# Patient Record
Sex: MALE | Race: WHITE | NOT HISPANIC OR LATINO | Employment: OTHER | URBAN - NONMETROPOLITAN AREA
[De-identification: names, ages, dates, MRNs, and addresses within clinical notes are randomized per-mention and may not be internally consistent; named-entity substitution may affect disease eponyms.]

---

## 2017-01-23 ENCOUNTER — OFFICE VISIT (OUTPATIENT)
Dept: CARDIOLOGY | Facility: PHYSICIAN GROUP | Age: 82
End: 2017-01-23
Payer: MEDICARE

## 2017-01-23 VITALS
DIASTOLIC BLOOD PRESSURE: 70 MMHG | HEART RATE: 90 BPM | HEIGHT: 68 IN | BODY MASS INDEX: 28.19 KG/M2 | SYSTOLIC BLOOD PRESSURE: 114 MMHG | WEIGHT: 186 LBS

## 2017-01-23 DIAGNOSIS — I25.5 ISCHEMIC CARDIOMYOPATHY: ICD-10-CM

## 2017-01-23 DIAGNOSIS — I25.2 H/O NON-ST ELEVATION MYOCARDIAL INFARCTION (NSTEMI): ICD-10-CM

## 2017-01-23 DIAGNOSIS — I10 ESSENTIAL HYPERTENSION, BENIGN: ICD-10-CM

## 2017-01-23 DIAGNOSIS — I25.10 CORONARY ARTERY DISEASE INVOLVING NATIVE CORONARY ARTERY OF NATIVE HEART WITHOUT ANGINA PECTORIS: ICD-10-CM

## 2017-01-23 DIAGNOSIS — I51.3 APICAL MURAL THROMBUS: ICD-10-CM

## 2017-01-23 PROCEDURE — 99213 OFFICE O/P EST LOW 20 MIN: CPT | Performed by: INTERNAL MEDICINE

## 2017-01-23 ASSESSMENT — ENCOUNTER SYMPTOMS
BRUISES/BLEEDS EASILY: 0
FALLS: 0
COUGH: 0
SEIZURES: 0
ORTHOPNEA: 0
PND: 0
MYALGIAS: 0

## 2017-01-23 NOTE — PROGRESS NOTES
Subjective:   Juliocesar Salas is a 82 y.o. male who presents today for followup of coronary disease.  He has been clinically stable from a cardiac perspective but his echo was delayed until next week. We're going to get it as soon as possible. He has not had syncope nor near syncope nor chest pain. He has not had dyspnea. His principal symptom disease of fatigue. The other issue he has has occasional positional dizziness particularly turns his head to the left and his wife notices that at times he doesn't seem to be attending to his surroundings although he has had no loss of consciousness and he is unaware of this.    Past Medical History   Diagnosis Date   • Hypertension    • Arthritis    • Diabetes 1988     oral agents   • Asthma    • Coronary artery disease involving native coronary artery without angina pectoris 11/25/2014     Severe stenosis of LAD and ramus intermedius with moderate proximal RCA and CFX disease and severe distal disease, PCI not successful and CABG declined by Dr. Mustafa and family.   • Non-STEMI (non-ST elevated myocardial infarction) (CMS-HCC)    • Apical mural thrombus following MI (CMS-HCC)    • Rectus sheath hematoma 12/3/2014     Past Surgical History   Procedure Laterality Date   • Knee arthroplasty total  2002     bilateral   • Hip arthroplasty total  2007     right   • Colonoscopy  2/2012   • Inguinal hernia repair  1986     right x2   • Hip arthroplasty total  3/5/2012     Performed by OSMANY [facility] at SURGERY [facility]   • Cataract extraction with iol       Family History   Problem Relation Age of Onset   • Diabetes     • Heart Disease     • Hypertension     • Heart Attack Father 65     MI     History   Smoking status   • Former Smoker -- 1 years   • Types: Cigars   • Start date: 01/01/1993   • Quit date: 01/01/1994   Smokeless tobacco   • Former User   • Types: Chew   • Quit date: 01/01/1994     Allergies   Allergen Reactions   • Ambien [Zolpidem]      delerium   • Nkda [No  "Known Drug Allergy]      Outpatient Encounter Prescriptions as of 1/23/2017   Medication Sig Dispense Refill   • zolpidem (AMBIEN) 10 MG Tab Take 10 mg by mouth at bedtime as needed for Sleep.     • carvedilol (COREG) 3.125 MG Tab Take 1 Tab by mouth 2 times a day, with meals. 180 Tab 3   • montelukast (SINGULAIR) 10 MG Tab Take 10 mg by mouth every evening.     • tamsulosin (FLOMAX) 0.4 MG capsule Take 0.4 mg by mouth ONE-HALF HOUR AFTER BREAKFAST.     • furosemide (LASIX) 40 MG Tab Take 40 mg by mouth. 1 TAB IN AM; 1/2 TAB IN AFTERNOON     • lisinopril (PRINIVIL) 5 MG Tab Take 5 mg by mouth every day.     • aspirin EC (ECOTRIN) 81 MG Tablet Delayed Response Take 81 mg by mouth every day.     • atorvastatin (LIPITOR) 20 MG Tab Take 1 Tab by mouth every day. 30 Tab 11   • nitroglycerin (NITROSTAT) 0.4 MG SL Tab Place 1 Tab under tongue as needed. PLACE 1 TABLET UNDER TONGUE AS NEEDED FOR CHEST PAIN 25 Tab 5   • budesonide-formoterol (SYMBICORT) 160-4.5 MCG/ACT AERO Inhale 2 Puffs by mouth 2 Times a Day.     • Saw Palmetto, Serenoa repens, (SAW PALMETTO PO) Take  by mouth.     • Multiple Vitamin (MULTI-VITAMIN DAILY PO) Take  by mouth.     • vitamin D (CHOLECALCIFEROL) 1000 UNIT TABS Take 2,000 Units by mouth every day.     • potassium citrate SR (UROCIT-K SR) 10 MEQ (1080 MG) TBCR Take 10 mEq by mouth 2 Times a Day.     • pioglitazone (ACTOS) 45 MG TABS Take 45 mg by mouth every day.       No facility-administered encounter medications on file as of 1/23/2017.     Review of Systems   HENT: Negative for nosebleeds.    Respiratory: Negative for cough.    Cardiovascular: Negative for orthopnea and PND.   Musculoskeletal: Negative for myalgias and falls.   Neurological: Negative for seizures.   Endo/Heme/Allergies: Does not bruise/bleed easily.        Objective:   /70 mmHg  Pulse 90  Ht 1.727 m (5' 8\")  Wt 84.369 kg (186 lb)  BMI 28.29 kg/m2    Physical Exam   Constitutional: He is oriented to person, place, " and time. He appears well-developed and well-nourished.   Eyes: Conjunctivae are normal. No scleral icterus.   Neck: No JVD present.   Cardiovascular: Normal rate, regular rhythm and intact distal pulses.  Exam reveals no gallop.    No murmur heard.  Pulmonary/Chest: Effort normal and breath sounds normal.   Musculoskeletal: Normal range of motion. He exhibits no edema.   Neurological: He is alert and oriented to person, place, and time.   Skin: Skin is warm and dry.   Psychiatric: He has a normal mood and affect. Thought content normal.       Assessment:     1. Coronary artery disease involving native coronary artery of native heart without angina pectoris     2. Ischemic cardiomyopathy     3. H/O non-ST elevation myocardial infarction (NSTEMI)     4. Essential hypertension, benign     5. Apical mural thrombus (CMS-HCC)     The above assessed cardiovascular problems are clinically stable but we do need the echo.  Also the positional vertigo or dizziness and periods of apparent absence attacks will be discussed with Dr. Malik for consideration for neurological assessment.    Medical Decision Making:  Today's Assessment / Status / Plan:     Continue the current cardiovascular regimen.  Continue primary follow up with  Dr. Malik.   Cardiology follow up in  2 months after the echo and sooner if needed for any change.   Lab were reviewed and he has seen Dr. Goode.  Use of the emergency medical system reviewed.

## 2017-01-23 NOTE — Clinical Note
[facility] [address]  Phone: [phone]  Fax: [phone]              Juliocesar Salas  10/25/1934    Encounter Date: 1/23/2017    Osmany Hernandez M.D.          PROGRESS NOTE:  Subjective:   Juliocesar Salas is a 82 y.o. male who presents today for followup of coronary disease.  He has been clinically stable from a cardiac perspective but his echo was delayed until next week. We're going to get it as soon as possible. He has not had syncope nor near syncope nor chest pain. He has not had dyspnea. His principal symptom disease of fatigue. The other issue he has has occasional positional dizziness particularly turns his head to the left and his wife notices that at times he doesn't seem to be attending to his surroundings although he has had no loss of consciousness and he is unaware of this.    Past Medical History   Diagnosis Date   • Hypertension    • Arthritis    • Diabetes 1988     oral agents   • Asthma    • Coronary artery disease involving native coronary artery without angina pectoris 11/25/2014     Severe stenosis of LAD and ramus intermedius with moderate proximal RCA and CFX disease and severe distal disease, PCI not successful and CABG declined by Dr. Mustafa and family.   • Non-STEMI (non-ST elevated myocardial infarction) (CMS-HCC)    • Apical mural thrombus following MI (CMS-HCC)    • Rectus sheath hematoma 12/3/2014     Past Surgical History   Procedure Laterality Date   • Knee arthroplasty total  2002     bilateral   • Hip arthroplasty total  2007     right   • Colonoscopy  2/2012   • Inguinal hernia repair  1986     right x2   • Hip arthroplasty total  3/5/2012     Performed by OSMANY ROR at SURGERY [facility] ORS   • Cataract extraction with iol       Family History   Problem Relation Age of Onset   • Diabetes     • Heart Disease     • Hypertension     • Heart Attack Father 65     MI     History   Smoking  status   • Former Smoker -- 1 years   • Types: Cigars   • Start date: 01/01/1993   • Quit date: 01/01/1994   Smokeless tobacco   • Former User   • Types: Chew   • Quit date: 01/01/1994     Allergies   Allergen Reactions   • Ambien [Zolpidem]      delerium   • Nkda [No Known Drug Allergy]      Outpatient Encounter Prescriptions as of 1/23/2017   Medication Sig Dispense Refill   • zolpidem (AMBIEN) 10 MG Tab Take 10 mg by mouth at bedtime as needed for Sleep.     • carvedilol (COREG) 3.125 MG Tab Take 1 Tab by mouth 2 times a day, with meals. 180 Tab 3   • montelukast (SINGULAIR) 10 MG Tab Take 10 mg by mouth every evening.     • tamsulosin (FLOMAX) 0.4 MG capsule Take 0.4 mg by mouth ONE-HALF HOUR AFTER BREAKFAST.     • furosemide (LASIX) 40 MG Tab Take 40 mg by mouth. 1 TAB IN AM; 1/2 TAB IN AFTERNOON     • lisinopril (PRINIVIL) 5 MG Tab Take 5 mg by mouth every day.     • aspirin EC (ECOTRIN) 81 MG Tablet Delayed Response Take 81 mg by mouth every day.     • atorvastatin (LIPITOR) 20 MG Tab Take 1 Tab by mouth every day. 30 Tab 11   • nitroglycerin (NITROSTAT) 0.4 MG SL Tab Place 1 Tab under tongue as needed. PLACE 1 TABLET UNDER TONGUE AS NEEDED FOR CHEST PAIN 25 Tab 5   • budesonide-formoterol (SYMBICORT) 160-4.5 MCG/ACT AERO Inhale 2 Puffs by mouth 2 Times a Day.     • Saw Palmetto, Serenoa repens, (SAW PALMETTO PO) Take  by mouth.     • Multiple Vitamin (MULTI-VITAMIN DAILY PO) Take  by mouth.     • vitamin D (CHOLECALCIFEROL) 1000 UNIT TABS Take 2,000 Units by mouth every day.     • potassium citrate SR (UROCIT-K SR) 10 MEQ (1080 MG) TBCR Take 10 mEq by mouth 2 Times a Day.     • pioglitazone (ACTOS) 45 MG TABS Take 45 mg by mouth every day.       No facility-administered encounter medications on file as of 1/23/2017.     Review of Systems   HENT: Negative for nosebleeds.    Respiratory: Negative for cough.    Cardiovascular: Negative for orthopnea and PND.   Musculoskeletal: Negative for myalgias and falls.    "  Neurological: Negative for seizures.   Endo/Heme/Allergies: Does not bruise/bleed easily.        Objective:   /70 mmHg  Pulse 90  Ht 1.727 m (5' 8\")  Wt 84.369 kg (186 lb)  BMI 28.29 kg/m2    Physical Exam   Constitutional: He is oriented to person, place, and time. He appears well-developed and well-nourished.   Eyes: Conjunctivae are normal. No scleral icterus.   Neck: No JVD present.   Cardiovascular: Normal rate, regular rhythm and intact distal pulses.  Exam reveals no gallop.    No murmur heard.  Pulmonary/Chest: Effort normal and breath sounds normal.   Musculoskeletal: Normal range of motion. He exhibits no edema.   Neurological: He is alert and oriented to person, place, and time.   Skin: Skin is warm and dry.   Psychiatric: He has a normal mood and affect. Thought content normal.       Assessment:     1. Coronary artery disease involving native coronary artery of native heart without angina pectoris     2. Ischemic cardiomyopathy     3. H/O non-ST elevation myocardial infarction (NSTEMI)     4. Essential hypertension, benign     5. Apical mural thrombus (CMS-HCC)     The above assessed cardiovascular problems are clinically stable but we do need the echo.  Also the positional vertigo or dizziness and periods of apparent absence attacks will be discussed with Dr. Malik for consideration for neurological assessment.    Medical Decision Making:  Today's Assessment / Status / Plan:     Continue the current cardiovascular regimen.  Continue primary follow up with  Dr. Malik.   Cardiology follow up in  2 months after the echo and sooner if needed for any change.   Lab were reviewed and he has seen Dr. Goode.  Use of the emergency medical system reviewed.         No Recipients                "

## 2017-02-10 DIAGNOSIS — I25.5 ISCHEMIC CARDIOMYOPATHY: ICD-10-CM

## 2017-02-10 DIAGNOSIS — I51.3 APICAL MURAL THROMBUS: ICD-10-CM

## 2017-02-16 ENCOUNTER — OFFICE VISIT (OUTPATIENT)
Dept: CARDIOLOGY | Facility: PHYSICIAN GROUP | Age: 82
End: 2017-02-16
Payer: MEDICARE

## 2017-02-16 VITALS
HEIGHT: 67 IN | DIASTOLIC BLOOD PRESSURE: 70 MMHG | OXYGEN SATURATION: 89 % | SYSTOLIC BLOOD PRESSURE: 120 MMHG | BODY MASS INDEX: 30.92 KG/M2 | WEIGHT: 197 LBS | HEART RATE: 87 BPM

## 2017-02-16 DIAGNOSIS — I50.42 CHRONIC COMBINED SYSTOLIC AND DIASTOLIC CHF, NYHA CLASS 4 (HCC): ICD-10-CM

## 2017-02-16 DIAGNOSIS — N18.30 CKD (CHRONIC KIDNEY DISEASE) STAGE 3, GFR 30-59 ML/MIN (HCC): ICD-10-CM

## 2017-02-16 DIAGNOSIS — I51.3 APICAL MURAL THROMBUS: ICD-10-CM

## 2017-02-16 DIAGNOSIS — I25.5 ISCHEMIC CARDIOMYOPATHY: ICD-10-CM

## 2017-02-16 DIAGNOSIS — I45.2 RBBB (RIGHT BUNDLE BRANCH BLOCK WITH LEFT POSTERIOR FASCICULAR BLOCK): ICD-10-CM

## 2017-02-16 DIAGNOSIS — I25.10 CORONARY ARTERY DISEASE INVOLVING NATIVE CORONARY ARTERY OF NATIVE HEART WITHOUT ANGINA PECTORIS: ICD-10-CM

## 2017-02-16 PROCEDURE — 1101F PT FALLS ASSESS-DOCD LE1/YR: CPT | Mod: 8P | Performed by: INTERNAL MEDICINE

## 2017-02-16 PROCEDURE — G8419 CALC BMI OUT NRM PARAM NOF/U: HCPCS | Performed by: INTERNAL MEDICINE

## 2017-02-16 PROCEDURE — 99213 OFFICE O/P EST LOW 20 MIN: CPT | Performed by: INTERNAL MEDICINE

## 2017-02-16 PROCEDURE — 4040F PNEUMOC VAC/ADMIN/RCVD: CPT | Performed by: INTERNAL MEDICINE

## 2017-02-16 PROCEDURE — 1036F TOBACCO NON-USER: CPT | Performed by: INTERNAL MEDICINE

## 2017-02-16 PROCEDURE — G8598 ASA/ANTIPLAT THER USED: HCPCS | Performed by: INTERNAL MEDICINE

## 2017-02-16 PROCEDURE — G8432 DEP SCR NOT DOC, RNG: HCPCS | Performed by: INTERNAL MEDICINE

## 2017-02-16 PROCEDURE — G8484 FLU IMMUNIZE NO ADMIN: HCPCS | Performed by: INTERNAL MEDICINE

## 2017-02-16 RX ORDER — GLIPIZIDE 2.5 MG/1
5 TABLET, EXTENDED RELEASE ORAL DAILY
Status: ON HOLD | COMMUNITY
Start: 2017-02-15 | End: 2019-03-04

## 2017-02-16 RX ORDER — LISINOPRIL 2.5 MG/1
2.5 TABLET ORAL DAILY
Status: ON HOLD | COMMUNITY
Start: 2017-02-15 | End: 2019-03-04

## 2017-02-16 ASSESSMENT — ENCOUNTER SYMPTOMS
HEMOPTYSIS: 0
WHEEZING: 0
PND: 1
FALLS: 0
MYALGIAS: 0
ORTHOPNEA: 1
BRUISES/BLEEDS EASILY: 0
COUGH: 0
BLOOD IN STOOL: 0

## 2017-02-16 ASSESSMENT — LIFESTYLE VARIABLES: SUBSTANCE_ABUSE: 0

## 2017-02-16 NOTE — Clinical Note
[facility] [address]  Phone: [phone]  Fax: [phone]              Juliocesar Salas  10/25/1934    Encounter Date: 2/16/2017    Osmany Hernandez M.D.          PROGRESS NOTE:  Subjective:   Juliocesar Salas is a 82 y.o. male who presents today for follow-up of his coronary disease. He is still occasionally dyspneic at rest and has nocturnal dyspnea and orthopnea.  This may be starting to respond to the higher diuretic dose recommended by Dr. Dixon but renal function is limiting..  He is going to see Dr. Goode next week. He has not had angina.  See the enclosed note from Dr. Dixon.  Past Medical History   Diagnosis Date   • Hypertension    • Arthritis    • Diabetes 1988     oral agents   • Asthma    • Coronary artery disease involving native coronary artery without angina pectoris 11/25/2014     Severe stenosis of LAD and ramus intermedius with moderate proximal RCA and CFX disease and severe distal disease, PCI not successful and CABG declined by Dr. Mustafa and family.   • Non-STEMI (non-ST elevated myocardial infarction) (CMS-HCC)    • Apical mural thrombus following MI (CMS-HCC)    • Rectus sheath hematoma 12/3/2014     Past Surgical History   Procedure Laterality Date   • Knee arthroplasty total  2002     bilateral   • Hip arthroplasty total  2007     right   • Colonoscopy  2/2012   • Inguinal hernia repair  1986     right x2   • Hip arthroplasty total  3/5/2012     Performed by OSMANY ORR at SURGERY [facility] ORS   • Cataract extraction with iol       Family History   Problem Relation Age of Onset   • Diabetes     • Heart Disease     • Hypertension     • Heart Attack Father 65     MI     History   Smoking status   • Former Smoker -- 1 years   • Types: Cigars   • Start date: 01/01/1993   • Quit date: 01/01/1994   Smokeless tobacco   • Former User   • Types: Chew   • Quit date: 01/01/1994     Allergies   Allergen  Reactions   • Nkda [No Known Drug Allergy]      Outpatient Encounter Prescriptions as of 2/16/2017   Medication Sig Dispense Refill   • lisinopril (PRINIVIL) 2.5 MG Tab Take 2.5 mg by mouth every day.     • glipiZIDE SR (GLUCOTROL) 2.5 MG TABLET SR 24 HR Take 5 mg by mouth every day.     • zolpidem (AMBIEN) 10 MG Tab Take 10 mg by mouth at bedtime as needed for Sleep.     • tamsulosin (FLOMAX) 0.4 MG capsule Take 0.4 mg by mouth ONE-HALF HOUR AFTER BREAKFAST.     • furosemide (LASIX) 40 MG Tab Take 40 mg by mouth. 1 TAB IN AM; 1/2 TAB IN AFTERNOON     • aspirin EC (ECOTRIN) 81 MG Tablet Delayed Response Take 81 mg by mouth every day.     • budesonide-formoterol (SYMBICORT) 160-4.5 MCG/ACT AERO Inhale 2 Puffs by mouth 2 Times a Day.     • Multiple Vitamin (MULTI-VITAMIN DAILY PO) Take  by mouth.     • vitamin D (CHOLECALCIFEROL) 1000 UNIT TABS Take 2,000 Units by mouth every day.     • potassium citrate SR (UROCIT-K SR) 10 MEQ (1080 MG) TBCR Take 10 mEq by mouth 2 Times a Day.     • carvedilol (COREG) 3.125 MG Tab Take 1 Tab by mouth 2 times a day, with meals. 180 Tab 3   • montelukast (SINGULAIR) 10 MG Tab Take 10 mg by mouth every evening.     • lisinopril (PRINIVIL) 5 MG Tab Take 5 mg by mouth every day.     • atorvastatin (LIPITOR) 20 MG Tab Take 1 Tab by mouth every day. 30 Tab 11   • nitroglycerin (NITROSTAT) 0.4 MG SL Tab Place 1 Tab under tongue as needed. PLACE 1 TABLET UNDER TONGUE AS NEEDED FOR CHEST PAIN 25 Tab 5   • Saw Palmetto, Serenoa repens, (SAW PALMETTO PO) Take  by mouth.     • pioglitazone (ACTOS) 45 MG TABS Take 45 mg by mouth every day.       No facility-administered encounter medications on file as of 2/16/2017.     Review of Systems   HENT: Negative for nosebleeds.    Respiratory: Negative for cough, hemoptysis and wheezing.    Cardiovascular: Positive for orthopnea, leg swelling and PND.   Gastrointestinal: Negative for blood in stool and melena.   Genitourinary: Negative for hematuria.    "  Musculoskeletal: Negative for myalgias and falls.   Endo/Heme/Allergies: Does not bruise/bleed easily.   Psychiatric/Behavioral: Negative for substance abuse.        Objective:   /70 mmHg  Pulse 87  Ht 1.702 m (5' 7.01\")  Wt 89.359 kg (197 lb)  BMI 30.85 kg/m2  SpO2 89%    Physical Exam   Constitutional: He is oriented to person, place, and time. He appears well-developed and well-nourished.   Eyes: Conjunctivae are normal. No scleral icterus.   Neck: No JVD present.   Cardiovascular: Normal rate, regular rhythm and intact distal pulses.  Exam reveals no gallop.    No murmur heard.  Pulmonary/Chest: Effort normal and breath sounds normal.   Musculoskeletal: Normal range of motion. He exhibits no edema.   Neurological: He is alert and oriented to person, place, and time.   Skin: Skin is warm and dry.   Psychiatric: He has a normal mood and affect. Thought content normal.     Electrocardiogram shows a regular supraventricular rhythm with barely visible P waves but the echo does show clear TDI A waves and not low amplitude flutter, there is scarcely any inflow A wave compatible with grade 4 diastolic dysfunction. Ejection fraction is 30%. The apical thrombus was not visible.  Assessment:     1. Ischemic cardiomyopathy  [facility] EKG (Clinic Performed)   2. Apical mural thrombus (CMS-HCC)     3. Coronary artery disease involving native coronary artery of native heart without angina pectoris     4. CKD (chronic kidney disease) stage 3, GFR 30-59 ml/min     5. Chronic combined systolic and diastolic CHF, NYHA class 4 (CMS-HCC)       His congestive failure is becoming refractory although the apical thrombus could not be visualized on his most recent echo.  Renal function is limiting medical therapy. His coronary disease was not amenable to any mechanical solution. Postural lightheadedness is also limiting medical therapy although his blood pressure is adequate today.  Medical Decision Making:  Today's Assessment / " Status / Plan:     Continue to try the higher dose of furosemide at 40 mg twice daily with follow-up lab prior to his visit with Dr. Goode next week and continued primary follow-up with Dr. Dixon. I will refer him for a consultation with electrophysiology because under ordinary circumstances he would be a candidate for automatic implantable cardiac defibrillator but my question is whether biventricular pacing might ameliorate his symptoms although he does not have left bundle branch block.  He is well versed in the use of the emergency medical system. Follow-up with me after EP consultation.      No Recipients

## 2017-02-16 NOTE — PROGRESS NOTES
Subjective:   Juliocesar Salas is a 82 y.o. male who presents today for follow-up of his coronary disease. He is still occasionally dyspneic at rest and has nocturnal dyspnea and orthopnea.  This may be starting to respond to the higher diuretic dose recommended by Dr. Dixon but renal function is limiting..  He is going to see Dr. Goode next week. He has not had angina.  See the enclosed note from Dr. Dixon.  Past Medical History   Diagnosis Date   • Hypertension    • Arthritis    • Diabetes 1988     oral agents   • Asthma    • Coronary artery disease involving native coronary artery without angina pectoris 11/25/2014     Severe stenosis of LAD and ramus intermedius with moderate proximal RCA and CFX disease and severe distal disease, PCI not successful and CABG declined by Dr. Mustafa and family.   • Non-STEMI (non-ST elevated myocardial infarction) (CMS-HCC)    • Apical mural thrombus following MI (CMS-HCC)    • Rectus sheath hematoma 12/3/2014     Past Surgical History   Procedure Laterality Date   • Knee arthroplasty total  2002     bilateral   • Hip arthroplasty total  2007     right   • Colonoscopy  2/2012   • Inguinal hernia repair  1986     right x2   • Hip arthroplasty total  3/5/2012     Performed by OSMANY ORR at SURGERY [facility] ORS   • Cataract extraction with iol       Family History   Problem Relation Age of Onset   • Diabetes     • Heart Disease     • Hypertension     • Heart Attack Father 65     MI     History   Smoking status   • Former Smoker -- 1 years   • Types: Cigars   • Start date: 01/01/1993   • Quit date: 01/01/1994   Smokeless tobacco   • Former User   • Types: Chew   • Quit date: 01/01/1994     Allergies   Allergen Reactions   • Nkda [No Known Drug Allergy]      Outpatient Encounter Prescriptions as of 2/16/2017   Medication Sig Dispense Refill   • lisinopril (PRINIVIL) 2.5 MG Tab Take 2.5 mg by mouth every day.     • glipiZIDE SR (GLUCOTROL) 2.5 MG TABLET SR 24 HR Take 5 mg by mouth  "every day.     • zolpidem (AMBIEN) 10 MG Tab Take 10 mg by mouth at bedtime as needed for Sleep.     • tamsulosin (FLOMAX) 0.4 MG capsule Take 0.4 mg by mouth ONE-HALF HOUR AFTER BREAKFAST.     • furosemide (LASIX) 40 MG Tab Take 40 mg by mouth. 1 TAB IN AM; 1/2 TAB IN AFTERNOON     • aspirin EC (ECOTRIN) 81 MG Tablet Delayed Response Take 81 mg by mouth every day.     • budesonide-formoterol (SYMBICORT) 160-4.5 MCG/ACT AERO Inhale 2 Puffs by mouth 2 Times a Day.     • Multiple Vitamin (MULTI-VITAMIN DAILY PO) Take  by mouth.     • vitamin D (CHOLECALCIFEROL) 1000 UNIT TABS Take 2,000 Units by mouth every day.     • potassium citrate SR (UROCIT-K SR) 10 MEQ (1080 MG) TBCR Take 10 mEq by mouth 2 Times a Day.     • carvedilol (COREG) 3.125 MG Tab Take 1 Tab by mouth 2 times a day, with meals. 180 Tab 3   • montelukast (SINGULAIR) 10 MG Tab Take 10 mg by mouth every evening.     • lisinopril (PRINIVIL) 5 MG Tab Take 5 mg by mouth every day.     • atorvastatin (LIPITOR) 20 MG Tab Take 1 Tab by mouth every day. 30 Tab 11   • nitroglycerin (NITROSTAT) 0.4 MG SL Tab Place 1 Tab under tongue as needed. PLACE 1 TABLET UNDER TONGUE AS NEEDED FOR CHEST PAIN 25 Tab 5   • Saw Palmetto, Serenoa repens, (SAW PALMETTO PO) Take  by mouth.     • pioglitazone (ACTOS) 45 MG TABS Take 45 mg by mouth every day.       No facility-administered encounter medications on file as of 2/16/2017.     Review of Systems   HENT: Negative for nosebleeds.    Respiratory: Negative for cough, hemoptysis and wheezing.    Cardiovascular: Positive for orthopnea, leg swelling and PND.   Gastrointestinal: Negative for blood in stool and melena.   Genitourinary: Negative for hematuria.   Musculoskeletal: Negative for myalgias and falls.   Endo/Heme/Allergies: Does not bruise/bleed easily.   Psychiatric/Behavioral: Negative for substance abuse.        Objective:   /70 mmHg  Pulse 87  Ht 1.702 m (5' 7.01\")  Wt 89.359 kg (197 lb)  BMI 30.85 kg/m2  " SpO2 89%    Physical Exam   Constitutional: He is oriented to person, place, and time. He appears well-developed and well-nourished.   Eyes: Conjunctivae are normal. No scleral icterus.   Neck: No JVD present.   Cardiovascular: Normal rate, regular rhythm and intact distal pulses.  Exam reveals no gallop.    No murmur heard.  Pulmonary/Chest: Effort normal and breath sounds normal.   Musculoskeletal: Normal range of motion. He exhibits no edema.   Neurological: He is alert and oriented to person, place, and time.   Skin: Skin is warm and dry.   Psychiatric: He has a normal mood and affect. Thought content normal.     Electrocardiogram shows a regular supraventricular rhythm with barely visible P waves but the echo does show clear TDI A waves and not low amplitude flutter, there is scarcely any inflow A wave compatible with grade 4 diastolic dysfunction. Ejection fraction is 30%. The apical thrombus was not visible.  Assessment:     1. Ischemic cardiomyopathy  [facility] EKG (Clinic Performed)   2. Apical mural thrombus (CMS-[facility])     3. Coronary artery disease involving native coronary artery of native heart without angina pectoris     4. CKD (chronic kidney disease) stage 3, GFR 30-59 ml/min     5. Chronic combined systolic and diastolic CHF, NYHA class 4 (CMS-HCC)       His congestive failure is becoming refractory although the apical thrombus could not be visualized on his most recent echo.  Renal function is limiting medical therapy. His coronary disease was not amenable to any mechanical solution. Postural lightheadedness is also limiting medical therapy although his blood pressure is adequate today.  Medical Decision Making:  Today's Assessment / Status / Plan:     Continue to try the higher dose of furosemide at 40 mg twice daily with follow-up lab prior to his visit with Dr. Goode next week and continued primary follow-up with Dr. Dixon. I will refer him for a consultation with electrophysiology because under  ordinary circumstances he would be a candidate for automatic implantable cardiac defibrillator but my question is whether biventricular pacing might ameliorate his symptoms although he does not have left bundle branch block.  He is well versed in the use of the emergency medical system. Follow-up with me after EP consultation.

## 2017-02-28 ENCOUNTER — OFFICE VISIT (OUTPATIENT)
Dept: CARDIOLOGY | Facility: MEDICAL CENTER | Age: 82
End: 2017-02-28
Payer: MEDICARE

## 2017-02-28 ENCOUNTER — TELEPHONE (OUTPATIENT)
Dept: CARDIOLOGY | Facility: MEDICAL CENTER | Age: 82
End: 2017-02-28

## 2017-02-28 VITALS
OXYGEN SATURATION: 92 % | BODY MASS INDEX: 30.61 KG/M2 | HEIGHT: 67 IN | HEART RATE: 92 BPM | DIASTOLIC BLOOD PRESSURE: 76 MMHG | SYSTOLIC BLOOD PRESSURE: 110 MMHG | WEIGHT: 195 LBS

## 2017-02-28 DIAGNOSIS — I25.10 CORONARY ARTERY DISEASE INVOLVING NATIVE CORONARY ARTERY OF NATIVE HEART WITHOUT ANGINA PECTORIS: ICD-10-CM

## 2017-02-28 DIAGNOSIS — I25.5 CARDIOMYOPATHY, ISCHEMIC: Primary | ICD-10-CM

## 2017-02-28 DIAGNOSIS — I45.10 RIGHT BUNDLE BRANCH BLOCK (RBBB): ICD-10-CM

## 2017-02-28 DIAGNOSIS — I10 ESSENTIAL HYPERTENSION: ICD-10-CM

## 2017-02-28 PROCEDURE — G8432 DEP SCR NOT DOC, RNG: HCPCS | Performed by: INTERNAL MEDICINE

## 2017-02-28 PROCEDURE — 1101F PT FALLS ASSESS-DOCD LE1/YR: CPT | Mod: 8P | Performed by: INTERNAL MEDICINE

## 2017-02-28 PROCEDURE — 4040F PNEUMOC VAC/ADMIN/RCVD: CPT | Performed by: INTERNAL MEDICINE

## 2017-02-28 PROCEDURE — G8598 ASA/ANTIPLAT THER USED: HCPCS | Performed by: INTERNAL MEDICINE

## 2017-02-28 PROCEDURE — G8484 FLU IMMUNIZE NO ADMIN: HCPCS | Performed by: INTERNAL MEDICINE

## 2017-02-28 PROCEDURE — G8419 CALC BMI OUT NRM PARAM NOF/U: HCPCS | Performed by: INTERNAL MEDICINE

## 2017-02-28 PROCEDURE — 93000 ELECTROCARDIOGRAM COMPLETE: CPT | Performed by: INTERNAL MEDICINE

## 2017-02-28 PROCEDURE — 99205 OFFICE O/P NEW HI 60 MIN: CPT | Performed by: INTERNAL MEDICINE

## 2017-02-28 PROCEDURE — 1036F TOBACCO NON-USER: CPT | Performed by: INTERNAL MEDICINE

## 2017-02-28 NOTE — MR AVS SNAPSHOT
"        Juliocesar Salas   2017 1:20 PM   Office Visit   MRN: [id number]    Department:  [facility]   Dept Phone:  [phone]    Description:  Male : 10/25/1934   Provider:  Catina Jarrell M.D.           Reason for Visit     New Patient           Allergies as of 2017     Allergen Noted Reactions    Nkda [No Known Drug Allergy] 2012         You were diagnosed with     Cardiomyopathy, ischemic   [161632]  -  Primary     Essential hypertension   [0753146]       Coronary artery disease involving native coronary artery of native heart without angina pectoris   [8878790]       Right bundle branch block (RBBB)   [5555660]         Vital Signs     Blood Pressure Pulse Height Weight Body Mass Index Oxygen Saturation    110/76 mmHg 92 1.702 m (5' 7.01\") 88.451 kg (195 lb) 30.53 kg/m2 92%    Smoking Status                   Former Smoker           Basic Information     Date Of Birth Sex Race Ethnicity Preferred Language    10/25/1934 Male White Non- English      Your appointments     Mar 14, 2017 12:40 PM   FOLLOW UP with Daniel Hernandez M.D.   [facility] (--)    [address] [address]   [phone]            2017  1:40 PM   FOLLOW UP with Daniel Hernandez M.D.   [facility] (--)    [address] [address]   [phone]              Problem List              ICD-10-CM Priority Class Noted - Resolved    Diabetes type 2, controlled (CMS-HCC) E11.9   2014 - Present    COPD (chronic obstructive pulmonary disease) (CMS-[facility]) J44.9   2014 - Present    BPH (benign prostatic hyperplasia) N40.0   2014 - Present    Mixed hyperlipidemia E78.2   2014 - Present    Rectus sheath hematoma S30.1XXA   12/3/2014 - Present    Apical mural thrombus (CMS-HCC) I21.3   2014 - Present    RLS (restless legs syndrome) G25.81   12/3/2014 - Present    Ischemic " cardiomyopathy I25.5   2/20/2015 - Present    Coronary artery disease involving native coronary artery without angina pectoris I25.10   11/25/2014 - Present    H/O non-ST elevation myocardial infarction (NSTEMI) I25.2   11/25/2014 - Present    Essential hypertension, benign I10   6/8/2016 - Present    CKD (chronic kidney disease) stage 3, GFR 30-59 ml/min N18.3   6/8/2016 - Present    Asthma in adult without complication J45.909   6/9/2016 - Present    Chronic combined systolic and diastolic CHF, NYHA class 4 (CMS-[facility]) I50.42   2/16/2017 - Present      Health Maintenance        Date Due Completion Dates    DIABETES MONOFILAMENT / LE EXAM 1935 ---    RETINAL SCREENING 10/25/1952 ---    URINE ACR / MICROALBUMIN 10/25/1952 ---    IMM DTaP/Tdap/Td Vaccine (1 - Tdap) 10/25/1953 ---    COLONOSCOPY 10/25/1984 ---    IMM ZOSTER VACCINE 10/25/1994 ---    IMM PNEUMOCOCCAL 65+ (ADULT) LOW/MEDIUM RISK SERIES (2 of 2 - PCV13) 1/1/2007 1/1/2006    A1C SCREENING 5/25/2015 11/25/2014, 8/4/2014    FASTING LIPID PROFILE 11/25/2015 11/25/2014, 8/4/2014    IMM INFLUENZA (1) 9/1/2016 9/15/2014, 5/4/2014    SERUM CREATININE 12/9/2017 12/9/2016, 12/10/2014, 12/9/2014, 12/8/2014, 12/7/2014, 12/6/2014, 12/5/2014, 12/5/2014, 12/4/2014, 12/3/2014, 12/2/2014, 11/29/2014, 11/28/2014, 11/27/2014, 11/25/2014, 11/25/2014, 5/3/2014, 3/7/2012, 3/2/2012            Results       Current Immunizations     Influenza TIV (IM) 9/15/2014, 5/4/2014  4:00 AM    Pneumococcal polysaccharide vaccine (PPSV-23) 1/1/2006      Below and/or attached are the medications your provider expects you to take. Review all of your home medications and newly ordered medications with your provider and/or pharmacist. Follow medication instructions as directed by your provider and/or pharmacist. Please keep your medication list with you and share with your provider. Update the information when medications are discontinued, doses are changed, or new medications (including  over-the-counter products) are added; and carry medication information at all times in the event of emergency situations     Allergies:  NKDA - (reactions not documented)               Medications  Valid as of: February 28, 2017 -  1:56 PM    Generic Name Brand Name Tablet Size Instructions for use    Aspirin (Tablet Delayed Response) ECOTRIN 81 MG Take 81 mg by mouth every day.        Atorvastatin Calcium (Tab) LIPITOR 20 MG Take 1 Tab by mouth every day.        Budesonide-Formoterol Fumarate (Aerosol) SYMBICORT 160-4.5 MCG/ACT Inhale 2 Puffs by mouth 2 Times a Day.        Carvedilol (Tab) COREG 3.125 MG Take 1 Tab by mouth 2 times a day, with meals.        Cholecalciferol (Tab) cholecalciferol 1000 UNIT Take 2,000 Units by mouth every day.        Furosemide (Tab) LASIX 40 MG Take 40 mg by mouth. 1 TAB IN AM; 1/2 TAB IN AFTERNOON        GlipiZIDE (TABLET SR 24 HR) GLUCOTROL 2.5 MG Take 5 mg by mouth every day.        Lisinopril (Tab) PRINIVIL 5 MG Take 5 mg by mouth every day.        Lisinopril (Tab) PRINIVIL 2.5 MG Take 2.5 mg by mouth every day.        Montelukast Sodium (Tab) SINGULAIR 10 MG Take 10 mg by mouth every evening.        Multiple Vitamin   Take  by mouth.        Nitroglycerin (SL Tab) NITROSTAT 0.4 MG Place 1 Tab under tongue as needed. PLACE 1 TABLET UNDER TONGUE AS NEEDED FOR CHEST PAIN        Pioglitazone HCl (Tab) ACTOS 45 MG Take 45 mg by mouth every day.        Potassium Citrate (Tab CR) UROCIT-K SR 10 MEQ (1080 MG) Take 10 mEq by mouth 2 Times a Day.        Saw Palmetto (Serenoa repens)   Take  by mouth.        Tamsulosin HCl (Cap) FLOMAX 0.4 MG Take 0.4 mg by mouth ONE-HALF HOUR AFTER BREAKFAST.        Zolpidem Tartrate (Tab) AMBIEN 10 MG Take 10 mg by mouth at bedtime as needed for Sleep.        .                 Medicines prescribed today were sent to:     [id number] - [place], [place] - [address]    [address] [place] [address]    Phone:  [phone] Fax: [phone]    Open 24 Hours?: No      Medication refill instructions:       If your prescription bottle indicates you have medication refills left, it is not necessary to call your provider’s office. Please contact your pharmacy and they will refill your medication.    If your prescription bottle indicates you do not have any refills left, you may request refills at any time through one of the following ways: The online [employer] system (except Urgent Care), by calling your provider’s office, or by asking your pharmacy to contact your provider’s office with a refill request. Medication refills are processed only during regular business hours and may not be available until the next business day. Your provider may request additional information or to have a follow-up visit with you prior to refilling your medication.   *Please Note: Medication refills are assigned a new Rx number when refilled electronically. Your pharmacy may indicate that no refills were authorized even though a new prescription for the same medication is available at the pharmacy. Please request the medicine by name with the pharmacy before contacting your provider for a refill.           [employer] Access Code: [email or web address]  Expires: 3/30/2017  1:56 PM    [employer]  A secure, online tool to manage your health information     [employer]’s [employer]® is a secure, online tool that connects you to your personalized health information from the privacy of your home -- day or night - making it very easy for you to manage your healthcare. Once the activation process is completed, you can even access your medical information using the [employer] yves, which is available for free in the Apple Yves store or Google Play store.     [employer] provides the following levels of access (as shown below):   My Chart Features   [facility] Primary Care Doctor [facility]  Specialists [facility]  Urgent  Care Non-[facility]  Primary Care  Doctor   Email your healthcare team  securely and privately 24/7 X X X    Manage appointments: schedule your next appointment; view details of past/upcoming appointments X      Request prescription refills. X      View recent personal medical records, including lab and immunizations X X X X   View health record, including health history, allergies, medications X X X X   Read reports about your outpatient visits, procedures, consult and ER notes X X X X   See your discharge summary, which is a recap of your hospital and/or ER visit that includes your diagnosis, lab results, and care plan. X X       How to register for Blend:  1. Go to  [email or web address].  2. Click on the Sign Up Now box, which takes you to the New Member Sign Up page. You will need to provide the following information:  a. Enter your Blend Access Code exactly as it appears at the top of this page. (You will not need to use this code after you’ve completed the sign-up process. If you do not sign up before the expiration date, you must request a new code.)   b. Enter your date of birth.   c. Enter your home email address.   d. Click Submit, and follow the next screen’s instructions.  3. Create a Blend ID. This will be your Blend login ID and cannot be changed, so think of one that is secure and easy to remember.  4. Create a Blend password. You can change your password at any time.  5. Enter your Password Reset Question and Answer. This can be used at a later time if you forget your password.   6. Enter your e-mail address. This allows you to receive e-mail notifications when new information is available in Blend.  7. Click Sign Up. You can now view your health information.    For assistance activating your Blend account, call [phone]

## 2017-02-28 NOTE — PROGRESS NOTES
Arrhythmia Clinic Note (New patient)     DOS: 2/28/2017    Referring physician: Daniel Hernandez    Chief complaint/Reason for consult: Consideration of BiV pacing    HPI:  Patient is an 81 yo WM with history of CAD and CHF 2/2 ICM with non-revascularizeable disease (failed PCI and not a CABG candidate) with moderately to severely depressed LV function and NYHA class IV symptoms at baseline. His LV function has continued to be depressed despite OMT. He complains of shortness of breath and significant orthopnea at rest. Denies palpitations/syncope. Recently he has developed a RBBB that is significantly wide on surface EKG (~150 ms) and he was referred for consideration of BiV pacing.    ROS (+ highlighted in red):  Constitutional: Fevers/chills/fatigue/weightloss  HEENT: Blurry vision/eye pain/sore throat/hearing loss  Respiratory: Shortness of breath/cough  Cardiovascular: Chest pain/palpitations/edema/orthopnea/syncope  GI: Nausea/vomitting/diarrhea  MSK: Arthralgias/myagias/muscle weakness  Skin: Rash/sores  Neurological: Numbness/tremors/vertigo  Endocrine: Excessive thirst/polyuria/cold intolerance/heat intolerance  Psych: Depression/anxiety    Past Medical History   Diagnosis Date   • Hypertension    • Arthritis    • Diabetes 1988     oral agents   • Asthma    • Coronary artery disease involving native coronary artery without angina pectoris 11/25/2014     Severe stenosis of LAD and ramus intermedius with moderate proximal RCA and CFX disease and severe distal disease, PCI not successful and CABG declined by Dr. Mustafa and family.   • Non-STEMI (non-ST elevated myocardial infarction) (CMS-HCC)    • Apical mural thrombus following MI (CMS-[facility])    • Rectus sheath hematoma 12/3/2014       Past Surgical History   Procedure Laterality Date   • Knee arthroplasty total  2002     bilateral   • Hip arthroplasty total  2007     right   • Colonoscopy  2/2012   • Inguinal hernia repair  1986     right x2   • Hip  arthroplasty total  3/5/2012     Performed by OSMANY ORR at SURGERY [facility] ORS   • Cataract extraction with iol         Social History     Social History   • Marital Status:      Spouse Name: N/A   • Number of Children: N/A   • Years of Education: N/A     Occupational History   • Not on file.     Social History Main Topics   • Smoking status: Former Smoker -- 1 years     Types: Cigars     Start date: 01/01/1993     Quit date: 01/01/1994   • Smokeless tobacco: Former User     Types: Chew     Quit date: 01/01/1994   • Alcohol Use: Yes      Comment: 2 drinks every 2 weeks   • Drug Use: No   • Sexual Activity: Not on file     Other Topics Concern   • Not on file     Social History Narrative       Family History   Problem Relation Age of Onset   • Diabetes     • Heart Disease     • Hypertension     • Heart Attack Father 65     MI       Allergies   Allergen Reactions   • Nkda [No Known Drug Allergy]        Current Outpatient Prescriptions   Medication Sig Dispense Refill   • glipiZIDE SR (GLUCOTROL) 2.5 MG TABLET SR 24 HR Take 5 mg by mouth every day.     • zolpidem (AMBIEN) 10 MG Tab Take 10 mg by mouth at bedtime as needed for Sleep.     • carvedilol (COREG) 3.125 MG Tab Take 1 Tab by mouth 2 times a day, with meals. 180 Tab 3   • tamsulosin (FLOMAX) 0.4 MG capsule Take 0.4 mg by mouth ONE-HALF HOUR AFTER BREAKFAST.     • furosemide (LASIX) 40 MG Tab Take 40 mg by mouth. 1 TAB IN AM; 1/2 TAB IN AFTERNOON     • aspirin EC (ECOTRIN) 81 MG Tablet Delayed Response Take 81 mg by mouth every day.     • atorvastatin (LIPITOR) 20 MG Tab Take 1 Tab by mouth every day. 30 Tab 11   • nitroglycerin (NITROSTAT) 0.4 MG SL Tab Place 1 Tab under tongue as needed. PLACE 1 TABLET UNDER TONGUE AS NEEDED FOR CHEST PAIN 25 Tab 5   • budesonide-formoterol (SYMBICORT) 160-4.5 MCG/ACT AERO Inhale 2 Puffs by mouth 2 Times a Day.     • Multiple Vitamin (MULTI-VITAMIN DAILY PO) Take  by mouth.     • vitamin D (CHOLECALCIFEROL)  "1000 UNIT TABS Take 2,000 Units by mouth every day.     • potassium citrate SR (UROCIT-K SR) 10 MEQ (1080 MG) TBCR Take 10 mEq by mouth 2 Times a Day.     • lisinopril (PRINIVIL) 2.5 MG Tab Take 2.5 mg by mouth every day.     • montelukast (SINGULAIR) 10 MG Tab Take 10 mg by mouth every evening.     • lisinopril (PRINIVIL) 5 MG Tab Take 5 mg by mouth every day.     • Saw Palmetto, Serenoa repens, (SAW PALMETTO PO) Take  by mouth.     • pioglitazone (ACTOS) 45 MG TABS Take 45 mg by mouth every day.       No current facility-administered medications for this visit.       Physical Exam:  Filed Vitals:    02/28/17 1309   BP: 110/76   Pulse: 92   Height: 1.702 m (5' 7.01\")   Weight: 88.451 kg (195 lb)   SpO2: 92%     General appearance: NAD, conversant   Eyes: anicteric sclerae, moist conjunctivae; no lid-lag; PERRLA  HENT: Atraumatic; oropharynx clear with moist mucous membranes and no mucosal ulcerations; normal hard and soft palate  Neck: Trachea midline; FROM, supple, no thyromegaly or lymphadenopathy  Lungs: CTA, with normal respiratory effort and no intercostal retractions  CV: RRR, no MRGs, no JVD   Abdomen: Soft, non-tender; no masses or HSM  Extremities: +Trace peripheral edema. No extremity lymphadenopathy  Skin: Normal temperature, turgor and texture; no rash, ulcers or subcutaneous nodules  Psych: Appropriate affect, alert and oriented to person, place and time    Data:  Labs reviewed:  Cr about 1.5    Prior echo/stress results reviewed:  LVEF 30%    EKG interpreted by me:  Poor baseline tracing, I believe he is in sinus, RBBB, QRS duration 150 ms    Impression/Plan:  1. Cardiomyopathy, ischemic  EKG   2. Essential hypertension  EKG   3. Coronary artery disease involving native coronary artery of native heart without angina pectoris     4. Right bundle branch block (RBBB)       -Even though he does not have a LBBB, he has a fairly wide right bundle at 150 ms and does meet class II indications for CRT  -He has " class IV HF symptoms and is not a candidate for ICD  -Risks and benefits including pneumothorax, bleeding/infection, perforation/tamponade/death, all discussed with him and all questions answered, he is willing to proceed  -Will schedule CRT-P (given his ICM will do with multipoint pacing capable device to give him better chance of CRT response)    Catina Jarrell MD

## 2017-03-02 ENCOUNTER — HOSPITAL ENCOUNTER (OUTPATIENT)
Facility: MEDICAL CENTER | Age: 82
End: 2017-03-03
Attending: INTERNAL MEDICINE | Admitting: INTERNAL MEDICINE
Payer: MEDICARE

## 2017-03-02 ENCOUNTER — APPOINTMENT (OUTPATIENT)
Dept: RADIOLOGY | Facility: MEDICAL CENTER | Age: 82
End: 2017-03-02
Attending: INTERNAL MEDICINE
Payer: MEDICARE

## 2017-03-02 PROBLEM — I45.10 RIGHT BUNDLE BRANCH BLOCK: Status: ACTIVE | Noted: 2017-03-02

## 2017-03-02 PROBLEM — I42.9 CARDIOMYOPATHY (HCC): Status: ACTIVE | Noted: 2017-03-02

## 2017-03-02 LAB
ALBUMIN SERPL BCP-MCNC: 3.6 G/DL (ref 3.2–4.9)
ALBUMIN/GLOB SERPL: 1.2 G/DL
ALP SERPL-CCNC: 68 U/L (ref 30–99)
ALT SERPL-CCNC: 21 U/L (ref 2–50)
ANION GAP SERPL CALC-SCNC: 8 MMOL/L (ref 0–11.9)
AST SERPL-CCNC: 23 U/L (ref 12–45)
BILIRUB SERPL-MCNC: 0.7 MG/DL (ref 0.1–1.5)
BUN SERPL-MCNC: 21 MG/DL (ref 8–22)
CALCIUM SERPL-MCNC: 9.5 MG/DL (ref 8.5–10.5)
CHLORIDE SERPL-SCNC: 103 MMOL/L (ref 96–112)
CO2 SERPL-SCNC: 30 MMOL/L (ref 20–33)
CREAT SERPL-MCNC: 1.52 MG/DL (ref 0.5–1.4)
EKG IMPRESSION: NORMAL
ERYTHROCYTE [DISTWIDTH] IN BLOOD BY AUTOMATED COUNT: 53.6 FL (ref 35.9–50)
GFR SERPL CREATININE-BSD FRML MDRD: 44 ML/MIN/1.73 M 2
GLOBULIN SER CALC-MCNC: 3 G/DL (ref 1.9–3.5)
GLUCOSE SERPL-MCNC: 133 MG/DL (ref 65–99)
HCT VFR BLD AUTO: 39.8 % (ref 42–52)
HGB BLD-MCNC: 12.6 G/DL (ref 14–18)
INR PPP: 1.08 (ref 0.87–1.13)
MCH RBC QN AUTO: 31.3 PG (ref 27–33)
MCHC RBC AUTO-ENTMCNC: 31.7 G/DL (ref 33.7–35.3)
MCV RBC AUTO: 98.8 FL (ref 81.4–97.8)
PLATELET # BLD AUTO: 192 K/UL (ref 164–446)
PMV BLD AUTO: 10.3 FL (ref 9–12.9)
POTASSIUM SERPL-SCNC: 4 MMOL/L (ref 3.6–5.5)
PROT SERPL-MCNC: 6.6 G/DL (ref 6–8.2)
PROTHROMBIN TIME: 14.3 SEC (ref 12–14.6)
RBC # BLD AUTO: 4.03 M/UL (ref 4.7–6.1)
SODIUM SERPL-SCNC: 141 MMOL/L (ref 135–145)
WBC # BLD AUTO: 6.2 K/UL (ref 4.8–10.8)

## 2017-03-02 PROCEDURE — 700101 HCHG RX REV CODE 250

## 2017-03-02 PROCEDURE — 305387 HCHG SUTURES

## 2017-03-02 PROCEDURE — 85610 PROTHROMBIN TIME: CPT

## 2017-03-02 PROCEDURE — 360979 HCHG DIAGNOSTIC CATH

## 2017-03-02 PROCEDURE — 80053 COMPREHEN METABOLIC PANEL: CPT

## 2017-03-02 PROCEDURE — 304952 HCHG R 2 PADS

## 2017-03-02 PROCEDURE — C1769 GUIDE WIRE: HCPCS

## 2017-03-02 PROCEDURE — 33208 INSRT HEART PM ATRIAL & VENT: CPT

## 2017-03-02 PROCEDURE — 99152 MOD SED SAME PHYS/QHP 5/>YRS: CPT

## 2017-03-02 PROCEDURE — 93010 ELECTROCARDIOGRAM REPORT: CPT | Performed by: INTERNAL MEDICINE

## 2017-03-02 PROCEDURE — C1887 CATHETER, GUIDING: HCPCS

## 2017-03-02 PROCEDURE — 93010 ELECTROCARDIOGRAM REPORT: CPT | Mod: 77 | Performed by: INTERNAL MEDICINE

## 2017-03-02 PROCEDURE — 99153 MOD SED SAME PHYS/QHP EA: CPT

## 2017-03-02 PROCEDURE — 85027 COMPLETE CBC AUTOMATED: CPT

## 2017-03-02 PROCEDURE — 304929 HCHG PTCA ACCESORY KIT

## 2017-03-02 PROCEDURE — G0378 HOSPITAL OBSERVATION PER HR: HCPCS

## 2017-03-02 PROCEDURE — C1894 INTRO/SHEATH, NON-LASER: HCPCS

## 2017-03-02 PROCEDURE — 700111 HCHG RX REV CODE 636 W/ 250 OVERRIDE (IP)

## 2017-03-02 PROCEDURE — C1785 PMKR, DUAL, RATE-RESP: HCPCS

## 2017-03-02 PROCEDURE — 93005 ELECTROCARDIOGRAM TRACING: CPT | Performed by: INTERNAL MEDICINE

## 2017-03-02 PROCEDURE — C2628 CATHETER, OCCLUSION: HCPCS

## 2017-03-02 PROCEDURE — A9270 NON-COVERED ITEM OR SERVICE: HCPCS | Performed by: INTERNAL MEDICINE

## 2017-03-02 PROCEDURE — 700102 HCHG RX REV CODE 250 W/ 637 OVERRIDE(OP): Performed by: INTERNAL MEDICINE

## 2017-03-02 PROCEDURE — 33225 L VENTRIC PACING LEAD ADD-ON: CPT | Mod: 74

## 2017-03-02 PROCEDURE — 36415 COLL VENOUS BLD VENIPUNCTURE: CPT

## 2017-03-02 PROCEDURE — C1892 INTRO/SHEATH,FIXED,PEEL-AWAY: HCPCS

## 2017-03-02 PROCEDURE — C1898 LEAD, PMKR, OTHER THAN TRANS: HCPCS

## 2017-03-02 PROCEDURE — 71010 DX-CHEST-PORTABLE (1 VIEW): CPT

## 2017-03-02 PROCEDURE — 304853 HCHG PPM TEST CABLE

## 2017-03-02 PROCEDURE — C1900 LEAD, CORONARY VENOUS: HCPCS

## 2017-03-02 RX ORDER — MIDAZOLAM HYDROCHLORIDE 1 MG/ML
INJECTION INTRAMUSCULAR; INTRAVENOUS
Status: COMPLETED
Start: 2017-03-02 | End: 2017-03-02

## 2017-03-02 RX ORDER — FUROSEMIDE 40 MG/1
40 TABLET ORAL
Status: DISCONTINUED | OUTPATIENT
Start: 2017-03-03 | End: 2017-03-03 | Stop reason: HOSPADM

## 2017-03-02 RX ORDER — PIOGLITAZONEHYDROCHLORIDE 45 MG/1
45 TABLET ORAL DAILY
Status: DISCONTINUED | OUTPATIENT
Start: 2017-03-03 | End: 2017-03-02

## 2017-03-02 RX ORDER — LISINOPRIL 5 MG/1
5 TABLET ORAL DAILY
Status: DISCONTINUED | OUTPATIENT
Start: 2017-03-03 | End: 2017-03-02

## 2017-03-02 RX ORDER — ATORVASTATIN CALCIUM 20 MG/1
20 TABLET, FILM COATED ORAL DAILY
Status: DISCONTINUED | OUTPATIENT
Start: 2017-03-03 | End: 2017-03-03 | Stop reason: HOSPADM

## 2017-03-02 RX ORDER — DIPHENHYDRAMINE HYDROCHLORIDE 50 MG/ML
INJECTION INTRAMUSCULAR; INTRAVENOUS
Status: COMPLETED
Start: 2017-03-02 | End: 2017-03-02

## 2017-03-02 RX ORDER — CEFAZOLIN SODIUM 1 G/3ML
INJECTION, POWDER, FOR SOLUTION INTRAMUSCULAR; INTRAVENOUS
Status: COMPLETED
Start: 2017-03-02 | End: 2017-03-02

## 2017-03-02 RX ORDER — CARVEDILOL 3.12 MG/1
3.12 TABLET ORAL 2 TIMES DAILY WITH MEALS
Status: DISCONTINUED | OUTPATIENT
Start: 2017-03-03 | End: 2017-03-03 | Stop reason: HOSPADM

## 2017-03-02 RX ORDER — MONTELUKAST SODIUM 10 MG/1
10 TABLET ORAL EVERY EVENING
Status: DISCONTINUED | OUTPATIENT
Start: 2017-03-02 | End: 2017-03-02

## 2017-03-02 RX ORDER — BUPIVACAINE HYDROCHLORIDE 2.5 MG/ML
INJECTION, SOLUTION EPIDURAL; INFILTRATION; INTRACAUDAL
Status: COMPLETED
Start: 2017-03-02 | End: 2017-03-02

## 2017-03-02 RX ORDER — NITROGLYCERIN 0.4 MG/1
0.4 TABLET SUBLINGUAL
Status: DISCONTINUED | OUTPATIENT
Start: 2017-03-02 | End: 2017-03-03 | Stop reason: HOSPADM

## 2017-03-02 RX ORDER — TAMSULOSIN HYDROCHLORIDE 0.4 MG/1
0.4 CAPSULE ORAL
Status: DISCONTINUED | OUTPATIENT
Start: 2017-03-03 | End: 2017-03-03 | Stop reason: HOSPADM

## 2017-03-02 RX ORDER — GLIPIZIDE 5 MG/1
5 TABLET, FILM COATED, EXTENDED RELEASE ORAL DAILY
Status: DISCONTINUED | OUTPATIENT
Start: 2017-03-03 | End: 2017-03-03 | Stop reason: HOSPADM

## 2017-03-02 RX ORDER — ZOLPIDEM TARTRATE 5 MG/1
10 TABLET ORAL NIGHTLY PRN
Status: DISCONTINUED | OUTPATIENT
Start: 2017-03-02 | End: 2017-03-03 | Stop reason: HOSPADM

## 2017-03-02 RX ORDER — LIDOCAINE HYDROCHLORIDE 20 MG/ML
INJECTION, SOLUTION INFILTRATION; PERINEURAL
Status: COMPLETED
Start: 2017-03-02 | End: 2017-03-02

## 2017-03-02 RX ADMIN — FENTANYL CITRATE 100 MCG: 50 INJECTION, SOLUTION INTRAMUSCULAR; INTRAVENOUS at 13:09

## 2017-03-02 RX ADMIN — CEFAZOLIN 1000 MG: 1 INJECTION, POWDER, FOR SOLUTION INTRAVENOUS at 12:25

## 2017-03-02 RX ADMIN — CEFAZOLIN 1000 MG: 1 INJECTION, POWDER, FOR SOLUTION INTRAVENOUS at 12:24

## 2017-03-02 RX ADMIN — DIPHENHYDRAMINE HYDROCHLORIDE 50 MG: 50 INJECTION INTRAMUSCULAR; INTRAVENOUS at 12:25

## 2017-03-02 RX ADMIN — MIDAZOLAM 2 MG: 1 INJECTION INTRAMUSCULAR; INTRAVENOUS at 12:29

## 2017-03-02 RX ADMIN — LIDOCAINE HYDROCHLORIDE: 20 INJECTION, SOLUTION INFILTRATION; PERINEURAL at 12:24

## 2017-03-02 RX ADMIN — BUPIVACAINE HYDROCHLORIDE: 2.5 INJECTION, SOLUTION EPIDURAL; INFILTRATION; INTRACAUDAL; PERINEURAL at 12:24

## 2017-03-02 RX ADMIN — ZOLPIDEM TARTRATE 10 MG: 5 TABLET, FILM COATED ORAL at 22:35

## 2017-03-02 RX ADMIN — MIDAZOLAM 2 MG: 1 INJECTION INTRAMUSCULAR; INTRAVENOUS at 14:31

## 2017-03-02 RX ADMIN — MIDAZOLAM 2 MG: 1 INJECTION INTRAMUSCULAR; INTRAVENOUS at 13:37

## 2017-03-02 RX ADMIN — FENTANYL CITRATE 100 MCG: 50 INJECTION, SOLUTION INTRAMUSCULAR; INTRAVENOUS at 13:37

## 2017-03-02 RX ADMIN — MIDAZOLAM 2 MG: 1 INJECTION INTRAMUSCULAR; INTRAVENOUS at 14:03

## 2017-03-02 RX ADMIN — FENTANYL CITRATE 100 MCG: 50 INJECTION, SOLUTION INTRAMUSCULAR; INTRAVENOUS at 12:40

## 2017-03-02 RX ADMIN — MIDAZOLAM 2 MG: 1 INJECTION INTRAMUSCULAR; INTRAVENOUS at 12:51

## 2017-03-02 ASSESSMENT — LIFESTYLE VARIABLES
ALCOHOL_USE: YES
TOTAL SCORE: 0
EVER_SMOKED: NEVER
HAVE YOU EVER FELT YOU SHOULD CUT DOWN ON YOUR DRINKING: NO
TOTAL SCORE: 0
EVER HAD A DRINK FIRST THING IN THE MORNING TO STEADY YOUR NERVES TO GET RID OF A HANGOVER: NO
EVER FELT BAD OR GUILTY ABOUT YOUR DRINKING: NO
ON A TYPICAL DAY WHEN YOU DRINK ALCOHOL HOW MANY DRINKS DO YOU HAVE: 1
HOW MANY TIMES IN THE PAST YEAR HAVE YOU HAD 5 OR MORE DRINKS IN A DAY: 0
TOTAL SCORE: 0
AVERAGE NUMBER OF DAYS PER WEEK YOU HAVE A DRINK CONTAINING ALCOHOL: 0
CONSUMPTION TOTAL: NEGATIVE
HAVE PEOPLE ANNOYED YOU BY CRITICIZING YOUR DRINKING: NO

## 2017-03-02 ASSESSMENT — PAIN SCALES - GENERAL
PAINLEVEL_OUTOF10: 0
PAINLEVEL_OUTOF10: 0
PAINLEVEL_OUTOF10: 1
PAINLEVEL_OUTOF10: 0

## 2017-03-02 NOTE — IP AVS SNAPSHOT
Jimubox Access Code: [email or web address]  Expires: 3/30/2017  1:56 PM    Jimubox  A secure, online tool to manage your health information     [employer]’s Jimubox® is a secure, online tool that connects you to your personalized health information from the privacy of your home -- day or night - making it very easy for you to manage your healthcare. Once the activation process is completed, you can even access your medical information using the Jim[employer]ox yves, which is available for free in the Apple Yves store or Google Play store.     Jimubox provides the following levels of access (as shown below):   My Chart Features   [facility] Primary Care Doctor [facility]  Specialists [facility]  Urgent  Care Non-[facility]  Primary Care  Doctor   Email your healthcare team securely and privately 24/7 X X X X   Manage appointments: schedule your next appointment; view details of past/upcoming appointments X      Request prescription refills. X      View recent personal medical records, including lab and immunizations X X X X   View health record, including health history, allergies, medications X X X X   Read reports about your outpatient visits, procedures, consult and ER notes X X X X   See your discharge summary, which is a recap of your hospital and/or ER visit that includes your diagnosis, lab results, and care plan. X X       How to register for Jimubox:  1. Go to  [email or web address].  2. Click on the Sign Up Now box, which takes you to the New Member Sign Up page. You will need to provide the following information:  a. Enter your Jimubox Access Code exactly as it appears at the top of this page. (You will not need to use this code after you’ve completed the sign-up process. If you do not sign up before the expiration date, you must request a new code.)   b. Enter your date of birth.   c. Enter your home email address.   d. Click Submit, and follow the next screen’s instructions.  3. Create a Jimubox ID. This will be your Jimubox  login ID and cannot be changed, so think of one that is secure and easy to remember.  4. Create a ShotClip password. You can change your password at any time.  5. Enter your Password Reset Question and Answer. This can be used at a later time if you forget your password.   6. Enter your e-mail address. This allows you to receive e-mail notifications when new information is available in ShotClip.  7. Click Sign Up. You can now view your health information.    For assistance activating your ShotClip account, call [phone]

## 2017-03-02 NOTE — OR NURSING
1456 Received to PPU #3, sleepy and then awakens but when awake- confuse and physically agitated and restless. Breathing noted to  be moderately labored with some abdominal retractions- O2 on at 6l/ oxymask - Skin color -pink and good O2 saturation. Left chest with dressing CDI, area soft. Left arm on a sling. Wife here and stayed with patient.  1530 Patient more alert and behavior is improved, less restless and less agitated. Respiration has improved also- less effort - O2 changed to nasal cannula and given patient juice to drink- which patient tolerated and enjoyed. No complaints. Also noted chest x ray result- no pneumothorax.  1600 Dozing in bed, no signs of discomfort. Awaiting for bed.  1645 Denied pain but remarked that he is starting to feel slight tenderness on the incision, applied ice.  1730 Sleeping. No signs of discomfort.  1800 Sleeping but aroused easily. Repositioned in bed and assisted with some snacks- tolerated.  1820 Assisted to urinate in urinal- voided with out complaint of difficulties.  1845 Lab here to draw blood for CMP.Still no bed assignment.   1855 Transferred to [facility] PACU in stable condition with O2 , monitor and all belongings. Family with patient. Bedside report given to Severo WORTHY.

## 2017-03-02 NOTE — OP REPORT
PROCEDURE PERFORMED: Permanent Pacemaker Implantation (attempted biventricular pacemaker)    DATE OF SERVICE: 3/2/2017    : Catina Jarrell MD    ASSISTANT: None    ANESTHESIA: Moderate sedation    EBL: 30 cc    SPECIMENS: None    Indication: Ambulatory class IV heart failure refractory to medical therapy, severely depressed LV function, wide QRS >=150 msec    DESCRIPTION OF PROCEDURE:  After informed written consent, the patient was brought to the electrophysiology lab in the fasting, unsedated state. The patient was prepped and draped in the usual sterile fashion. The procedure was performed under moderate sedation with local anesthetic. A left upper extremity venogram was performed, demonstrating a patent subclavian vein. A left infraclavicular incision was made with a scalpel and the pectoral device pocket was created using a combination of blunt dissection and electrocautery. The modified Seldinger technique was used to gain access to the left axillary vein. A peel-away hemostasis sheath was placed in the vein. Under fluoroscopic guidance, the pacemaker leads were introduced into the heart. The ventricular lead was advanced to the RVOT and then lowered into position at the RV septum.  A multipurpose 10 Fr SJM sheath along with a ALEXI III coronary catheter was used to access the coronary sinus. A balloon tipped catheter was advanced to the mid-CS and a CS venogram was performed demonstrating a lateral branch with a very tortuous proximal portion along with several other very small caliber branches and a tortuous MCV. A subacute sub-selector was used to intubate the lateral branch and a coronary wire (Whisper XS) was advanced down the branch but given the tortuosity, the lead was not able to be advanced down the branch. We tried using a stiffer coronary wire, including an Iron Man, but was also unsuccessful. The other two posterolateral branches were too small. We tried the MCV as well, but this was very  posterior and the proximal portion of this was too tortuous for the lead to get through despite being able to get a coronary wire down. The atrial lead was positioned on R atrial appendage. The atrial and ventricular leads were tested and had satisfactory sensing and pacing parameters. High output ventricular pacing did not produce extracardiac stimulation. The leads were sutured to the underlying pectoral muscle with interrupted silk over a silastic suture sleeve. The device pocket was irrigated with antibiotic solution, inspected, and no bleeding was seen. The leads were connected to the pacemaker pulse generator and the device was inserted into the pocket. The CS lead port was plugged. The wound was closed with three layers of absorbable sutures. Following recovery from sedation, the patient was transferred to a monitored bed in good condition.     IMPLANTED DEVICE INFORMATION:  Pulse generator is a SJM model JW3196  Serial # [id number]    LEAD INFORMATION:  1)Right atrial lead is a SJM model #2088TC/46 , serial #[id number], P wave 0.7 millivolts, threshold 1 Volts at 0.5 milliseconds, pacing impedance 460 Ohms.    2)Right ventricular lead is a SJM model #2088TC/52 , serial #[id number], R wave 8.2 millivolts, threshold 0.5 Volts at 0.5 milliseconds, pacing impedance 590 Ohms.    DEVICE PROGRAMMING:  DDD 60 -120 ppm    FLUOROSCOPY TIME: 36.2 minutes    IMPRESSIONS:  1. Successful dual chamber pacemaker implantation  2. Unsuccessful CS lead placement due to unfavorable anatomy    RECOMMENDATIONS:  1. Transfer to monitored bed  2. Chest x-ray  3. Device interrogation prior to hospital discharge  4. Followup in device clinic  5. Referral to cardiothoracic surgery for epicardial lead placement

## 2017-03-02 NOTE — IP AVS SNAPSHOT
" <p align=\"LEFT\"><IMG SRC=\"//[email or web address]$/[email or web address]\" alt=\"Image\" WIDTH=\"50%\" HEIGHT=\"200\" BORDER=\"\"></p>                   Name:Juliocesar Salas  Medical Record Number:[id number]  CSN: [id number]    YOB: 1934   Age: 82 y.o.  Sex: male  HT:1.727 m (5' 7.99\") WT: 88.2 kg (194 lb 7.1 oz)          Admit Date: 3/2/2017     Discharge Date:   Today's Date: 3/3/2017  Attending Doctor:  Catina Jarrell M.D.                  Allergies:  Nkda          Your appointments     Mar 14, 2017 12:40 PM   FOLLOW UP with Daniel Hernandez M.D.   [facility] (--)    [address] [address]   [phone]            Apr 03, 2017  1:40 PM   FOLLOW UP with Daniel Hernandez M.D.   [facility] (--)    [address] [address]   [phone]                 Medication List      Take these Medications        Instructions    ACTOS 45 MG Tabs   Generic drug:  pioglitazone    Take 45 mg by mouth every day.   Dose:  45 mg       aspirin EC 81 MG Tbec   Commonly known as:  ECOTRIN    Take 81 mg by mouth every day.   Dose:  81 mg       atorvastatin 20 MG Tabs   Commonly known as:  LIPITOR    Take 1 Tab by mouth every day.   Dose:  20 mg       budesonide-formoterol 160-4.5 MCG/ACT Aero   Commonly known as:  SYMBICORT    Inhale 2 Puffs by mouth 2 Times a Day.   Dose:  2 Puff       carvedilol 3.125 MG Tabs   Commonly known as:  COREG    Take 1 Tab by mouth 2 times a day, with meals.   Dose:  3.125 mg       furosemide 40 MG Tabs   Commonly known as:  LASIX    Take 40 mg by mouth. 1 TAB IN AM; 1/2 TAB IN AFTERNOON   Dose:  40 mg       glipiZIDE SR 2.5 MG Tb24   Commonly known as:  GLUCOTROL    Take 5 mg by mouth every day.   Dose:  5 mg       lisinopril 2.5 MG Tabs   What changed:  Another medication with the same name was removed. Continue taking this medication, and follow the directions you see here.   Commonly " known as:  PRINIVIL    Take 2.5 mg by mouth every day.   Dose:  2.5 mg       montelukast 10 MG Tabs   Commonly known as:  SINGULAIR    Take 10 mg by mouth every evening.   Dose:  10 mg       MULTI-VITAMIN DAILY PO    Take  by mouth.       nitroglycerin 0.4 MG Subl   Commonly known as:  NITROSTAT    Place 1 Tab under tongue as needed. PLACE 1 TABLET UNDER TONGUE AS NEEDED FOR CHEST PAIN   Dose:  0.4 mg       potassium citrate SR 10 MEQ (1080 MG) Tbcr   Commonly known as:  UROCIT-K SR    Take 10 mEq by mouth 2 Times a Day.   Dose:  10 mEq       SAW PALMETTO PO    Take  by mouth.       tamsulosin 0.4 MG capsule   Commonly known as:  FLOMAX    Take 0.4 mg by mouth ONE-HALF HOUR AFTER BREAKFAST.   Dose:  0.4 mg       vitamin D 1000 UNIT Tabs   Commonly known as:  cholecalciferol    Take 2,000 Units by mouth every day.   Dose:  2000 Units       zolpidem 10 MG Tabs   Commonly known as:  AMBIEN    Take 10 mg by mouth at bedtime as needed for Sleep.   Dose:  10 mg

## 2017-03-02 NOTE — IP AVS SNAPSHOT
" Home Care Instructions                                                                                                                  Name:Juliocesar Salas  Medical Record Number:[id number]  CSN: [id number]    YOB: 1934   Age: 82 y.o.  Sex: male  HT:1.727 m (5' 7.99\") WT: 88.2 kg (194 lb 7.1 oz)          Admit Date: 3/2/2017     Discharge Date:   Today's Date: 3/3/2017  Attending Doctor:  Catina Jarrell M.D.                  Allergies:  Nkda            Discharge Instructions       Discharge Instructions    Discharged to home by car with relative. Discharged via wheelchair, hospital escort: Yes.  Special equipment needed: Not Applicable    Be sure to schedule a follow-up appointment with your primary care doctor or any specialists as instructed.     Discharge Plan:   Diet Plan: Discussed  Activity Level: Discussed  Confirmed Follow up Appointment: Patient to Call and Schedule Appointment  Confirmed Symptoms Management: Discussed  Medication Reconciliation Updated: Yes  Influenza Vaccine Indication: Patient Refuses    I understand that a diet low in cholesterol, fat, and sodium is recommended for good health. Unless I have been given specific instructions below for another diet, I accept this instruction as my diet prescription.   Other diet: cardiac, low sodium    Special Instructions: None    · Is patient discharged on Warfarin / Coumadin?   No     · Is patient Post Blood Transfusion?  No    Depression / Suicide Risk    As you are discharged from this [facility] facility, it is important to learn how to keep safe from harming yourself.    Recognize the warning signs:  · Abrupt changes in personality, positive or negative- including increase in energy   · Giving away possessions  · Change in eating patterns- significant weight changes-  positive or negative  · Change in sleeping patterns- unable to sleep or sleeping all the time   · Unwillingness or inability to communicate  · Depression  · Unusual sadness, " discouragement and loneliness  · Talk of wanting to die  · Neglect of personal appearance   · Rebelliousness- reckless behavior  · Withdrawal from people/activities they love  · Confusion- inability to concentrate     If you or a loved one observes any of these behaviors or has concerns about self-harm, here's what you can do:  · Talk about it- your feelings and reasons for harming yourself  · Remove any means that you might use to hurt yourself (examples: pills, rope, extension cords, firearm)  · Get professional help from the community (Mental Health, Substance Abuse, psychological counseling)  · Do not be alone:Call your Safe Contact- someone whom you trust who will be there for you.  · Call your local CRISIS HOTLINE 059-9431 or 786-667-5017  · Call your local Children's Mobile Crisis Response Team [place] (822) 733-3778 or www.[employer]  · Call the toll free National Suicide Prevention Hotlines   · National Suicide Prevention Lifeline 123-102-DIQU (2073)  · [place] Hope Line Network 800-SUICIDE (102-9608)  Pacemaker Implantation  The heart has its own electrical system, or natural pacemaker, to regulate the heartbeat. Sometimes, the natural pacemaker system of the heart fails and causes the heart to beat too slowly. If this happens, a pacemaker can be surgically placed to help the heart beat at a normal or programmed rate. A pacemaker is a small, battery-powered device that is placed under the skin and is programmed to sense your heartbeats. If your heart rate is lower than the programmed rate, the pacemaker will pace your heart. Parts of a pacemaker include:  · Wires or leads. The leads are placed in the heart and transmit electricity to the heart. The leads are connected to the pulse generator.  · Pulse generator. The pulse generator contains a computer and a memory system. The pulse generator also produces the electrical signal that triggers the heart to beat.  A pacemaker may be placed  if:  · You have a slow heartbeat (bradycardia).  · You have fainting (syncope).  · Shortness of breath (dyspnea) due to heart problems.  LET YOUR HEALTH CARE PROVIDER KNOW ABOUT:  · Any allergies you may have.  · All medicines you are taking, including vitamins, herbs, eye drops, creams, and over-the-counter medicines.  · Previous problems you or members of your family have had with the use of anesthetics.  · Any blood disorders you have.  · Previous surgeries you have had.  · Medical conditions you have.  · Possibility of pregnancy, if this applies.  RISKS AND COMPLICATIONS  Generally, pacemaker implantation is a safe procedure. However, problems can occur and include:  · Bleeding.  · Unable to place the pacemaker under local sedation.  · Infection.  BEFORE THE PROCEDURE  · You will have blood work drawn before the procedure.  · Do not use any tobacco products including cigarettes, chewing tobacco, or electronic cigarettes. If you need help quitting, ask your health care provider.  · Do not eat or drink anything after midnight on the night before the procedure or as directed by your health care provider.  · Ask your health care provider about:  ¨ Changing or stopping your regular medicines. This is especially important if you are taking diabetes medicines or blood thinners.  ¨ Taking medicines such as aspirin and ibuprofen. These medicines can thin your blood. Do not take these medicines before your procedure if your health care provider asks you not to.  · Ask your health care provider if you can take a sip of water with any approved medicines the morning of the procedure.  PROCEDURE   The surgery to place a pacemaker is considered a minimally invasive surgical procedure. It is done under a local anesthetic, which is an injection at the incision site that makes the skin numb. You are also given sedation and pain medicine that makes you drowsy during the procedure.   · An intravenous line (IV) will be started in  your hand or arm so sedation and pain medicine can be given during the pacemaker procedure.  · A numbing medicine will be injected into the skin where the pacemaker is to be placed. A small incision will then be made into the skin. The pacemaker is usually placed under the skin near the collarbone.  · After the incision has been made, the leads will be inserted into a large vein and guided into the heart using X-ray.  · Using the same incision that was used to place the leads, a small pocket will be created under the skin to hold the pulse generator. The leads will then be connected to the pulse generator.  · The incision site will then be closed. A bandage (dressing) is placed over the pacemaker site. The dressing is removed 24-48 hours afterward.  AFTER THE PROCEDURE  · You will be taken to a recovery area after the pacemaker implant. Your vital signs such as blood pressure, heart rate, breathing, and oxygen levels will be monitored.  · A chest X-ray will be done after the pacemaker has been implanted. This is to make sure the pacemaker and leads are in the correct place.     This information is not intended to replace advice given to you by your health care provider. Make sure you discuss any questions you have with your health care provider.     Document Released: 12/08/2003 Document Revised: 01/08/2016 Document Reviewed: 04/23/2013  [employer] Patient Education ©2016 [employer]    No lifting over 5 # with the left arm. No above the head activities with the left arm. Immobilizer at night. Follow-up in [facility] in 1 week.   Report any swelling or redness at the site.    Your appointments     Mar 14, 2017 12:40 PM   FOLLOW UP with Daniel Hernandez M.D.   [facility] (--)    [address] [place] [address]   [phone]            Apr 03, 2017  1:40 PM   FOLLOW UP with Daniel Hernandez M.D.   [facility] (--)    [address] [address]   [phone]                 Discharge Medication Instructions:    Below are the medications your physician expects you to take upon discharge:    Review all your home medications and newly ordered medications with your doctor and/or pharmacist. Follow medication instructions as directed by your doctor and/or pharmacist.    Please keep your medication list with you and share with your physician.               Medication List      CHANGE how you take these medications        Instructions    lisinopril 2.5 MG Tabs   What changed:  Another medication with the same name was removed. Continue taking this medication, and follow the directions you see here.   Commonly known as:  PRINIVIL   Next Dose Due:  tomorrow    Take 2.5 mg by mouth every day.   Dose:  2.5 mg         CONTINUE taking these medications        Instructions    ACTOS 45 MG Tabs   Generic drug:  pioglitazone   Next Dose Due:  today    Take 45 mg by mouth every day.   Dose:  45 mg       aspirin EC 81 MG Tbec   Commonly known as:  ECOTRIN   Next Dose Due:  Today      Take 81 mg by mouth every day.   Dose:  81 mg       atorvastatin 20 MG Tabs   Last time this was given:  20 mg on 3/3/2017  8:47 AM   Commonly known as:  LIPITOR   Next Dose Due:  today    Take 1 Tab by mouth every day.   Dose:  20 mg       budesonide-formoterol 160-4.5 MCG/ACT Aero   Commonly known as:  SYMBICORT   Next Dose Due:  tonight    Inhale 2 Puffs by mouth 2 Times a Day.   Dose:  2 Puff       carvedilol 3.125 MG Tabs   Last time this was given:  3.125 mg on 3/3/2017  8:47 AM   Commonly known as:  COREG   Next Dose Due:  Tonight with dinner    Take 1 Tab by mouth 2 times a day, with meals.   Dose:  3.125 mg       furosemide 40 MG Tabs   Last time this was given:  40 mg on 3/3/2017  8:47 AM   Commonly known as:  LASIX   Next Dose Due:  This afternoon    Take 40 mg by mouth. 1 TAB IN AM; 1/2 TAB IN AFTERNOON   Dose:  40  mg       glipiZIDE SR 2.5 MG Tb24   Commonly known as:  GLUCOTROL   Next Dose Due:  today    Take 5 mg by mouth every day.   Dose:  5 mg       montelukast 10 MG Tabs   Commonly known as:  SINGULAIR   Next Dose Due:  tonight    Take 10 mg by mouth every evening.   Dose:  10 mg       MULTI-VITAMIN DAILY PO   Next Dose Due:  tomorrow    Take  by mouth.       nitroglycerin 0.4 MG Subl   Commonly known as:  NITROSTAT   Next Dose Due:  Anytime for chest pain    Place 1 Tab under tongue as needed. PLACE 1 TABLET UNDER TONGUE AS NEEDED FOR CHEST PAIN   Dose:  0.4 mg       potassium citrate SR 10 MEQ (1080 MG) Tbcr   Commonly known as:  UROCIT-K SR   Next Dose Due:  tonight    Take 10 mEq by mouth 2 Times a Day.   Dose:  10 mEq       SAW PALMETTO PO    Take  by mouth.       tamsulosin 0.4 MG capsule   Last time this was given:  0.4 mg on 3/3/2017  8:47 AM   Commonly known as:  FLOMAX   Next Dose Due:  Tomorrow     Take 0.4 mg by mouth ONE-HALF HOUR AFTER BREAKFAST.   Dose:  0.4 mg       vitamin D 1000 UNIT Tabs   Commonly known as:  cholecalciferol   Next Dose Due:  tomorrow    Take 2,000 Units by mouth every day.   Dose:  2000 Units       zolpidem 10 MG Tabs   Last time this was given:  10 mg on 3/2/2017 10:35 PM   Commonly known as:  AMBIEN   Next Dose Due:  tonight    Take 10 mg by mouth at bedtime as needed for Sleep.   Dose:  10 mg               Instructions           Diet / Nutrition:    Follow any diet instructions given to you by your doctor or the dietician, including how much salt (sodium) you are allowed each day.    If you are overweight, talk to your doctor about a weight reduction plan.    Activity:    Remain physically active following your doctor's instructions about exercise and activity.    Rest often.     Any time you become even a little tired or short of breath, SIT DOWN and rest.    Worsening Symptoms:    Report any of the following signs and symptoms to the doctor's office immediately:    *Pain of jaw,  arm, or neck  *Chest pain not relieved by medication                               *Dizziness or loss of consciousness  *Difficulty breathing even when at rest   *More tired than usual                                       *Bleeding drainage or swelling of surgical site  *Swelling of feet, ankles, legs or stomach                 *Fever (>100ºF)  *Pink or blood tinged sputum  *Weight gain (3lbs/day or 5lbs /week)           *Shock from internal defibrillator (if applicable)  *Palpitations or irregular heartbeats                *Cool and/or numb extremities    Stroke Awareness    Common Risk Factors for Stroke include:    Age  Atrial Fibrillation  Carotid Artery Stenosis  Diabetes Mellitus  Excessive alcohol consumption  High blood pressure  Overweight   Physical inactivity  Smoking    Warning signs and symptoms of a stroke include:    *Sudden numbness or weakness of the face, arm or leg (especially on one side of the body).  *Sudden confusion, trouble speaking or understanding.  *Sudden trouble seeing in one or both eyes.  *Sudden trouble walking, dizziness, loss of balance or coordination.Sudden severe headache with no known cause.    It is very important to get treatment quickly when a stroke occurs. If you experience any of the above warning signs, call 911 immediately.                   Disclaimer         Quit Smoking / Tobacco Use:    I understand the use of any tobacco products increases my chance of suffering from future heart disease or stroke and could cause other illnesses which may shorten my life. Quitting the use of tobacco products is the single most important thing I can do to improve my health. For further information on smoking / tobacco cessation call a Toll Free Quit Line at 1-904.708.4739 (*National Cancer [place]) or 1-965.661.7467 (American Lung Association) or you can access the web based program at www.lungusa.org.    [place] Tobacco Users Help Line:  (947) 813-6051       Toll Free:  1-517-042-3316  Quit Tobacco Program [facility] (531)212-3596    Crisis Hotline:    National Crisis Hotline:  6-940-OELMKHM or 1-750.520.5241    [place] Crisis Hotline:    1-610.289.8099 or 610-338-9016    Discharge Survey:   Thank you for choosing [facility]. We hope we did everything we could to make your hospital stay a pleasant one. You may be receiving a phone survey and we would appreciate your time and participation in answering the questions. Your input is very valuable to us in our efforts to improve our service to our patients and their families.        My signature on this form indicates that:    1. I have reviewed and understand the above information.  2. My questions regarding this information have been answered to my satisfaction.  3. I have formulated a plan with my discharge nurse to obtain my prescribed medications for home.                  Disclaimer         __________________________________                     __________       ________                       Patient Signature                                                 Date                    Time

## 2017-03-02 NOTE — IP AVS SNAPSHOT
3/3/2017          Juliocesar Salas  [id number] [address]    Dear Juliocesar:    [facility] wants to ensure your discharge home is safe and you or your loved ones have had all your questions answered regarding your care after you leave the hospital.    You may receive a telephone call within two days of your discharge.  This call is to make certain you understand your discharge instructions as well as ensure we provided you with the best care possible during your stay with us.     The call will only last approximately 3-5 minutes and will be done by a nurse.    Once again, we want to ensure your discharge home is safe and that you have a clear understanding of any next steps in your care.  If you have any questions or concerns, please do not hesitate to contact us, we are here for you.  Thank you for choosing [facility] for your healthcare needs.    Sincerely,    Cooper Diaz    [facility]

## 2017-03-03 ENCOUNTER — APPOINTMENT (OUTPATIENT)
Dept: RADIOLOGY | Facility: MEDICAL CENTER | Age: 82
End: 2017-03-03
Attending: INTERNAL MEDICINE
Payer: MEDICARE

## 2017-03-03 VITALS
TEMPERATURE: 98 F | OXYGEN SATURATION: 95 % | HEART RATE: 85 BPM | WEIGHT: 194.45 LBS | SYSTOLIC BLOOD PRESSURE: 106 MMHG | BODY MASS INDEX: 29.47 KG/M2 | HEIGHT: 68 IN | DIASTOLIC BLOOD PRESSURE: 69 MMHG | RESPIRATION RATE: 20 BRPM

## 2017-03-03 LAB — EKG IMPRESSION: NORMAL

## 2017-03-03 PROCEDURE — 700102 HCHG RX REV CODE 250 W/ 637 OVERRIDE(OP): Performed by: INTERNAL MEDICINE

## 2017-03-03 PROCEDURE — 71010 DX-CHEST-PORTABLE (1 VIEW): CPT

## 2017-03-03 PROCEDURE — G0378 HOSPITAL OBSERVATION PER HR: HCPCS

## 2017-03-03 PROCEDURE — A9270 NON-COVERED ITEM OR SERVICE: HCPCS | Performed by: INTERNAL MEDICINE

## 2017-03-03 PROCEDURE — 93005 ELECTROCARDIOGRAM TRACING: CPT | Performed by: INTERNAL MEDICINE

## 2017-03-03 PROCEDURE — 93010 ELECTROCARDIOGRAM REPORT: CPT | Performed by: INTERNAL MEDICINE

## 2017-03-03 RX ADMIN — CARVEDILOL 3.12 MG: 3.12 TABLET, FILM COATED ORAL at 08:47

## 2017-03-03 RX ADMIN — ATORVASTATIN CALCIUM 20 MG: 20 TABLET, FILM COATED ORAL at 08:47

## 2017-03-03 RX ADMIN — TAMSULOSIN HYDROCHLORIDE 0.4 MG: 0.4 CAPSULE ORAL at 08:47

## 2017-03-03 RX ADMIN — FUROSEMIDE 40 MG: 40 TABLET ORAL at 08:47

## 2017-03-03 ASSESSMENT — COPD QUESTIONNAIRES
DO YOU EVER COUGH UP ANY MUCUS OR PHLEGM?: NO/ONLY WITH OCCASIONAL COLDS OR INFECTIONS
DURING THE PAST 4 WEEKS HOW MUCH DID YOU FEEL SHORT OF BREATH: NONE/LITTLE OF THE TIME
COPD SCREENING SCORE: 2
HAVE YOU SMOKED AT LEAST 100 CIGARETTES IN YOUR ENTIRE LIFE: NO/DON'T KNOW

## 2017-03-03 ASSESSMENT — PAIN SCALES - GENERAL: PAINLEVEL_OUTOF10: 0

## 2017-03-03 NOTE — CARE PLAN
Problem: Communication  Goal: The ability to communicate needs accurately and effectively will improve  Intervention: Use communication aids and/or /Language Line as appropriate  White board updated with POC and care team information upon pt arrival to unit      Problem: Respiratory:  Goal: Respiratory status will improve  Pt weaned from 10L to RA since arrival from PACU

## 2017-03-03 NOTE — RESPIRATORY CARE
COPD EDUCATION by COPD CLINICAL EDUCATOR  3/3/2017 at 6:14 AM by Lynette Rodrigues     Patient reviewed by COPD education team. Patient does not qualify for COPD program.

## 2017-03-03 NOTE — PROGRESS NOTES
"2 RN skin assessment completed with ZAYNAB Hernandez. PT noted to have BLE edema; a red but blanching sacrum; and generalized bruising/ \"cat scratches\". Pt has red marks on his anterior and posterior trunk from pacer pads.   "

## 2017-03-03 NOTE — PROGRESS NOTES
Pt d/c home. Went over d/c instructions, medications and follow up appointments with pt and family. Pt gathered all belongings.

## 2017-03-03 NOTE — DISCHARGE INSTRUCTIONS
Discharge Instructions    Discharged to home by car with relative. Discharged via wheelchair, hospital escort: Yes.  Special equipment needed: Not Applicable    Be sure to schedule a follow-up appointment with your primary care doctor or any specialists as instructed.     Discharge Plan:   Diet Plan: Discussed  Activity Level: Discussed  Confirmed Follow up Appointment: Patient to Call and Schedule Appointment  Confirmed Symptoms Management: Discussed  Medication Reconciliation Updated: Yes  Influenza Vaccine Indication: Patient Refuses    I understand that a diet low in cholesterol, fat, and sodium is recommended for good health. Unless I have been given specific instructions below for another diet, I accept this instruction as my diet prescription.   Other diet: cardiac, low sodium    Special Instructions: None    · Is patient discharged on Warfarin / Coumadin?   No     · Is patient Post Blood Transfusion?  No    Depression / Suicide Risk    As you are discharged from this [facility] facility, it is important to learn how to keep safe from harming yourself.    Recognize the warning signs:  · Abrupt changes in personality, positive or negative- including increase in energy   · Giving away possessions  · Change in eating patterns- significant weight changes-  positive or negative  · Change in sleeping patterns- unable to sleep or sleeping all the time   · Unwillingness or inability to communicate  · Depression  · Unusual sadness, discouragement and loneliness  · Talk of wanting to die  · Neglect of personal appearance   · Rebelliousness- reckless behavior  · Withdrawal from people/activities they love  · Confusion- inability to concentrate     If you or a loved one observes any of these behaviors or has concerns about self-harm, here's what you can do:  · Talk about it- your feelings and reasons for harming yourself  · Remove any means that you might use to hurt yourself (examples: pills, rope, extension cords,  firearm)  · Get professional help from the community (Mental Health, Substance Abuse, psychological counseling)  · Do not be alone:Call your Safe Contact- someone whom you trust who will be there for you.  · Call your local CRISIS HOTLINE 622-3277 or 118-897-4494  · Call your local Children's Mobile Crisis Response Team [place] (039) 973-6223 or www.[employer]  · Call the toll free National Suicide Prevention Hotlines   · National Suicide Prevention Lifeline 656-743-APOO (5052)  · [employer] Line Network 800-SUICIDE (271-7557)  Pacemaker Implantation  The heart has its own electrical system, or natural pacemaker, to regulate the heartbeat. Sometimes, the natural pacemaker system of the heart fails and causes the heart to beat too slowly. If this happens, a pacemaker can be surgically placed to help the heart beat at a normal or programmed rate. A pacemaker is a small, battery-powered device that is placed under the skin and is programmed to sense your heartbeats. If your heart rate is lower than the programmed rate, the pacemaker will pace your heart. Parts of a pacemaker include:  · Wires or leads. The leads are placed in the heart and transmit electricity to the heart. The leads are connected to the pulse generator.  · Pulse generator. The pulse generator contains a computer and a memory system. The pulse generator also produces the electrical signal that triggers the heart to beat.  A pacemaker may be placed if:  · You have a slow heartbeat (bradycardia).  · You have fainting (syncope).  · Shortness of breath (dyspnea) due to heart problems.  LET YOUR HEALTH CARE PROVIDER KNOW ABOUT:  · Any allergies you may have.  · All medicines you are taking, including vitamins, herbs, eye drops, creams, and over-the-counter medicines.  · Previous problems you or members of your family have had with the use of anesthetics.  · Any blood disorders you have.  · Previous surgeries you have had.  · Medical  conditions you have.  · Possibility of pregnancy, if this applies.  RISKS AND COMPLICATIONS  Generally, pacemaker implantation is a safe procedure. However, problems can occur and include:  · Bleeding.  · Unable to place the pacemaker under local sedation.  · Infection.  BEFORE THE PROCEDURE  · You will have blood work drawn before the procedure.  · Do not use any tobacco products including cigarettes, chewing tobacco, or electronic cigarettes. If you need help quitting, ask your health care provider.  · Do not eat or drink anything after midnight on the night before the procedure or as directed by your health care provider.  · Ask your health care provider about:  ¨ Changing or stopping your regular medicines. This is especially important if you are taking diabetes medicines or blood thinners.  ¨ Taking medicines such as aspirin and ibuprofen. These medicines can thin your blood. Do not take these medicines before your procedure if your health care provider asks you not to.  · Ask your health care provider if you can take a sip of water with any approved medicines the morning of the procedure.  PROCEDURE   The surgery to place a pacemaker is considered a minimally invasive surgical procedure. It is done under a local anesthetic, which is an injection at the incision site that makes the skin numb. You are also given sedation and pain medicine that makes you drowsy during the procedure.   · An intravenous line (IV) will be started in your hand or arm so sedation and pain medicine can be given during the pacemaker procedure.  · A numbing medicine will be injected into the skin where the pacemaker is to be placed. A small incision will then be made into the skin. The pacemaker is usually placed under the skin near the collarbone.  · After the incision has been made, the leads will be inserted into a large vein and guided into the heart using X-ray.  · Using the same incision that was used to place the leads, a small  pocket will be created under the skin to hold the pulse generator. The leads will then be connected to the pulse generator.  · The incision site will then be closed. A bandage (dressing) is placed over the pacemaker site. The dressing is removed 24-48 hours afterward.  AFTER THE PROCEDURE  · You will be taken to a recovery area after the pacemaker implant. Your vital signs such as blood pressure, heart rate, breathing, and oxygen levels will be monitored.  · A chest X-ray will be done after the pacemaker has been implanted. This is to make sure the pacemaker and leads are in the correct place.     This information is not intended to replace advice given to you by your health care provider. Make sure you discuss any questions you have with your health care provider.     Document Released: 12/08/2003 Document Revised: 01/08/2016 Document Reviewed: 04/23/2013  [employer] Interactive Patient Education ©2016 [employer]    No lifting over 5 # with the left arm. No above the head activities with the left arm. Immobilizer at night. Follow-up in [facility] in 1 week.   Report any swelling or redness at the site.

## 2017-03-03 NOTE — PROGRESS NOTES
Pt arrived on unit via gurney. Transferred to bed by himself. Appropriate functioning of tele monitor confirmed with monitor room, rate and rhythm verified. White board updated. Pt updated on POC. VSS. All needs met at this time.

## 2017-03-03 NOTE — PROGRESS NOTES
Report received from ZAYNAB Marx. Introductions made. Plan of care reviewed with patient. Call light within reach. Patient resting in bed, no complaints of pain. Bed alarm on. Will continue to monitor.  Fawad Crowley R.N.

## 2017-03-03 NOTE — CARE PLAN
Problem: Safety  Goal: Will remain free from injury  Outcome: PROGRESSING AS EXPECTED  Patient calls appropriately for staff assistance and remains free of injury    Problem: Infection  Goal: Will remain free from infection  Outcome: PROGRESSING AS EXPECTED  Patient demonstrates proper hand hygiene and remains free of infection

## 2017-03-06 ENCOUNTER — TELEPHONE (OUTPATIENT)
Dept: CARDIOLOGY | Facility: MEDICAL CENTER | Age: 82
End: 2017-03-06

## 2017-03-06 NOTE — TELEPHONE ENCOUNTER
----- Message from Lynette Rios sent at 3/6/2017  2:51 PM PST -----  Regarding: pt fell twice, pain in p/m area  Contact: [phone]  AZALIA/lana Oro RN calling from [facility] to report the pt fell  on 3/3 going into his home and fell again last nite, out of bed.    Pt's heart rate today is 122, has pain in pacemaker area and his feet are very red & swollen, pt thinks it's gout. Please call Preethi to discuss [phone].

## 2017-03-06 NOTE — TELEPHONE ENCOUNTER
S/w both pt. & RN on speaker phone. Has numerous issues  1) fell x 2 since PM on 3/2. Pt. thinks they were both related to stumbling - not to lightheadedness, near syncope or HR irregularity, but no way to really be sure. Did land near PM site on one fall.  2) HR currently fast  irregular per R.N. At one point was 122  3) severe pain in toe - red and inflamed - possible gout attack  I advised they go to [facility] due to multiple issues  To Dr. Hernandez

## 2017-04-03 ENCOUNTER — OFFICE VISIT (OUTPATIENT)
Dept: CARDIOLOGY | Facility: PHYSICIAN GROUP | Age: 82
End: 2017-04-03
Payer: MEDICARE

## 2017-04-03 VITALS
WEIGHT: 188 LBS | DIASTOLIC BLOOD PRESSURE: 50 MMHG | BODY MASS INDEX: 28.49 KG/M2 | SYSTOLIC BLOOD PRESSURE: 80 MMHG | OXYGEN SATURATION: 88 % | HEIGHT: 68 IN | HEART RATE: 74 BPM

## 2017-04-03 DIAGNOSIS — I50.42 CHRONIC COMBINED SYSTOLIC AND DIASTOLIC CHF, NYHA CLASS 4 (HCC): ICD-10-CM

## 2017-04-03 DIAGNOSIS — Z95.0 CARDIAC PACEMAKER IN SITU: ICD-10-CM

## 2017-04-03 DIAGNOSIS — I25.10 CORONARY ARTERY DISEASE INVOLVING NATIVE CORONARY ARTERY OF NATIVE HEART WITHOUT ANGINA PECTORIS: ICD-10-CM

## 2017-04-03 DIAGNOSIS — I25.5 ISCHEMIC CARDIOMYOPATHY: ICD-10-CM

## 2017-04-03 DIAGNOSIS — E78.2 MIXED HYPERLIPIDEMIA: ICD-10-CM

## 2017-04-03 PROCEDURE — G8419 CALC BMI OUT NRM PARAM NOF/U: HCPCS | Performed by: INTERNAL MEDICINE

## 2017-04-03 PROCEDURE — 1036F TOBACCO NON-USER: CPT | Performed by: INTERNAL MEDICINE

## 2017-04-03 PROCEDURE — 4040F PNEUMOC VAC/ADMIN/RCVD: CPT | Performed by: INTERNAL MEDICINE

## 2017-04-03 PROCEDURE — G8432 DEP SCR NOT DOC, RNG: HCPCS | Performed by: INTERNAL MEDICINE

## 2017-04-03 PROCEDURE — 99213 OFFICE O/P EST LOW 20 MIN: CPT | Performed by: INTERNAL MEDICINE

## 2017-04-03 PROCEDURE — 1101F PT FALLS ASSESS-DOCD LE1/YR: CPT | Mod: 8P | Performed by: INTERNAL MEDICINE

## 2017-04-03 PROCEDURE — G8598 ASA/ANTIPLAT THER USED: HCPCS | Performed by: INTERNAL MEDICINE

## 2017-04-03 RX ORDER — ATORVASTATIN CALCIUM 20 MG/1
20 TABLET, FILM COATED ORAL DAILY
Qty: 30 TAB | Refills: 11 | Status: ON HOLD | OUTPATIENT
Start: 2017-04-03 | End: 2019-03-04

## 2017-04-03 ASSESSMENT — ENCOUNTER SYMPTOMS
WHEEZING: 0
MYALGIAS: 0
FALLS: 0
PND: 0
COUGH: 0
ORTHOPNEA: 0
BRUISES/BLEEDS EASILY: 0

## 2017-04-03 ASSESSMENT — LIFESTYLE VARIABLES: SUBSTANCE_ABUSE: 0

## 2017-04-03 NOTE — Clinical Note
[facility] [address]  Phone: [phone]  Fax: [phone]              Juliocesar Salas  10/25/1934    Encounter Date: 4/3/2017    Osmany Hernandez M.D.          PROGRESS NOTE:  Subjective:   Juliocesar Salas is a 82 y.o. male who presents today for follow-up of his chronic congestive heart failure. Although a left ventricular lead could not be placed, he seems significantly better since his pacemaker. Dyspnea is less and for the most part his congestive symptoms are functional class 2-3 although his low output symptoms would be class IV. He is doing well with no angina.    Past Medical History   Diagnosis Date   • Hypertension    • Arthritis    • Diabetes 1988     oral agents   • Asthma    • Coronary artery disease involving native coronary artery without angina pectoris 11/25/2014     Severe stenosis of LAD and ramus intermedius with moderate proximal RCA and CFX disease and severe distal disease, PCI not successful and CABG declined by Dr. Mustafa and family.   • Non-STEMI (non-ST elevated myocardial infarction) (CMS-HCC)    • Apical mural thrombus following MI (CMS-HCC)    • Rectus sheath hematoma 12/3/2014   • Cardiac pacemaker in situ 3/2/2017     Pulse generator is a SJM model LS2756, serial # [id number], RA lead: SJM model #2088TC/46 , serial #[id number], RV lead: SJM model #2088TC/52 , serial #[id number], Dr. Jarrell      Past Surgical History   Procedure Laterality Date   • Knee arthroplasty total  2002     bilateral   • Hip arthroplasty total  2007     right   • Colonoscopy  2/2012   • Inguinal hernia repair  1986     right x2   • Hip arthroplasty total  3/5/2012     Performed by OSMANY ORR at [facility]   • Cataract extraction with iol       Family History   Problem Relation Age of Onset   • Diabetes     • Heart Disease     • Hypertension     • Heart Attack Father 65     MI     History   Smoking status   •  Former Smoker -- 1 years   • Types: Cigars   • Start date: 01/01/1993   • Quit date: 01/01/1994   Smokeless tobacco   • Former User   • Types: Chew   • Quit date: 01/01/1994     Allergies   Allergen Reactions   • Nkda [No Known Drug Allergy]      Outpatient Encounter Prescriptions as of 4/3/2017   Medication Sig Dispense Refill   • atorvastatin (LIPITOR) 20 MG Tab Take 1 Tab by mouth every day. 30 Tab 11   • lisinopril (PRINIVIL) 2.5 MG Tab Take 2.5 mg by mouth every day.     • glipiZIDE SR (GLUCOTROL) 2.5 MG TABLET SR 24 HR Take 5 mg by mouth every day.     • zolpidem (AMBIEN) 10 MG Tab Take 10 mg by mouth at bedtime as needed for Sleep.     • carvedilol (COREG) 3.125 MG Tab Take 1 Tab by mouth 2 times a day, with meals. 180 Tab 3   • tamsulosin (FLOMAX) 0.4 MG capsule Take 0.4 mg by mouth ONE-HALF HOUR AFTER BREAKFAST.     • furosemide (LASIX) 40 MG Tab Take 40 mg by mouth. 1 TAB IN AM; 1/2 TAB IN AFTERNOON     • aspirin EC (ECOTRIN) 81 MG Tablet Delayed Response Take 81 mg by mouth every day.     • nitroglycerin (NITROSTAT) 0.4 MG SL Tab Place 1 Tab under tongue as needed. PLACE 1 TABLET UNDER TONGUE AS NEEDED FOR CHEST PAIN 25 Tab 5   • budesonide-formoterol (SYMBICORT) 160-4.5 MCG/ACT AERO Inhale 2 Puffs by mouth 2 Times a Day.     • Multiple Vitamin (MULTI-VITAMIN DAILY PO) Take  by mouth.     • vitamin D (CHOLECALCIFEROL) 1000 UNIT TABS Take 2,000 Units by mouth every day.     • potassium citrate SR (UROCIT-K SR) 10 MEQ (1080 MG) TBCR Take 10 mEq by mouth 2 Times a Day.     • montelukast (SINGULAIR) 10 MG Tab Take 10 mg by mouth every evening.     • [DISCONTINUED] atorvastatin (LIPITOR) 20 MG Tab Take 1 Tab by mouth every day. 30 Tab 11   • Saw Palmetto, Serenoa repens, (SAW PALMETTO PO) Take  by mouth.     • [DISCONTINUED] pioglitazone (ACTOS) 45 MG TABS Take 45 mg by mouth every day.       No facility-administered encounter medications on file as of 4/3/2017.     Review of Systems   HENT: Negative for  "nosebleeds.    Respiratory: Negative for cough and wheezing.    Cardiovascular: Negative for orthopnea and PND.   Musculoskeletal: Negative for myalgias and falls.   Endo/Heme/Allergies: Does not bruise/bleed easily.   Psychiatric/Behavioral: Negative for substance abuse.        Objective:   BP 80/50 mmHg  Pulse 74  Ht 1.727 m (5' 7.99\")  Wt 85.276 kg (188 lb)  BMI 28.59 kg/m2  SpO2 88%    Physical Exam   Constitutional: He is oriented to person, place, and time. He appears well-developed and well-nourished.   Eyes: Conjunctivae are normal. No scleral icterus.   Neck: No JVD present.   Cardiovascular: Normal rate, regular rhythm and intact distal pulses.  Exam reveals no gallop.    No murmur heard.  Pulmonary/Chest: Effort normal and breath sounds normal.   Musculoskeletal: Normal range of motion. He exhibits no edema.   Neurological: He is alert and oriented to person, place, and time.   Skin: Skin is warm and dry.   Psychiatric: He has a normal mood and affect. Thought content normal.       Assessment:     1. Coronary artery disease involving native coronary artery of native heart without angina pectoris     2. Ischemic cardiomyopathy     3. Chronic combined systolic and diastolic CHF, NYHA class 4 (CMS-[facility])     4. Cardiac pacemaker in situ     5. Mixed hyperlipidemia  atorvastatin (LIPITOR) 20 MG Tab     The above assessed cardiovascular problems are clinically stable.  Medical Decision Making:  Today's Assessment / Status / Plan:     I made no change in his regimen today and he seems to tolerate this relatively low blood pressure well with actually improved stamina. He will be due for a pacemaker check in 3 weeks and immediate reevaluation if symptoms start to deteriorate.      No Recipients                "

## 2017-04-03 NOTE — PROGRESS NOTES
Subjective:   Juliocesar Salas is a 82 y.o. male who presents today for follow-up of his chronic congestive heart failure. Although a left ventricular lead could not be placed, he seems significantly better since his pacemaker. Dyspnea is less and for the most part his congestive symptoms are functional class 2-3 although his low output symptoms would be class IV. He is doing well with no angina.    Past Medical History   Diagnosis Date   • Hypertension    • Arthritis    • Diabetes 1988     oral agents   • Asthma    • Coronary artery disease involving native coronary artery without angina pectoris 11/25/2014     Severe stenosis of LAD and ramus intermedius with moderate proximal RCA and CFX disease and severe distal disease, PCI not successful and CABG declined by Dr. Mustafa and family.   • Non-STEMI (non-ST elevated myocardial infarction) (CMS-HCC)    • Apical mural thrombus following MI (CMS-HCC)    • Rectus sheath hematoma 12/3/2014   • Cardiac pacemaker in situ 3/2/2017     Pulse generator is a SJM model EB2501, serial # [id number], RA lead: SJM model #2088TC/46 , serial #[id number], RV lead: SJM model #2088TC/52 , serial #[id number], Dr. Jarrell      Past Surgical History   Procedure Laterality Date   • Knee arthroplasty total  2002     bilateral   • Hip arthroplasty total  2007     right   • Colonoscopy  2/2012   • Inguinal hernia repair  1986     right x2   • Hip arthroplasty total  3/5/2012     Performed by OSMANY ORR at [facility] ORS   • Cataract extraction with iol       Family History   Problem Relation Age of Onset   • Diabetes     • Heart Disease     • Hypertension     • Heart Attack Father 65     MI     History   Smoking status   • Former Smoker -- 1 years   • Types: Cigars   • Start date: 01/01/1993   • Quit date: 01/01/1994   Smokeless tobacco   • Former User   • Types: Chew   • Quit date: 01/01/1994     Allergies   Allergen Reactions   • Nkda [No Known Drug Allergy]      Outpatient Encounter  Prescriptions as of 4/3/2017   Medication Sig Dispense Refill   • atorvastatin (LIPITOR) 20 MG Tab Take 1 Tab by mouth every day. 30 Tab 11   • lisinopril (PRINIVIL) 2.5 MG Tab Take 2.5 mg by mouth every day.     • glipiZIDE SR (GLUCOTROL) 2.5 MG TABLET SR 24 HR Take 5 mg by mouth every day.     • zolpidem (AMBIEN) 10 MG Tab Take 10 mg by mouth at bedtime as needed for Sleep.     • carvedilol (COREG) 3.125 MG Tab Take 1 Tab by mouth 2 times a day, with meals. 180 Tab 3   • tamsulosin (FLOMAX) 0.4 MG capsule Take 0.4 mg by mouth ONE-HALF HOUR AFTER BREAKFAST.     • furosemide (LASIX) 40 MG Tab Take 40 mg by mouth. 1 TAB IN AM; 1/2 TAB IN AFTERNOON     • aspirin EC (ECOTRIN) 81 MG Tablet Delayed Response Take 81 mg by mouth every day.     • nitroglycerin (NITROSTAT) 0.4 MG SL Tab Place 1 Tab under tongue as needed. PLACE 1 TABLET UNDER TONGUE AS NEEDED FOR CHEST PAIN 25 Tab 5   • budesonide-formoterol (SYMBICORT) 160-4.5 MCG/ACT AERO Inhale 2 Puffs by mouth 2 Times a Day.     • Multiple Vitamin (MULTI-VITAMIN DAILY PO) Take  by mouth.     • vitamin D (CHOLECALCIFEROL) 1000 UNIT TABS Take 2,000 Units by mouth every day.     • potassium citrate SR (UROCIT-K SR) 10 MEQ (1080 MG) TBCR Take 10 mEq by mouth 2 Times a Day.     • montelukast (SINGULAIR) 10 MG Tab Take 10 mg by mouth every evening.     • [DISCONTINUED] atorvastatin (LIPITOR) 20 MG Tab Take 1 Tab by mouth every day. 30 Tab 11   • Saw Palmetto, Serenoa repens, (SAW PALMETTO PO) Take  by mouth.     • [DISCONTINUED] pioglitazone (ACTOS) 45 MG TABS Take 45 mg by mouth every day.       No facility-administered encounter medications on file as of 4/3/2017.     Review of Systems   HENT: Negative for nosebleeds.    Respiratory: Negative for cough and wheezing.    Cardiovascular: Negative for orthopnea and PND.   Musculoskeletal: Negative for myalgias and falls.   Endo/Heme/Allergies: Does not bruise/bleed easily.   Psychiatric/Behavioral: Negative for substance abuse.  "       Objective:   BP 80/50 mmHg  Pulse 74  Ht 1.727 m (5' 7.99\")  Wt 85.276 kg (188 lb)  BMI 28.59 kg/m2  SpO2 88%    Physical Exam   Constitutional: He is oriented to person, place, and time. He appears well-developed and well-nourished.   Eyes: Conjunctivae are normal. No scleral icterus.   Neck: No JVD present.   Cardiovascular: Normal rate, regular rhythm and intact distal pulses.  Exam reveals no gallop.    No murmur heard.  Pulmonary/Chest: Effort normal and breath sounds normal.   Musculoskeletal: Normal range of motion. He exhibits no edema.   Neurological: He is alert and oriented to person, place, and time.   Skin: Skin is warm and dry.   Psychiatric: He has a normal mood and affect. Thought content normal.       Assessment:     1. Coronary artery disease involving native coronary artery of native heart without angina pectoris     2. Ischemic cardiomyopathy     3. Chronic combined systolic and diastolic CHF, NYHA class 4 (CMS-[facility])     4. Cardiac pacemaker in situ     5. Mixed hyperlipidemia  atorvastatin (LIPITOR) 20 MG Tab     The above assessed cardiovascular problems are clinically stable.  Medical Decision Making:  Today's Assessment / Status / Plan:     I made no change in his regimen today and he seems to tolerate this relatively low blood pressure well with actually improved stamina. He will be due for a pacemaker check in 3 weeks and immediate reevaluation if symptoms start to deteriorate.  "

## 2017-04-05 DIAGNOSIS — R07.89 OTHER CHEST PAIN: ICD-10-CM

## 2017-04-05 RX ORDER — NITROGLYCERIN 0.4 MG/1
0.4 TABLET SUBLINGUAL PRN
Qty: 25 TAB | Refills: 5 | OUTPATIENT
Start: 2017-04-05 | End: 2018-07-13 | Stop reason: SDUPTHER

## 2017-04-20 ENCOUNTER — NON-PROVIDER VISIT (OUTPATIENT)
Dept: CARDIOLOGY | Facility: PHYSICIAN GROUP | Age: 82
End: 2017-04-20
Payer: MEDICARE

## 2017-04-20 VITALS
HEIGHT: 67 IN | DIASTOLIC BLOOD PRESSURE: 76 MMHG | HEART RATE: 95 BPM | WEIGHT: 188 LBS | BODY MASS INDEX: 29.51 KG/M2 | SYSTOLIC BLOOD PRESSURE: 114 MMHG | OXYGEN SATURATION: 95 %

## 2017-04-20 DIAGNOSIS — I42.9 CARDIOMYOPATHY (HCC): ICD-10-CM

## 2017-04-20 DIAGNOSIS — I50.42 CHRONIC COMBINED SYSTOLIC AND DIASTOLIC CHF, NYHA CLASS 4 (HCC): ICD-10-CM

## 2017-04-20 DIAGNOSIS — Z95.0 CARDIAC PACEMAKER IN SITU: ICD-10-CM

## 2017-04-20 PROCEDURE — 93288 INTERROG EVL PM/LDLS PM IP: CPT | Performed by: NURSE PRACTITIONER

## 2017-04-20 NOTE — PROGRESS NOTES
Patient was referred to EP for evaluation for CRT-P due to decreased LV function. CRT-P was implanted on 3/2/2017, but LV lead was unable to be passed, due to tortuosity.    Device is working normally. Mode switching episodes 23% of the time since 3/2/2017, last episode was 3/28/2017 for 3 days, but none since.  Normal sensing and capture of RA and RV leads; stable impedances. Battery longevity is 8.2-9.9 years.      Changes today include decreasing RA and RV lead outputs to 2.5 volts.    FU in 3 months for next PM check with me, and FU with Dr. Hernandez.    Collaborating MD: Consuelo

## 2017-04-20 NOTE — Clinical Note
[facility] [address]  Phone: [phone]  Fax: [phone]              Juliocesar Salas  10/25/1934    Encounter Date: 4/20/2017    ERNESTINA Harmon.P.N.          PROGRESS NOTE:  No notes on file      No Recipients

## 2017-07-27 ENCOUNTER — NON-PROVIDER VISIT (OUTPATIENT)
Dept: CARDIOLOGY | Facility: PHYSICIAN GROUP | Age: 82
End: 2017-07-27
Payer: MEDICARE

## 2017-07-27 ENCOUNTER — OFFICE VISIT (OUTPATIENT)
Dept: CARDIOLOGY | Facility: PHYSICIAN GROUP | Age: 82
End: 2017-07-27
Payer: MEDICARE

## 2017-07-27 VITALS
DIASTOLIC BLOOD PRESSURE: 64 MMHG | SYSTOLIC BLOOD PRESSURE: 108 MMHG | BODY MASS INDEX: 27.43 KG/M2 | OXYGEN SATURATION: 96 % | HEIGHT: 68 IN | WEIGHT: 181 LBS | HEART RATE: 82 BPM

## 2017-07-27 DIAGNOSIS — I42.9 CARDIOMYOPATHY, UNSPECIFIED TYPE (HCC): ICD-10-CM

## 2017-07-27 DIAGNOSIS — I25.10 CORONARY ARTERY DISEASE INVOLVING NATIVE CORONARY ARTERY OF NATIVE HEART WITHOUT ANGINA PECTORIS: ICD-10-CM

## 2017-07-27 DIAGNOSIS — I50.42 CHRONIC COMBINED SYSTOLIC AND DIASTOLIC CHF, NYHA CLASS 4 (HCC): ICD-10-CM

## 2017-07-27 DIAGNOSIS — I25.5 ISCHEMIC CARDIOMYOPATHY: ICD-10-CM

## 2017-07-27 DIAGNOSIS — E78.2 MIXED HYPERLIPIDEMIA: ICD-10-CM

## 2017-07-27 DIAGNOSIS — I50.42 CHRONIC COMBINED SYSTOLIC AND DIASTOLIC CHF (CONGESTIVE HEART FAILURE) (HCC): ICD-10-CM

## 2017-07-27 DIAGNOSIS — Z95.0 CARDIAC PACEMAKER IN SITU: ICD-10-CM

## 2017-07-27 DIAGNOSIS — I10 ESSENTIAL HYPERTENSION, BENIGN: ICD-10-CM

## 2017-07-27 PROCEDURE — 93288 INTERROG EVL PM/LDLS PM IP: CPT | Performed by: NURSE PRACTITIONER

## 2017-07-27 PROCEDURE — 99213 OFFICE O/P EST LOW 20 MIN: CPT | Performed by: INTERNAL MEDICINE

## 2017-07-27 ASSESSMENT — ENCOUNTER SYMPTOMS
ORTHOPNEA: 0
COUGH: 0
PND: 0
WHEEZING: 0
FALLS: 0
BRUISES/BLEEDS EASILY: 0
MYALGIAS: 0

## 2017-07-27 NOTE — PROGRESS NOTES
Subjective:   Juliocesar Salas is a 82 y.o. male who presents today for follow-up of congestive failure.  He has had no interval problems and is felt pretty well except for his arthritis. He has not had anything to suggest angina and no syncope nor near syncope nor congestive symptoms. Device function has been appropriate and was tested again today with minimal mode switching.  Past Medical History   Diagnosis Date   • Hypertension    • Arthritis    • Diabetes 1988     oral agents   • Asthma    • Coronary artery disease involving native coronary artery without angina pectoris 11/25/2014     Severe stenosis of LAD and ramus intermedius with moderate proximal RCA and CFX disease and severe distal disease, PCI not successful and CABG declined by Dr. Mustafa and family.   • Non-STEMI (non-ST elevated myocardial infarction) (CMS-HCC)    • Apical mural thrombus following MI    • Rectus sheath hematoma 12/3/2014   • Cardiac pacemaker in situ 3/2/2017     Pulse generator is a SJM model [id number], serial # [id number], RA lead: SJM model #2088TC/46 , serial #[id number], RV lead: SJM model #2088TC/52 , serial #[id number], Dr. Jarrell      Past Surgical History   Procedure Laterality Date   • Knee arthroplasty total  2002     bilateral   • Hip arthroplasty total  2007     right   • Colonoscopy  2/2012   • Inguinal hernia repair  1986     right x2   • Hip arthroplasty total  3/5/2012     Performed by O[facility] ORR at [facility] ORS   • Cataract extraction with iol     • Pacemaker insertion  March 2017     [employer] RF [id number] implanted by Dr. Jarrell. LV lead unable to be placed.     Family History   Problem Relation Age of Onset   • Diabetes     • Heart Disease     • Hypertension     • Heart Attack Father 65     MI     History   Smoking status   • Former Smoker -- 1 years   • Types: Cigars   • Start date: 01/01/1993   • Quit date: 01/01/1994   Smokeless tobacco   • Former User   • Types: Chew   • Quit date: 01/01/1994  "    Allergies   Allergen Reactions   • Nkda [No Known Drug Allergy]      Outpatient Encounter Prescriptions as of 7/27/2017   Medication Sig Dispense Refill   • nitroglycerin (NITROSTAT) 0.4 MG SL Tab Place 1 Tab under tongue as needed. 25 Tab 5   • atorvastatin (LIPITOR) 20 MG Tab Take 1 Tab by mouth every day. 30 Tab 11   • lisinopril (PRINIVIL) 2.5 MG Tab Take 2.5 mg by mouth every day.     • glipiZIDE SR (GLUCOTROL) 2.5 MG TABLET SR 24 HR Take 5 mg by mouth every day.     • zolpidem (AMBIEN) 10 MG Tab Take 10 mg by mouth at bedtime as needed for Sleep.     • carvedilol (COREG) 3.125 MG Tab Take 1 Tab by mouth 2 times a day, with meals. 180 Tab 3   • tamsulosin (FLOMAX) 0.4 MG capsule Take 0.4 mg by mouth ONE-HALF HOUR AFTER BREAKFAST.     • furosemide (LASIX) 40 MG Tab Take 40 mg by mouth. 1 TAB IN AM; 1/2 TAB IN AFTERNOON     • aspirin EC (ECOTRIN) 81 MG Tablet Delayed Response Take 81 mg by mouth every day.     • budesonide-formoterol (SYMBICORT) 160-4.5 MCG/ACT AERO Inhale 2 Puffs by mouth 2 Times a Day.     • Multiple Vitamin (MULTI-VITAMIN DAILY PO) Take  by mouth.     • vitamin D (CHOLECALCIFEROL) 1000 UNIT TABS Take 2,000 Units by mouth every day.     • potassium citrate SR (UROCIT-K SR) 10 MEQ (1080 MG) TBCR Take 10 mEq by mouth 2 Times a Day.     • [DISCONTINUED] montelukast (SINGULAIR) 10 MG Tab Take 10 mg by mouth every evening.     • [DISCONTINUED] Saw Palmetto, Serenoa repens, (SAW PALMETTO PO) Take  by mouth.       No facility-administered encounter medications on file as of 7/27/2017.     Review of Systems   HENT: Negative for nosebleeds.    Respiratory: Negative for cough and wheezing.    Cardiovascular: Negative for orthopnea and PND.   Musculoskeletal: Negative for myalgias and falls.   Endo/Heme/Allergies: Does not bruise/bleed easily.        Objective:   /64 mmHg  Pulse 82  Ht 1.727 m (5' 8\")  Wt 82.101 kg (181 lb)  BMI 27.53 kg/m2  SpO2 96%    Physical Exam   Constitutional: He is " oriented to person, place, and time. He appears well-developed and well-nourished.   Eyes: Conjunctivae are normal. No scleral icterus.   Neck: No JVD present.   Cardiovascular: Normal rate, regular rhythm and intact distal pulses.  Exam reveals no gallop.    No murmur heard.  Pulmonary/Chest: Effort normal and breath sounds normal.   Musculoskeletal: Normal range of motion. He exhibits no edema.   Neurological: He is alert and oriented to person, place, and time.   Skin: Skin is warm and dry.   Psychiatric: He has a normal mood and affect. Thought content normal.       Assessment:     1. Coronary artery disease involving native coronary artery of native heart without angina pectoris     2. Ischemic cardiomyopathy     3. Chronic combined systolic and diastolic CHF (congestive heart failure) (CMS-HCC)     4. Essential hypertension, benign     5. Mixed hyperlipidemia       The above assessed cardiovascular problems are clinically stable.  Medical Decision Making:  Today's Assessment / Status / Plan:     Continue the current cardiovascular regimen.  Continue primary follow up with  Dr. Dixon.   Cardiology follow up in 4 months and  sooner if needed for any change.   CMP, TSH, LP, CBC before next visit.  Use of the emergency medical system reviewed.

## 2017-07-27 NOTE — Clinical Note
[facility] [address]  Phone: [phone]  Fax: [phone]              Juliocesar Salas  10/25/1934    Encounter Date: 7/27/2017    Osmany Hernandez M.D.          PROGRESS NOTE:  Subjective:   Juliocesar Salas is a 82 y.o. male who presents today for follow-up of congestive failure.  He has had no interval problems and is felt pretty well except for his arthritis. He has not had anything to suggest angina and no syncope nor near syncope nor congestive symptoms. Device function has been appropriate and was tested again today with minimal mode switching.  Past Medical History   Diagnosis Date   • Hypertension    • Arthritis    • Diabetes 1988     oral agents   • Asthma    • Coronary artery disease involving native coronary artery without angina pectoris 11/25/2014     Severe stenosis of LAD and ramus intermedius with moderate proximal RCA and CFX disease and severe distal disease, PCI not successful and CABG declined by Dr. Mustafa and family.   • Non-STEMI (non-ST elevated myocardial infarction) (CMS-HCC)    • Apical mural thrombus following MI    • Rectus sheath hematoma 12/3/2014   • Cardiac pacemaker in situ 3/2/2017     Pulse generator is a SJM model [id number], serial # [id number], RA lead: SJM model #2088TC/46 , serial #[id number], RV lead: SJM model #2088TC/52 , serial #[id number], Dr. Jarrell      Past Surgical History   Procedure Laterality Date   • Knee arthroplasty total  2002     bilateral   • Hip arthroplasty total  2007     right   • Colonoscopy  2/2012   • Inguinal hernia repair  1986     right x2   • Hip arthroplasty total  3/5/2012     Performed by OSMANY ORR at SURGERY [facility] ORS   • Cataract extraction with iol     • Pacemaker insertion  March 2017     [employer] RF [id number] implanted by Dr. Jarrell. LV lead unable to be placed.     Family History   Problem Relation Age of Onset   • Diabetes     • Heart Disease      • Hypertension     • Heart Attack Father 65     MI     History   Smoking status   • Former Smoker -- 1 years   • Types: Cigars   • Start date: 01/01/1993   • Quit date: 01/01/1994   Smokeless tobacco   • Former User   • Types: Chew   • Quit date: 01/01/1994     Allergies   Allergen Reactions   • Nkda [No Known Drug Allergy]      Outpatient Encounter Prescriptions as of 7/27/2017   Medication Sig Dispense Refill   • nitroglycerin (NITROSTAT) 0.4 MG SL Tab Place 1 Tab under tongue as needed. 25 Tab 5   • atorvastatin (LIPITOR) 20 MG Tab Take 1 Tab by mouth every day. 30 Tab 11   • lisinopril (PRINIVIL) 2.5 MG Tab Take 2.5 mg by mouth every day.     • glipiZIDE SR (GLUCOTROL) 2.5 MG TABLET SR 24 HR Take 5 mg by mouth every day.     • zolpidem (AMBIEN) 10 MG Tab Take 10 mg by mouth at bedtime as needed for Sleep.     • carvedilol (COREG) 3.125 MG Tab Take 1 Tab by mouth 2 times a day, with meals. 180 Tab 3   • tamsulosin (FLOMAX) 0.4 MG capsule Take 0.4 mg by mouth ONE-HALF HOUR AFTER BREAKFAST.     • furosemide (LASIX) 40 MG Tab Take 40 mg by mouth. 1 TAB IN AM; 1/2 TAB IN AFTERNOON     • aspirin EC (ECOTRIN) 81 MG Tablet Delayed Response Take 81 mg by mouth every day.     • budesonide-formoterol (SYMBICORT) 160-4.5 MCG/ACT AERO Inhale 2 Puffs by mouth 2 Times a Day.     • Multiple Vitamin (MULTI-VITAMIN DAILY PO) Take  by mouth.     • vitamin D (CHOLECALCIFEROL) 1000 UNIT TABS Take 2,000 Units by mouth every day.     • potassium citrate SR (UROCIT-K SR) 10 MEQ (1080 MG) TBCR Take 10 mEq by mouth 2 Times a Day.     • [DISCONTINUED] montelukast (SINGULAIR) 10 MG Tab Take 10 mg by mouth every evening.     • [DISCONTINUED] Saw Palmetto, Serenoa repens, (SAW PALMETTO PO) Take  by mouth.       No facility-administered encounter medications on file as of 7/27/2017.     Review of Systems   HENT: Negative for nosebleeds.    Respiratory: Negative for cough and wheezing.    Cardiovascular: Negative for orthopnea and PND.    "  Musculoskeletal: Negative for myalgias and falls.   Endo/Heme/Allergies: Does not bruise/bleed easily.        Objective:   /64 mmHg  Pulse 82  Ht 1.727 m (5' 8\")  Wt 82.101 kg (181 lb)  BMI 27.53 kg/m2  SpO2 96%    Physical Exam   Constitutional: He is oriented to person, place, and time. He appears well-developed and well-nourished.   Eyes: Conjunctivae are normal. No scleral icterus.   Neck: No JVD present.   Cardiovascular: Normal rate, regular rhythm and intact distal pulses.  Exam reveals no gallop.    No murmur heard.  Pulmonary/Chest: Effort normal and breath sounds normal.   Musculoskeletal: Normal range of motion. He exhibits no edema.   Neurological: He is alert and oriented to person, place, and time.   Skin: Skin is warm and dry.   Psychiatric: He has a normal mood and affect. Thought content normal.       Assessment:     1. Coronary artery disease involving native coronary artery of native heart without angina pectoris     2. Ischemic cardiomyopathy     3. Chronic combined systolic and diastolic CHF (congestive heart failure) (CMS-HCC)     4. Essential hypertension, benign     5. Mixed hyperlipidemia       The above assessed cardiovascular problems are clinically stable.  Medical Decision Making:  Today's Assessment / Status / Plan:     Continue the current cardiovascular regimen.  Continue primary follow up with  Dr. Dixon.   Cardiology follow up in 4 months and  sooner if needed for any change.   CMP, TSH, LP, CBC before next visit.  Use of the emergency medical system reviewed.       No Recipients                "

## 2017-07-27 NOTE — PROGRESS NOTES
Device was implanted on 3/2/2017, and LV lead was able to be implanted due to tortuosity.    Device is working normally. 9 mode switching episodes, all <10 seconds (<1% of total time).  Normal sensing and capture of RA and RV leads; stable impedances. Battery longevity is 8.2-10.2 years.  No changes are made today.    FU in 4 months for next PM check with me, as well as FU with Dr. Hernandez.    Collaborating MD: David

## 2017-08-07 DIAGNOSIS — E78.2 MIXED HYPERLIPIDEMIA: ICD-10-CM

## 2017-10-12 ENCOUNTER — APPOINTMENT (RX ONLY)
Dept: URBAN - METROPOLITAN AREA CLINIC 4 | Facility: CLINIC | Age: 82
Setting detail: DERMATOLOGY
End: 2017-10-12

## 2017-10-12 DIAGNOSIS — D22 MELANOCYTIC NEVI: ICD-10-CM

## 2017-10-12 DIAGNOSIS — D18.0 HEMANGIOMA: ICD-10-CM

## 2017-10-12 DIAGNOSIS — L82.1 OTHER SEBORRHEIC KERATOSIS: ICD-10-CM

## 2017-10-12 DIAGNOSIS — L81.4 OTHER MELANIN HYPERPIGMENTATION: ICD-10-CM

## 2017-10-12 DIAGNOSIS — L57.0 ACTINIC KERATOSIS: ICD-10-CM

## 2017-10-12 DIAGNOSIS — L90.5 SCAR CONDITIONS AND FIBROSIS OF SKIN: ICD-10-CM

## 2017-10-12 PROBLEM — Z85.828 PERSONAL HISTORY OF OTHER MALIGNANT NEOPLASM OF SKIN: Status: ACTIVE | Noted: 2017-10-12

## 2017-10-12 PROBLEM — D22.9 MELANOCYTIC NEVI, UNSPECIFIED: Status: ACTIVE | Noted: 2017-10-12

## 2017-10-12 PROBLEM — D48.5 NEOPLASM OF UNCERTAIN BEHAVIOR OF SKIN: Status: ACTIVE | Noted: 2017-10-12

## 2017-10-12 PROBLEM — D18.01 HEMANGIOMA OF SKIN AND SUBCUTANEOUS TISSUE: Status: ACTIVE | Noted: 2017-10-12

## 2017-10-12 PROCEDURE — ? COUNSELING

## 2017-10-12 PROCEDURE — ? LIQUID NITROGEN

## 2017-10-12 PROCEDURE — 99213 OFFICE O/P EST LOW 20 MIN: CPT | Mod: 25

## 2017-10-12 PROCEDURE — ? DIAGNOSIS COMMENT

## 2017-10-12 PROCEDURE — 17000 DESTRUCT PREMALG LESION: CPT

## 2017-10-12 PROCEDURE — 17003 DESTRUCT PREMALG LES 2-14: CPT

## 2017-10-12 ASSESSMENT — LOCATION DETAILED DESCRIPTION DERM
LOCATION DETAILED: RIGHT SUPERIOR LATERAL MALAR CHEEK
LOCATION DETAILED: RIGHT INFERIOR VERMILION LIP
LOCATION DETAILED: RIGHT SUPERIOR CENTRAL BUCCAL CHEEK
LOCATION DETAILED: LEFT INFERIOR FOREHEAD
LOCATION DETAILED: RIGHT INFERIOR LATERAL NECK
LOCATION DETAILED: LEFT CENTRAL TEMPLE
LOCATION DETAILED: LEFT INFERIOR LATERAL MALAR CHEEK
LOCATION DETAILED: POSTERIOR MID-PARIETAL SCALP
LOCATION DETAILED: RIGHT CLAVICULAR NECK
LOCATION DETAILED: LEFT INFERIOR CENTRAL MALAR CHEEK
LOCATION DETAILED: RIGHT INFERIOR LATERAL MALAR CHEEK
LOCATION DETAILED: LEFT CENTRAL MALAR CHEEK
LOCATION DETAILED: LEFT SUPERIOR HELIX
LOCATION DETAILED: LEFT SUPERIOR LATERAL NECK
LOCATION DETAILED: RIGHT LATERAL MALAR CHEEK
LOCATION DETAILED: RIGHT INFERIOR CENTRAL MALAR CHEEK
LOCATION DETAILED: RIGHT CENTRAL PARIETAL SCALP

## 2017-10-12 ASSESSMENT — LOCATION SIMPLE DESCRIPTION DERM
LOCATION SIMPLE: RIGHT ANTERIOR NECK
LOCATION SIMPLE: POSTERIOR SCALP
LOCATION SIMPLE: RIGHT CHEEK
LOCATION SIMPLE: NECK
LOCATION SIMPLE: LEFT CHEEK
LOCATION SIMPLE: LEFT TEMPLE
LOCATION SIMPLE: LEFT FOREHEAD
LOCATION SIMPLE: LEFT EAR
LOCATION SIMPLE: RIGHT LIP
LOCATION SIMPLE: SCALP

## 2017-10-12 ASSESSMENT — LOCATION ZONE DERM
LOCATION ZONE: NECK
LOCATION ZONE: FACE
LOCATION ZONE: EAR
LOCATION ZONE: LIP
LOCATION ZONE: SCALP

## 2017-11-30 ENCOUNTER — NON-PROVIDER VISIT (OUTPATIENT)
Dept: CARDIOLOGY | Facility: PHYSICIAN GROUP | Age: 82
End: 2017-11-30
Payer: MEDICARE

## 2017-11-30 ENCOUNTER — OFFICE VISIT (OUTPATIENT)
Dept: CARDIOLOGY | Facility: PHYSICIAN GROUP | Age: 82
End: 2017-11-30
Payer: MEDICARE

## 2017-11-30 VITALS
DIASTOLIC BLOOD PRESSURE: 68 MMHG | BODY MASS INDEX: 26.98 KG/M2 | WEIGHT: 178 LBS | OXYGEN SATURATION: 94 % | HEART RATE: 48 BPM | HEIGHT: 68 IN | SYSTOLIC BLOOD PRESSURE: 110 MMHG

## 2017-11-30 DIAGNOSIS — I42.9 CARDIOMYOPATHY, UNSPECIFIED TYPE (HCC): ICD-10-CM

## 2017-11-30 DIAGNOSIS — I10 ESSENTIAL HYPERTENSION, BENIGN: ICD-10-CM

## 2017-11-30 DIAGNOSIS — Z95.0 CARDIAC PACEMAKER IN SITU: ICD-10-CM

## 2017-11-30 DIAGNOSIS — I25.10 CORONARY ARTERY DISEASE INVOLVING NATIVE CORONARY ARTERY OF NATIVE HEART WITHOUT ANGINA PECTORIS: ICD-10-CM

## 2017-11-30 DIAGNOSIS — N18.30 CKD (CHRONIC KIDNEY DISEASE) STAGE 3, GFR 30-59 ML/MIN (HCC): ICD-10-CM

## 2017-11-30 DIAGNOSIS — I50.42 CHRONIC COMBINED SYSTOLIC AND DIASTOLIC CHF (CONGESTIVE HEART FAILURE) (HCC): ICD-10-CM

## 2017-11-30 DIAGNOSIS — I25.5 ISCHEMIC CARDIOMYOPATHY: ICD-10-CM

## 2017-11-30 PROCEDURE — 93288 INTERROG EVL PM/LDLS PM IP: CPT | Performed by: NURSE PRACTITIONER

## 2017-11-30 PROCEDURE — 99213 OFFICE O/P EST LOW 20 MIN: CPT | Performed by: INTERNAL MEDICINE

## 2017-11-30 ASSESSMENT — ENCOUNTER SYMPTOMS
ORTHOPNEA: 0
COUGH: 0
FALLS: 0
PND: 0
WHEEZING: 0
MYALGIAS: 0

## 2017-11-30 NOTE — LETTER
[facility] [address]  Phone: [phone]  Fax: [phone]              Juliocesar Salas  10/25/1934    Encounter Date: 11/30/2017    Osmany Hernandez M.D.          PROGRESS NOTE:  Subjective:   Juliocesar Salas is a 83 y.o. male who presents today For follow-up of his ischemic cardiomyopathy. He has been remarkably free of congestive symptoms and has had no angina nor symptomatic arrhythmia. His pacemaker checks show brief mode switching episodes    Past Medical History:   Diagnosis Date   • Apical mural thrombus following MI (CMS-HCC)    • Arthritis    • Asthma    • Cardiac pacemaker in situ 3/2/2017    Pulse generator is a SJM model [id number], serial # [id number], RA lead: SJM model #2088TC/46 , serial #[id number], RV lead: SJM model #2088TC/52 , serial #[id number], Dr. Jarrell    • Coronary artery disease involving native coronary artery without angina pectoris 11/25/2014    Severe stenosis of LAD and ramus intermedius with moderate proximal RCA and CFX disease and severe distal disease, PCI not successful and CABG declined by Dr. Mustafa and family.   • Diabetes 1988    oral agents   • Hypertension    • Non-STEMI (non-ST elevated myocardial infarction) (CMS-HCC)    • Rectus sheath hematoma 12/3/2014     Past Surgical History:   Procedure Laterality Date   • PACEMAKER INSERTION  March 2017    [employer] RF [id number] implanted by Dr. Jarrell. LV lead unable to be placed.   • HIP ARTHROPLASTY TOTAL  3/5/2012    Performed by OSMANY ORR at SURGERY [facility] ORS   • COLONOSCOPY  2/2012   • HIP ARTHROPLASTY TOTAL  2007    right   • KNEE ARTHROPLASTY TOTAL  2002    bilateral   • INGUINAL HERNIA REPAIR  1986    right x2   • CATARACT EXTRACTION WITH IOL       Family History   Problem Relation Age of Onset   • Heart Attack Father 65     MI   • Diabetes     • Heart Disease     • Hypertension       History   Smoking Status   • Former Smoker   "  • Years: 1.00   • Types: Cigars   • Start date: 1/1/1993   • Quit date: 1/1/1994   Smokeless Tobacco   • Former User   • Types: Chew   • Quit date: 1/1/1994     Allergies   Allergen Reactions   • Nkda [No Known Drug Allergy]      Outpatient Encounter Prescriptions as of 11/30/2017   Medication Sig Dispense Refill   • atorvastatin (LIPITOR) 20 MG Tab Take 1 Tab by mouth every day. 30 Tab 11   • lisinopril (PRINIVIL) 2.5 MG Tab Take 2.5 mg by mouth every day.     • glipiZIDE SR (GLUCOTROL) 2.5 MG TABLET SR 24 HR Take 5 mg by mouth every day.     • zolpidem (AMBIEN) 10 MG Tab Take 10 mg by mouth at bedtime as needed for Sleep.     • carvedilol (COREG) 3.125 MG Tab Take 1 Tab by mouth 2 times a day, with meals. 180 Tab 3   • tamsulosin (FLOMAX) 0.4 MG capsule Take 0.4 mg by mouth ONE-HALF HOUR AFTER BREAKFAST.     • furosemide (LASIX) 40 MG Tab Take 40 mg by mouth. 1 TAB IN AM; 1/2 TAB IN AFTERNOON     • aspirin EC (ECOTRIN) 81 MG Tablet Delayed Response Take 81 mg by mouth every day.     • budesonide-formoterol (SYMBICORT) 160-4.5 MCG/ACT AERO Inhale 2 Puffs by mouth 2 Times a Day.     • Multiple Vitamin (MULTI-VITAMIN DAILY PO) Take  by mouth.     • vitamin D (CHOLECALCIFEROL) 1000 UNIT TABS Take 2,000 Units by mouth every day.     • potassium citrate SR (UROCIT-K SR) 10 MEQ (1080 MG) TBCR Take 10 mEq by mouth 2 Times a Day.     • nitroglycerin (NITROSTAT) 0.4 MG SL Tab Place 1 Tab under tongue as needed. 25 Tab 5     No facility-administered encounter medications on file as of 11/30/2017.      Review of Systems   Respiratory: Negative for cough and wheezing.    Cardiovascular: Negative for orthopnea and PND.   Musculoskeletal: Negative for falls and myalgias.        Objective:   /68   Pulse (!) 48   Ht 1.727 m (5' 8\")   Wt 80.7 kg (178 lb)   SpO2 94%   BMI 27.06 kg/m²      Physical Exam   Constitutional: He is oriented to person, place, and time. He appears well-developed and well-nourished.   Eyes: " Conjunctivae are normal. No scleral icterus.   Neck: No JVD present.   Cardiovascular: Normal rate, regular rhythm and intact distal pulses.  Exam reveals no gallop.    No murmur heard.  Pulmonary/Chest: Effort normal and breath sounds normal.   Musculoskeletal: Normal range of motion. He exhibits no edema.   Neurological: He is alert and oriented to person, place, and time.   Skin: Skin is warm and dry.   Psychiatric: He has a normal mood and affect. Thought content normal.       Assessment:     1. Ischemic cardiomyopathy     2. Coronary artery disease involving native coronary artery of native heart without angina pectoris     3. Essential hypertension, benign     4. CKD (chronic kidney disease) stage 3, GFR 30-59 ml/min       Clinically he is doing very well with the 2 loose ends are whether or not he having emergence of atrial fibrillation based on the mode switching episodes, although they remain very brief, and the status of his left ventricular dysfunction. Recall that a left ventricular lead could not be placed because of his venous configuration and that he does not have a defibrillator lead after electrophysiology consultation earlier this year.  Medical Decision Making:  Today's Assessment / Status / Plan:     Continue the current cardiovascular regimen.  Continue primary follow up with  Dr. Dixon and nephrology f/u with Dr. Goode.   Cardiology follow up in 3 months with pacer check for rhythm assessment and  sooner if needed for any change.   Lab before next visit per Dr. Goode.  Use of the emergency medical system reviewed.       Rebecca Dixon M.D.  [address] [facility] [address]  VIA Facsimile: [phone]     Tabitha Goode M.D.  [address]  [facility] [address]  VIA Facsimile: [phone]

## 2017-11-30 NOTE — PROGRESS NOTES
Patient had CRT-P implanted in March 2017, but LV lead was unable to be implanted due to tortuosity.    Device is working normally. Mode switching episodes <1% of total time: longest episode 5+ minutes in September 2017. All other episode <10 seconds.  Normal sensing and capture of RA and RV leads; stable impedances. Battery longevity is 8.1-9.2 years.  No changes are made today.    FU in 6 months for next PM check with me.     upgrade is done tdoay too.    Collaborating MD: David

## 2017-11-30 NOTE — PROGRESS NOTES
Subjective:   Juliocesar Salas is a 83 y.o. male who presents today For follow-up of his ischemic cardiomyopathy. He has been remarkably free of congestive symptoms and has had no angina nor symptomatic arrhythmia. His pacemaker checks show brief mode switching episodes    Past Medical History:   Diagnosis Date   • Apical mural thrombus following MI (CMS-[facility])    • Arthritis    • Asthma    • Cardiac pacemaker in situ 3/2/2017    Pulse generator is a SJM model [id number], serial # [id number], RA lead: SJM model #2088TC/46 , serial #[id number], RV lead: SJM model #2088TC/52 , serial #[id number], Dr. Jarrell    • Coronary artery disease involving native coronary artery without angina pectoris 11/25/2014    Severe stenosis of LAD and ramus intermedius with moderate proximal RCA and CFX disease and severe distal disease, PCI not successful and CABG declined by Dr. Mustafa and family.   • Diabetes 1988    oral agents   • Hypertension    • Non-STEMI (non-ST elevated myocardial infarction) (CMS-HCC)    • Rectus sheath hematoma 12/3/2014     Past Surgical History:   Procedure Laterality Date   • PACEMAKER INSERTION  March 2017    [employer] RF [id number] implanted by Dr. Jarrell. LV lead unable to be placed.   • HIP ARTHROPLASTY TOTAL  3/5/2012    Performed by OSMANY ORR at SURGERY [facility]   • COLONOSCOPY  2/2012   • HIP ARTHROPLASTY TOTAL  2007    right   • KNEE ARTHROPLASTY TOTAL  2002    bilateral   • INGUINAL HERNIA REPAIR  1986    right x2   • CATARACT EXTRACTION WITH IOL       Family History   Problem Relation Age of Onset   • Heart Attack Father 65     MI   • Diabetes     • Heart Disease     • Hypertension       History   Smoking Status   • Former Smoker   • Years: 1.00   • Types: Cigars   • Start date: 1/1/1993   • Quit date: 1/1/1994   Smokeless Tobacco   • Former User   • Types: Chew   • Quit date: 1/1/1994     Allergies   Allergen Reactions   • Nkda [No Known Drug Allergy]      Outpatient Encounter Prescriptions as  "of 11/30/2017   Medication Sig Dispense Refill   • atorvastatin (LIPITOR) 20 MG Tab Take 1 Tab by mouth every day. 30 Tab 11   • lisinopril (PRINIVIL) 2.5 MG Tab Take 2.5 mg by mouth every day.     • glipiZIDE SR (GLUCOTROL) 2.5 MG TABLET SR 24 HR Take 5 mg by mouth every day.     • zolpidem (AMBIEN) 10 MG Tab Take 10 mg by mouth at bedtime as needed for Sleep.     • carvedilol (COREG) 3.125 MG Tab Take 1 Tab by mouth 2 times a day, with meals. 180 Tab 3   • tamsulosin (FLOMAX) 0.4 MG capsule Take 0.4 mg by mouth ONE-HALF HOUR AFTER BREAKFAST.     • furosemide (LASIX) 40 MG Tab Take 40 mg by mouth. 1 TAB IN AM; 1/2 TAB IN AFTERNOON     • aspirin EC (ECOTRIN) 81 MG Tablet Delayed Response Take 81 mg by mouth every day.     • budesonide-formoterol (SYMBICORT) 160-4.5 MCG/ACT AERO Inhale 2 Puffs by mouth 2 Times a Day.     • Multiple Vitamin (MULTI-VITAMIN DAILY PO) Take  by mouth.     • vitamin D (CHOLECALCIFEROL) 1000 UNIT TABS Take 2,000 Units by mouth every day.     • potassium citrate SR (UROCIT-K SR) 10 MEQ (1080 MG) TBCR Take 10 mEq by mouth 2 Times a Day.     • nitroglycerin (NITROSTAT) 0.4 MG SL Tab Place 1 Tab under tongue as needed. 25 Tab 5     No facility-administered encounter medications on file as of 11/30/2017.      Review of Systems   Respiratory: Negative for cough and wheezing.    Cardiovascular: Negative for orthopnea and PND.   Musculoskeletal: Negative for falls and myalgias.        Objective:   /68   Pulse (!) 48   Ht 1.727 m (5' 8\")   Wt 80.7 kg (178 lb)   SpO2 94%   BMI 27.06 kg/m²     Physical Exam   Constitutional: He is oriented to person, place, and time. He appears well-developed and well-nourished.   Eyes: Conjunctivae are normal. No scleral icterus.   Neck: No JVD present.   Cardiovascular: Normal rate, regular rhythm and intact distal pulses.  Exam reveals no gallop.    No murmur heard.  Pulmonary/Chest: Effort normal and breath sounds normal.   Musculoskeletal: Normal range " of motion. He exhibits no edema.   Neurological: He is alert and oriented to person, place, and time.   Skin: Skin is warm and dry.   Psychiatric: He has a normal mood and affect. Thought content normal.       Assessment:     1. Ischemic cardiomyopathy     2. Coronary artery disease involving native coronary artery of native heart without angina pectoris     3. Essential hypertension, benign     4. CKD (chronic kidney disease) stage 3, GFR 30-59 ml/min       Clinically he is doing very well with the 2 loose ends are whether or not he having emergence of atrial fibrillation based on the mode switching episodes, although they remain very brief, and the status of his left ventricular dysfunction. Recall that a left ventricular lead could not be placed because of his venous configuration and that he does not have a defibrillator lead after electrophysiology consultation earlier this year.  Medical Decision Making:  Today's Assessment / Status / Plan:     Continue the current cardiovascular regimen.  Continue primary follow up with  Dr. Dixon and nephrology f/u with Dr. Goode.   Cardiology follow up in 3 months with pacer check for rhythm assessment and  sooner if needed for any change.   Lab before next visit per Dr. Goode.  Use of the emergency medical system reviewed.

## 2018-02-01 ENCOUNTER — APPOINTMENT (RX ONLY)
Dept: URBAN - METROPOLITAN AREA CLINIC 4 | Facility: CLINIC | Age: 83
Setting detail: DERMATOLOGY
End: 2018-02-01

## 2018-02-01 DIAGNOSIS — L81.4 OTHER MELANIN HYPERPIGMENTATION: ICD-10-CM

## 2018-02-01 DIAGNOSIS — L57.0 ACTINIC KERATOSIS: ICD-10-CM

## 2018-02-01 DIAGNOSIS — L82.1 OTHER SEBORRHEIC KERATOSIS: ICD-10-CM

## 2018-02-01 DIAGNOSIS — D22 MELANOCYTIC NEVI: ICD-10-CM

## 2018-02-01 DIAGNOSIS — L90.5 SCAR CONDITIONS AND FIBROSIS OF SKIN: ICD-10-CM

## 2018-02-01 PROBLEM — D22.5 MELANOCYTIC NEVI OF TRUNK: Status: ACTIVE | Noted: 2018-02-01

## 2018-02-01 PROCEDURE — 17003 DESTRUCT PREMALG LES 2-14: CPT

## 2018-02-01 PROCEDURE — ? LIQUID NITROGEN

## 2018-02-01 PROCEDURE — 17000 DESTRUCT PREMALG LESION: CPT

## 2018-02-01 PROCEDURE — 99212 OFFICE O/P EST SF 10 MIN: CPT | Mod: 25

## 2018-02-01 PROCEDURE — ? DIAGNOSIS COMMENT

## 2018-02-01 PROCEDURE — ? COUNSELING

## 2018-02-01 ASSESSMENT — LOCATION SIMPLE DESCRIPTION DERM
LOCATION SIMPLE: ABDOMEN
LOCATION SIMPLE: RIGHT FOREARM
LOCATION SIMPLE: SCALP
LOCATION SIMPLE: LEFT LIP
LOCATION SIMPLE: RIGHT OCCIPITAL SCALP
LOCATION SIMPLE: LEFT HAND
LOCATION SIMPLE: RIGHT UPPER BACK
LOCATION SIMPLE: RIGHT CHEEK

## 2018-02-01 ASSESSMENT — LOCATION DETAILED DESCRIPTION DERM
LOCATION DETAILED: LEFT ULNAR DORSAL HAND
LOCATION DETAILED: RIGHT LATERAL BUCCAL CHEEK
LOCATION DETAILED: EPIGASTRIC SKIN
LOCATION DETAILED: RIGHT DISTAL DORSAL FOREARM
LOCATION DETAILED: LEFT INFERIOR VERMILION LIP
LOCATION DETAILED: RIGHT MEDIAL UPPER BACK
LOCATION DETAILED: RIGHT SUPERIOR OCCIPITAL SCALP
LOCATION DETAILED: LEFT CENTRAL PARIETAL SCALP

## 2018-02-01 ASSESSMENT — LOCATION ZONE DERM
LOCATION ZONE: HAND
LOCATION ZONE: FACE
LOCATION ZONE: LIP
LOCATION ZONE: TRUNK
LOCATION ZONE: ARM
LOCATION ZONE: SCALP

## 2018-03-08 ENCOUNTER — NON-PROVIDER VISIT (OUTPATIENT)
Dept: CARDIOLOGY | Facility: PHYSICIAN GROUP | Age: 83
End: 2018-03-08
Payer: MEDICARE

## 2018-03-08 ENCOUNTER — TELEPHONE (OUTPATIENT)
Dept: CARDIOLOGY | Facility: MEDICAL CENTER | Age: 83
End: 2018-03-08

## 2018-03-08 ENCOUNTER — OFFICE VISIT (OUTPATIENT)
Dept: CARDIOLOGY | Facility: PHYSICIAN GROUP | Age: 83
End: 2018-03-08
Payer: MEDICARE

## 2018-03-08 VITALS
BODY MASS INDEX: 29.1 KG/M2 | SYSTOLIC BLOOD PRESSURE: 126 MMHG | HEART RATE: 84 BPM | OXYGEN SATURATION: 96 % | DIASTOLIC BLOOD PRESSURE: 84 MMHG | WEIGHT: 192 LBS | HEIGHT: 68 IN

## 2018-03-08 DIAGNOSIS — I42.9 CARDIOMYOPATHY, UNSPECIFIED TYPE (HCC): ICD-10-CM

## 2018-03-08 DIAGNOSIS — I25.10 CORONARY ARTERY DISEASE INVOLVING NATIVE CORONARY ARTERY OF NATIVE HEART WITHOUT ANGINA PECTORIS: ICD-10-CM

## 2018-03-08 DIAGNOSIS — I25.5 ISCHEMIC CARDIOMYOPATHY: ICD-10-CM

## 2018-03-08 DIAGNOSIS — I50.42 CHRONIC COMBINED SYSTOLIC AND DIASTOLIC CHF (CONGESTIVE HEART FAILURE) (HCC): ICD-10-CM

## 2018-03-08 DIAGNOSIS — I10 ESSENTIAL HYPERTENSION, BENIGN: ICD-10-CM

## 2018-03-08 DIAGNOSIS — R60.0 LOCALIZED EDEMA: ICD-10-CM

## 2018-03-08 DIAGNOSIS — Z95.0 CARDIAC PACEMAKER IN SITU: ICD-10-CM

## 2018-03-08 DIAGNOSIS — E78.2 MIXED HYPERLIPIDEMIA: ICD-10-CM

## 2018-03-08 DIAGNOSIS — R06.02 SHORTNESS OF BREATH: ICD-10-CM

## 2018-03-08 PROBLEM — R60.9 EDEMA: Status: ACTIVE | Noted: 2018-03-08

## 2018-03-08 PROCEDURE — 99214 OFFICE O/P EST MOD 30 MIN: CPT | Performed by: NURSE PRACTITIONER

## 2018-03-08 PROCEDURE — 93288 INTERROG EVL PM/LDLS PM IP: CPT | Performed by: NURSE PRACTITIONER

## 2018-03-08 ASSESSMENT — ENCOUNTER SYMPTOMS
PND: 0
LOSS OF CONSCIOUSNESS: 0
FEVER: 0
HEADACHES: 0
PALPITATIONS: 0
CHILLS: 0
BRUISES/BLEEDS EASILY: 0
MYALGIAS: 0
ORTHOPNEA: 1
ABDOMINAL PAIN: 0
SHORTNESS OF BREATH: 1
DIZZINESS: 0

## 2018-03-08 NOTE — TELEPHONE ENCOUNTER
3/8/18 I phoned Elif in [place] to obtain labs for Dr. Lincoln for 3/9/18 visit.  She stated patient was just at office and was on way to get labs done.  Elif found recent January 2018 labs and will print those out for Dr. Lincoln, as well as, obtain today's labs in a.m.  Tami

## 2018-03-08 NOTE — LETTER
[facility] [address]  Phone: [phone]  Fax: [phone]              Juliocesar Salas  10/25/1934    Encounter Date: 3/8/2018    LORNE Astorga          PROGRESS NOTE:  Subjective:   Juliocesar Salas is a 83 y.o. male who presents today for follow-up of shortness of breath.    Chief Complaint: shortness of breath and edema    Juliocesar is an 81 year old male with history of CAD/ischemic cardiomyopathy with CRT-P in March 2017 (with LV lead unable to be implanted due to tortuosity), hypertension, hyperlipidemia, and diabetes.     In the last few weeks, he has noticed increased shortness of breath and edema; he is also unable to lie flat. No chest pain, pressure or discomfort; no palpitations; no dizziness or syncope.     Past Medical History:   Diagnosis Date   • Apical mural thrombus following MI (CMS-HCC)    • Arthritis    • Asthma    • Cardiac pacemaker in situ 3/2/2017    Pulse generator is a [employer] model [id number], serial # [id number], RA lead: SJM model #2088TC/46 , serial #[id number], RV lead: SJM model #2088TC/52 , serial #[id number], Dr. Jarrell    • Coronary artery disease involving native coronary artery without angina pectoris 11/25/2014    Severe stenosis of LAD and ramus intermedius with moderate proximal RCA and CFX disease and severe distal disease, PCI not successful and CABG declined by Dr. Mustafa and family.   • Diabetes 1988    oral agents   • Hypertension    • Non-STEMI (non-ST elevated myocardial infarction) (CMS-HCC)    • Rectus sheath hematoma 12/3/2014     Past Surgical History:   Procedure Laterality Date   • PACEMAKER INSERTION  March 2017    [employer] RF [id number] implanted by Dr. Jarrell. LV lead unable to be placed.   • HIP ARTHROPLASTY TOTAL  3/5/2012    Performed by O[facility] ORR at [facility] ORS   • COLONOSCOPY  2/2012   • HIP ARTHROPLASTY TOTAL  2007    right   • KNEE ARTHROPLASTY TOTAL  2002    bilateral   • INGUINAL HERNIA REPAIR  1986    right x2   • CATARACT EXTRACTION WITH IOL       Family History   Problem Relation Age of Onset   • Heart Attack Father 65     MI   • Diabetes     • Heart Disease     • Hypertension       History   Smoking Status   • Former Smoker   • Years: 1.00   • Types: Cigars   • Start date: 1/1/1993   • Quit date: 1/1/1994   Smokeless Tobacco   • Former User   • Types: Chew   • Quit date: 1/1/1994     Allergies   Allergen Reactions   • Nkda [No Known Drug Allergy]      Outpatient Encounter Prescriptions as of 3/8/2018   Medication Sig Dispense Refill   • nitroglycerin (NITROSTAT) 0.4 MG SL Tab Place 1 Tab under tongue as needed. 25 Tab 5   • atorvastatin (LIPITOR) 20 MG Tab Take 1 Tab by mouth every day. 30 Tab 11   • lisinopril (PRINIVIL) 2.5 MG Tab Take 2.5 mg by mouth every day.     • glipiZIDE SR (GLUCOTROL) 2.5 MG TABLET SR 24 HR Take 5 mg by mouth every day.     • zolpidem (AMBIEN) 10 MG Tab Take 10 mg by mouth at bedtime as needed for Sleep.     • carvedilol (COREG) 3.125 MG Tab Take 1 Tab by mouth 2 times a day, with meals. 180 Tab 3   • tamsulosin (FLOMAX) 0.4 MG capsule Take 0.4 mg by mouth ONE-HALF HOUR AFTER BREAKFAST.     • furosemide (LASIX) 40 MG Tab Take 40 mg by mouth. 1 TAB IN AM; 1/2 TAB IN AFTERNOON     • aspirin EC (ECOTRIN) 81 MG Tablet Delayed Response Take 81 mg by mouth every day.     • budesonide-formoterol (SYMBICORT) 160-4.5 MCG/ACT AERO Inhale 2 Puffs by mouth 2 Times a Day.     • Multiple Vitamin (MULTI-VITAMIN DAILY PO) Take  by mouth.     • vitamin D (CHOLECALCIFEROL) 1000 UNIT TABS Take 2,000 Units by mouth every day.     • potassium citrate SR (UROCIT-K SR) 10 MEQ (1080 MG) TBCR Take 10 mEq by mouth 2 Times a Day.       No facility-administered encounter medications on file as of 3/8/2018.      Review of Systems   Constitutional: Negative for chills and fever.   Respiratory: Positive for shortness of breath.    Cardiovascular: Positive for orthopnea  and leg swelling. Negative for chest pain, palpitations and PND.   Gastrointestinal: Negative for abdominal pain.   Musculoskeletal: Negative for myalgias.   Neurological: Negative for dizziness, loss of consciousness and headaches.   Endo/Heme/Allergies: Does not bruise/bleed easily.        Objective:   There were no vitals taken for this visit.    Physical Exam   Constitutional: He is oriented to person, place, and time. He appears well-developed and well-nourished. No distress.   HENT:   Head: Normocephalic.   Eyes: Conjunctivae and EOM are normal.   Neck: Normal range of motion. Neck supple. JVD present.   Cardiovascular: Normal rate, regular rhythm, normal heart sounds and intact distal pulses.  Exam reveals no gallop and no friction rub.    No murmur heard.  Pulmonary/Chest: Effort normal and breath sounds normal.   Abdominal: Soft. Bowel sounds are normal.   Musculoskeletal: Normal range of motion. He exhibits edema.   1-2+ edema to the shins bilaterally.   Neurological: He is alert and oriented to person, place, and time.   Skin: Skin is warm and dry.   Psychiatric: He has a normal mood and affect. His behavior is normal.     PM is working normally - very minimal mode switching episodes.    No recent labwork done.    Assessment:     1. Localized edema  BASIC METABOLIC PANEL   2. Shortness of breath  BASIC METABOLIC PANEL   3. Cardiac pacemaker in situ     4. Cardiomyopathy, unspecified type (CMS-HCC)     5. Chronic combined systolic and diastolic CHF (congestive heart failure) (CMS-HCC)     6. Coronary artery disease involving native coronary artery of native heart without angina pectoris     7. Ischemic cardiomyopathy     8. Essential hypertension, benign     9. Mixed hyperlipidemia         Medical Decision Making:  Today's Assessment / Status / Plan:     1. Edema and shortness of breath, to increase Lasix to 80mg x 2 days, then 40mg once daily. BMP today and next week as well. FU with me in 2 weeks.    2.  Cardiomyopathy and CHF, with history of PM, which is working normally.    3. CAD/ischemic cardiomyopathy, LVEF 30% on echo in February 2017. Will consider repeating at follow-up.    4. Hypertension, treated and stable.    5. Hyperlipidemia, treated.    Plan as above: increase Lasix, BMP today and next week. FU with me in 2 weeks.    Collaborating MD: Paul Snow

## 2018-03-08 NOTE — PROGRESS NOTES
Subjective:   Juliocesar Salas is a 83 y.o. male who presents today for follow-up of shortness of breath.    Chief Complaint: shortness of breath and edema    Juliocesar is an 81 year old male with history of CAD/ischemic cardiomyopathy with CRT-P in March 2017 (with LV lead unable to be implanted due to tortuosity), hypertension, hyperlipidemia, and diabetes.     In the last few weeks, he has noticed increased shortness of breath and edema; he is also unable to lie flat. No chest pain, pressure or discomfort; no palpitations; no dizziness or syncope.     Past Medical History:   Diagnosis Date   • Apical mural thrombus following MI (CMS-HCC)    • Arthritis    • Asthma    • Cardiac pacemaker in situ 3/2/2017    Pulse generator is a [employer] model [id number], serial # [id number], RA lead: SJM model #2088TC/46 , serial #[id number], RV lead: SJM model #2088TC/52 , serial #[id number], Dr. Jarrell    • Coronary artery disease involving native coronary artery without angina pectoris 11/25/2014    Severe stenosis of LAD and ramus intermedius with moderate proximal RCA and CFX disease and severe distal disease, PCI not successful and CABG declined by Dr. Mustafa and family.   • Diabetes 1988    oral agents   • Hypertension    • Non-STEMI (non-ST elevated myocardial infarction) (CMS-HCC)    • Rectus sheath hematoma 12/3/2014     Past Surgical History:   Procedure Laterality Date   • PACEMAKER INSERTION  March 2017    [employer] RF [id number] implanted by Dr. Jarrell. LV lead unable to be placed.   • HIP ARTHROPLASTY TOTAL  3/5/2012    Performed by [facility] ORR at SURGERY [facility] ORS   • COLONOSCOPY  2/2012   • HIP ARTHROPLASTY TOTAL  2007    right   • KNEE ARTHROPLASTY TOTAL  2002    bilateral   • INGUINAL HERNIA REPAIR  1986    right x2   • CATARACT EXTRACTION WITH IOL       Family History   Problem Relation Age of Onset   • Heart Attack Father 65     MI   • Diabetes     • Heart Disease     • Hypertension       History   Smoking Status   •  Former Smoker   • Years: 1.00   • Types: Cigars   • Start date: 1/1/1993   • Quit date: 1/1/1994   Smokeless Tobacco   • Former User   • Types: Chew   • Quit date: 1/1/1994     Allergies   Allergen Reactions   • Nkda [No Known Drug Allergy]      Outpatient Encounter Prescriptions as of 3/8/2018   Medication Sig Dispense Refill   • nitroglycerin (NITROSTAT) 0.4 MG SL Tab Place 1 Tab under tongue as needed. 25 Tab 5   • atorvastatin (LIPITOR) 20 MG Tab Take 1 Tab by mouth every day. 30 Tab 11   • lisinopril (PRINIVIL) 2.5 MG Tab Take 2.5 mg by mouth every day.     • glipiZIDE SR (GLUCOTROL) 2.5 MG TABLET SR 24 HR Take 5 mg by mouth every day.     • zolpidem (AMBIEN) 10 MG Tab Take 10 mg by mouth at bedtime as needed for Sleep.     • carvedilol (COREG) 3.125 MG Tab Take 1 Tab by mouth 2 times a day, with meals. 180 Tab 3   • tamsulosin (FLOMAX) 0.4 MG capsule Take 0.4 mg by mouth ONE-HALF HOUR AFTER BREAKFAST.     • furosemide (LASIX) 40 MG Tab Take 40 mg by mouth. 1 TAB IN AM; 1/2 TAB IN AFTERNOON     • aspirin EC (ECOTRIN) 81 MG Tablet Delayed Response Take 81 mg by mouth every day.     • budesonide-formoterol (SYMBICORT) 160-4.5 MCG/ACT AERO Inhale 2 Puffs by mouth 2 Times a Day.     • Multiple Vitamin (MULTI-VITAMIN DAILY PO) Take  by mouth.     • vitamin D (CHOLECALCIFEROL) 1000 UNIT TABS Take 2,000 Units by mouth every day.     • potassium citrate SR (UROCIT-K SR) 10 MEQ (1080 MG) TBCR Take 10 mEq by mouth 2 Times a Day.       No facility-administered encounter medications on file as of 3/8/2018.      Review of Systems   Constitutional: Negative for chills and fever.   Respiratory: Positive for shortness of breath.    Cardiovascular: Positive for orthopnea and leg swelling. Negative for chest pain, palpitations and PND.   Gastrointestinal: Negative for abdominal pain.   Musculoskeletal: Negative for myalgias.   Neurological: Negative for dizziness, loss of consciousness and headaches.   Endo/Heme/Allergies: Does  not bruise/bleed easily.        Objective:   There were no vitals taken for this visit.    Physical Exam   Constitutional: He is oriented to person, place, and time. He appears well-developed and well-nourished. No distress.   HENT:   Head: Normocephalic.   Eyes: Conjunctivae and EOM are normal.   Neck: Normal range of motion. Neck supple. JVD present.   Cardiovascular: Normal rate, regular rhythm, normal heart sounds and intact distal pulses.  Exam reveals no gallop and no friction rub.    No murmur heard.  Pulmonary/Chest: Effort normal and breath sounds normal.   Abdominal: Soft. Bowel sounds are normal.   Musculoskeletal: Normal range of motion. He exhibits edema.   1-2+ edema to the shins bilaterally.   Neurological: He is alert and oriented to person, place, and time.   Skin: Skin is warm and dry.   Psychiatric: He has a normal mood and affect. His behavior is normal.     PM is working normally - very minimal mode switching episodes.    LABS AS OF 1/11/2018:  Glucose 147  BUN 27  Creatinine 1.4  GFR 48  Potassium 4.2  AST 28  ALT 43    Assessment:     1. Localized edema  BASIC METABOLIC PANEL   2. Shortness of breath  BASIC METABOLIC PANEL   3. Cardiac pacemaker in situ     4. Cardiomyopathy, unspecified type (CMS-HCC)     5. Chronic combined systolic and diastolic CHF (congestive heart failure) (CMS-HCC)     6. Coronary artery disease involving native coronary artery of native heart without angina pectoris     7. Ischemic cardiomyopathy     8. Essential hypertension, benign     9. Mixed hyperlipidemia         Medical Decision Making:  Today's Assessment / Status / Plan:     1. Edema and shortness of breath, to increase Lasix to 80mg x 2 days, then 40mg once daily. BMP today and next week as well. FU with me in 2 weeks.    2. Cardiomyopathy and CHF, with history of PM, which is working normally.    3. CAD/ischemic cardiomyopathy, LVEF 30% on echo in February 2017. Will consider repeating at follow-up.    4.  Hypertension, treated and stable.    5. Hyperlipidemia, treated.    Plan as above: increase Lasix, BMP today and next week. FU with me in 2 weeks.    Collaborating MD: Paul

## 2018-03-09 ENCOUNTER — OFFICE VISIT (OUTPATIENT)
Dept: CARDIOLOGY | Facility: PHYSICIAN GROUP | Age: 83
End: 2018-03-09
Payer: MEDICARE

## 2018-03-09 VITALS
WEIGHT: 190 LBS | SYSTOLIC BLOOD PRESSURE: 122 MMHG | HEIGHT: 68 IN | DIASTOLIC BLOOD PRESSURE: 86 MMHG | HEART RATE: 92 BPM | BODY MASS INDEX: 28.79 KG/M2 | OXYGEN SATURATION: 91 %

## 2018-03-09 DIAGNOSIS — I51.3 APICAL MURAL THROMBUS: ICD-10-CM

## 2018-03-09 DIAGNOSIS — I50.42 CHRONIC COMBINED SYSTOLIC AND DIASTOLIC CHF (CONGESTIVE HEART FAILURE) (HCC): ICD-10-CM

## 2018-03-09 DIAGNOSIS — I25.5 ISCHEMIC CARDIOMYOPATHY: ICD-10-CM

## 2018-03-09 DIAGNOSIS — I25.10 CORONARY ARTERY DISEASE INVOLVING NATIVE CORONARY ARTERY OF NATIVE HEART WITHOUT ANGINA PECTORIS: ICD-10-CM

## 2018-03-09 DIAGNOSIS — I42.9 CARDIOMYOPATHY, UNSPECIFIED TYPE (HCC): ICD-10-CM

## 2018-03-09 DIAGNOSIS — Z95.0 CARDIAC PACEMAKER IN SITU: ICD-10-CM

## 2018-03-09 PROCEDURE — 99214 OFFICE O/P EST MOD 30 MIN: CPT | Performed by: INTERNAL MEDICINE

## 2018-03-09 NOTE — PROGRESS NOTES
Subjective:   Juliocesar Salas is a 83 y.o. male who presents today     In [place]  New to me - [facility] lasix for his ICM - thrombus and MI    Wife working hard to keep him healthy  Stressful  A bit better    Past Medical History:   Diagnosis Date   • Apical mural thrombus following MI (CMS-HCC)    • Arthritis    • Asthma    • Cardiac pacemaker in situ 3/2/2017    Pulse generator is a [employer] model [id number], serial # [id number], RA lead: SJM model #2088TC/46 , serial #[id number], RV lead: SJM model #2088TC/52 , serial #[id number], Dr. Jarrell    • Coronary artery disease involving native coronary artery without angina pectoris 11/25/2014    Severe stenosis of LAD and ramus intermedius with moderate proximal RCA and CFX disease and severe distal disease, PCI not successful and CABG declined by Dr. Mustafa and family.   • Diabetes 1988    oral agents   • Hypertension    • Non-STEMI (non-ST elevated myocardial infarction) (CMS-HCC)    • Rectus sheath hematoma 12/3/2014     Past Surgical History:   Procedure Laterality Date   • PACEMAKER INSERTION  March 2017    [employer] RF [id number] implanted by Dr. Jarrell. LV lead unable to be placed.   • HIP ARTHROPLASTY TOTAL  3/5/2012    Performed by [facility] ORR at [facility] ORS   • COLONOSCOPY  2/2012   • HIP ARTHROPLASTY TOTAL  2007    right   • KNEE ARTHROPLASTY TOTAL  2002    bilateral   • INGUINAL HERNIA REPAIR  1986    right x2   • CATARACT EXTRACTION WITH IOL       Family History   Problem Relation Age of Onset   • Heart Attack Father 65     MI   • Diabetes     • Heart Disease     • Hypertension       History   Smoking Status   • Former Smoker   • Years: 1.00   • Types: Cigars   • Start date: 1/1/1993   • Quit date: 1/1/1994   Smokeless Tobacco   • Former User   • Types: Chew   • Quit date: 1/1/1994     Allergies   Allergen Reactions   • Nkda [No Known Drug Allergy]      Outpatient Encounter Prescriptions as of 3/9/2018   Medication Sig Dispense Refill   • nitroglycerin  "(NITROSTAT) 0.4 MG SL Tab Place 1 Tab under tongue as needed. 25 Tab 5   • atorvastatin (LIPITOR) 20 MG Tab Take 1 Tab by mouth every day. 30 Tab 11   • lisinopril (PRINIVIL) 2.5 MG Tab Take 2.5 mg by mouth every day.     • glipiZIDE SR (GLUCOTROL) 2.5 MG TABLET SR 24 HR Take 5 mg by mouth every day.     • zolpidem (AMBIEN) 10 MG Tab Take 10 mg by mouth at bedtime as needed for Sleep.     • carvedilol (COREG) 3.125 MG Tab Take 1 Tab by mouth 2 times a day, with meals. 180 Tab 3   • tamsulosin (FLOMAX) 0.4 MG capsule Take 0.4 mg by mouth ONE-HALF HOUR AFTER BREAKFAST.     • furosemide (LASIX) 40 MG Tab Take 40 mg by mouth. 1 TAB IN AM; 1/2 TAB IN AFTERNOON     • aspirin EC (ECOTRIN) 81 MG Tablet Delayed Response Take 81 mg by mouth every day.     • budesonide-formoterol (SYMBICORT) 160-4.5 MCG/ACT AERO Inhale 2 Puffs by mouth 2 Times a Day.     • Multiple Vitamin (MULTI-VITAMIN DAILY PO) Take  by mouth.     • vitamin D (CHOLECALCIFEROL) 1000 UNIT TABS Take 2,000 Units by mouth every day.     • potassium citrate SR (UROCIT-K SR) 10 MEQ (1080 MG) TBCR Take 10 mEq by mouth 2 Times a Day.       No facility-administered encounter medications on file as of 3/9/2018.      Review of Systems   Unable to perform ROS: Other   Constitutional:        Hard of hearing        Objective:   /86   Pulse 92   Ht 1.727 m (5' 8\")   Wt 86.2 kg (190 lb)   SpO2 91%   BMI 28.89 kg/m²     Physical Exam   Constitutional:   Elderly in wheelchair   HENT:   Head: Normocephalic and atraumatic.   Eyes: EOM are normal. Pupils are equal, round, and reactive to light. No scleral icterus.   Neck: JVD present. No thyromegaly present.   Cardiovascular: Normal rate and intact distal pulses.    PM in chest   Pulmonary/Chest: Breath sounds normal. No respiratory distress. He exhibits no tenderness.   Abdominal: Bowel sounds are normal.   Musculoskeletal: He exhibits edema (1+ bilat).   Lymphadenopathy:     He has no cervical adenopathy. "   Neurological: He exhibits normal muscle tone. Coordination normal.   Skin: Skin is dry. No rash noted.       Assessment:     1. Apical mural thrombus     2. Cardiac pacemaker in situ     3. Cardiomyopathy, unspecified type (CMS-HCC)     4. Chronic combined systolic and diastolic CHF (congestive heart failure) (CMS-HCC)     5. Coronary artery disease involving native coronary artery of native heart without angina pectoris     6. Ischemic cardiomyopathy         Medical Decision Making:  Today's Assessment / Status / Plan:       Extensive chart reviewed    CHF, acute on chronic  Ef 30 on 2017 echo  duiresis  End of life care addressed  meds as is   No thrombus noted  ?CRT  Has f/u with Ernestine RUBALCAVA in 2w - labs today good

## 2018-03-09 NOTE — LETTER
[facility] [address]  Phone: [phone]  Fax: [phone]              Juliocesar Salas  10/25/1934    Encounter Date: 3/9/2018    Serena Lincoln M.D.          PROGRESS NOTE:  Subjective:   Juliocesar Salas is a 83 y.o. male who presents today     In [place]  New to me - increased lasix for his ICM - thrombus and MI    Wife working hard to keep him healthy  Stressful  A bit better    Past Medical History:   Diagnosis Date   • Apical mural thrombus following MI (CMS-HCC)    • Arthritis    • Asthma    • Cardiac pacemaker in situ 3/2/2017    Pulse generator is a [employer] model [id number], serial # [id number], RA lead: [employer] model #2088TC/46 , serial #[id number], RV lead: [employer] model #2088TC/52 , serial #[id number], Dr. Jarrell    • Coronary artery disease involving native coronary artery without angina pectoris 11/25/2014    Severe stenosis of LAD and ramus intermedius with moderate proximal RCA and CFX disease and severe distal disease, PCI not successful and CABG declined by Dr. Mustafa and family.   • Diabetes 1988    oral agents   • Hypertension    • Non-STEMI (non-ST elevated myocardial infarction) (CMS-HCC)    • Rectus sheath hematoma 12/3/2014     Past Surgical History:   Procedure Laterality Date   • PACEMAKER INSERTION  March 2017    [employer] RF [id number] implanted by Dr. Jarrell. LV lead unable to be placed.   • HIP ARTHROPLASTY TOTAL  3/5/2012    Performed by [facility] ORR at SURGERY [facility] ORS   • COLONOSCOPY  2/2012   • HIP ARTHROPLASTY TOTAL  2007    right   • KNEE ARTHROPLASTY TOTAL  2002    bilateral   • INGUINAL HERNIA REPAIR  1986    right x2   • CATARACT EXTRACTION WITH IOL       Family History   Problem Relation Age of Onset   • Heart Attack Father 65     MI   • Diabetes     • Heart Disease     • Hypertension       History   Smoking Status   • Former Smoker   • Years: 1.00   • Types: Cigars   • Start date: 1/1/1993   •  "Quit date: 1/1/1994   Smokeless Tobacco   • Former User   • Types: Chew   • Quit date: 1/1/1994     Allergies   Allergen Reactions   • Nkda [No Known Drug Allergy]      Outpatient Encounter Prescriptions as of 3/9/2018   Medication Sig Dispense Refill   • nitroglycerin (NITROSTAT) 0.4 MG SL Tab Place 1 Tab under tongue as needed. 25 Tab 5   • atorvastatin (LIPITOR) 20 MG Tab Take 1 Tab by mouth every day. 30 Tab 11   • lisinopril (PRINIVIL) 2.5 MG Tab Take 2.5 mg by mouth every day.     • glipiZIDE SR (GLUCOTROL) 2.5 MG TABLET SR 24 HR Take 5 mg by mouth every day.     • zolpidem (AMBIEN) 10 MG Tab Take 10 mg by mouth at bedtime as needed for Sleep.     • carvedilol (COREG) 3.125 MG Tab Take 1 Tab by mouth 2 times a day, with meals. 180 Tab 3   • tamsulosin (FLOMAX) 0.4 MG capsule Take 0.4 mg by mouth ONE-HALF HOUR AFTER BREAKFAST.     • furosemide (LASIX) 40 MG Tab Take 40 mg by mouth. 1 TAB IN AM; 1/2 TAB IN AFTERNOON     • aspirin EC (ECOTRIN) 81 MG Tablet Delayed Response Take 81 mg by mouth every day.     • budesonide-formoterol (SYMBICORT) 160-4.5 MCG/ACT AERO Inhale 2 Puffs by mouth 2 Times a Day.     • Multiple Vitamin (MULTI-VITAMIN DAILY PO) Take  by mouth.     • vitamin D (CHOLECALCIFEROL) 1000 UNIT TABS Take 2,000 Units by mouth every day.     • potassium citrate SR (UROCIT-K SR) 10 MEQ (1080 MG) TBCR Take 10 mEq by mouth 2 Times a Day.       No facility-administered encounter medications on file as of 3/9/2018.      Review of Systems   Unable to perform ROS: Other   Constitutional:        Hard of hearing        Objective:   /86   Pulse 92   Ht 1.727 m (5' 8\")   Wt 86.2 kg (190 lb)   SpO2 91%   BMI 28.89 kg/m²      Physical Exam   Constitutional:   Elderly in wheelchair   HENT:   Head: Normocephalic and atraumatic.   Eyes: EOM are normal. Pupils are equal, round, and reactive to light. No scleral icterus.   Neck: JVD present. No thyromegaly present.   Cardiovascular: Normal rate and intact " distal pulses.    PM in chest   Pulmonary/Chest: Breath sounds normal. No respiratory distress. He exhibits no tenderness.   Abdominal: Bowel sounds are normal.   Musculoskeletal: He exhibits edema (1+ bilat).   Lymphadenopathy:     He has no cervical adenopathy.   Neurological: He exhibits normal muscle tone. Coordination normal.   Skin: Skin is dry. No rash noted.       Assessment:     1. Apical mural thrombus     2. Cardiac pacemaker in situ     3. Cardiomyopathy, unspecified type (CMS-HCC)     4. Chronic combined systolic and diastolic CHF (congestive heart failure) (CMS-HCC)     5. Coronary artery disease involving native coronary artery of native heart without angina pectoris     6. Ischemic cardiomyopathy         Medical Decision Making:  Today's Assessment / Status / Plan:       Extensive chart reviewed    CHF, acute on chronic  Ef 30 on 2017 echo  duiresis  End of life care addressed  meds as is   No thrombus noted  ?CRT  Has f/u with Ernestine RUBALCAVA in 2w - labs today kt Dixon M.D.  [address] [facility] [address]  VIA Facsimile: [phone]

## 2018-03-13 ENCOUNTER — HOSPITAL ENCOUNTER (OUTPATIENT)
Facility: MEDICAL CENTER | Age: 83
DRG: 293 | End: 2018-03-13
Payer: MEDICARE

## 2018-03-13 ENCOUNTER — HOSPITAL ENCOUNTER (INPATIENT)
Facility: MEDICAL CENTER | Age: 83
LOS: 2 days | DRG: 293 | End: 2018-03-15
Attending: INTERNAL MEDICINE | Admitting: HOSPITALIST
Payer: MEDICARE

## 2018-03-13 ENCOUNTER — RESOLUTE PROFESSIONAL BILLING HOSPITAL PROF FEE (OUTPATIENT)
Dept: HOSPITALIST | Facility: MEDICAL CENTER | Age: 83
End: 2018-03-13
Payer: MEDICARE

## 2018-03-13 DIAGNOSIS — I10 ESSENTIAL HYPERTENSION, BENIGN: ICD-10-CM

## 2018-03-13 DIAGNOSIS — I25.119 CORONARY ARTERY DISEASE INVOLVING NATIVE CORONARY ARTERY OF NATIVE HEART WITH ANGINA PECTORIS (HCC): ICD-10-CM

## 2018-03-13 PROCEDURE — 99223 1ST HOSP IP/OBS HIGH 75: CPT | Mod: AI | Performed by: HOSPITALIST

## 2018-03-13 PROCEDURE — 770020 HCHG ROOM/CARE - TELE (206)

## 2018-03-13 RX ORDER — BUDESONIDE AND FORMOTEROL FUMARATE DIHYDRATE 160; 4.5 UG/1; UG/1
2 AEROSOL RESPIRATORY (INHALATION) 2 TIMES DAILY
Status: DISCONTINUED | OUTPATIENT
Start: 2018-03-14 | End: 2018-03-15 | Stop reason: HOSPADM

## 2018-03-13 RX ORDER — DEXTROSE MONOHYDRATE 25 G/50ML
25 INJECTION, SOLUTION INTRAVENOUS
Status: DISCONTINUED | OUTPATIENT
Start: 2018-03-13 | End: 2018-03-15 | Stop reason: HOSPADM

## 2018-03-13 RX ORDER — ATORVASTATIN CALCIUM 20 MG/1
20 TABLET, FILM COATED ORAL DAILY
Status: DISCONTINUED | OUTPATIENT
Start: 2018-03-14 | End: 2018-03-15 | Stop reason: HOSPADM

## 2018-03-13 RX ORDER — ZOLPIDEM TARTRATE 5 MG/1
5 TABLET ORAL NIGHTLY PRN
Status: DISCONTINUED | OUTPATIENT
Start: 2018-03-13 | End: 2018-03-13

## 2018-03-13 RX ORDER — BISACODYL 10 MG
10 SUPPOSITORY, RECTAL RECTAL
Status: DISCONTINUED | OUTPATIENT
Start: 2018-03-13 | End: 2018-03-15 | Stop reason: HOSPADM

## 2018-03-13 RX ORDER — AMOXICILLIN 250 MG
2 CAPSULE ORAL 2 TIMES DAILY
Status: DISCONTINUED | OUTPATIENT
Start: 2018-03-14 | End: 2018-03-15 | Stop reason: HOSPADM

## 2018-03-13 RX ORDER — ZOLPIDEM TARTRATE 5 MG/1
10 TABLET ORAL NIGHTLY PRN
Status: DISCONTINUED | OUTPATIENT
Start: 2018-03-13 | End: 2018-03-15 | Stop reason: HOSPADM

## 2018-03-13 RX ORDER — ACETAMINOPHEN 325 MG/1
650 TABLET ORAL EVERY 6 HOURS PRN
Status: DISCONTINUED | OUTPATIENT
Start: 2018-03-13 | End: 2018-03-15 | Stop reason: HOSPADM

## 2018-03-13 RX ORDER — POLYETHYLENE GLYCOL 3350 17 G/17G
1 POWDER, FOR SOLUTION ORAL
Status: DISCONTINUED | OUTPATIENT
Start: 2018-03-13 | End: 2018-03-15 | Stop reason: HOSPADM

## 2018-03-13 RX ORDER — ALBUTEROL SULFATE 90 UG/1
2 AEROSOL, METERED RESPIRATORY (INHALATION) EVERY 4 HOURS PRN
Status: ON HOLD | COMMUNITY
End: 2019-03-04

## 2018-03-13 RX ORDER — FUROSEMIDE 10 MG/ML
40 INJECTION INTRAMUSCULAR; INTRAVENOUS
Status: DISCONTINUED | OUTPATIENT
Start: 2018-03-14 | End: 2018-03-15 | Stop reason: HOSPADM

## 2018-03-13 RX ORDER — POTASSIUM CHLORIDE 20 MEQ/1
20 TABLET, EXTENDED RELEASE ORAL DAILY
Status: DISCONTINUED | OUTPATIENT
Start: 2018-03-14 | End: 2018-03-15 | Stop reason: HOSPADM

## 2018-03-13 ASSESSMENT — PATIENT HEALTH QUESTIONNAIRE - PHQ9
SUM OF ALL RESPONSES TO PHQ9 QUESTIONS 1 AND 2: 0
2. FEELING DOWN, DEPRESSED, IRRITABLE, OR HOPELESS: NOT AT ALL
SUM OF ALL RESPONSES TO PHQ QUESTIONS 1-9: 0
1. LITTLE INTEREST OR PLEASURE IN DOING THINGS: NOT AT ALL

## 2018-03-13 ASSESSMENT — LIFESTYLE VARIABLES
ALCOHOL_USE: NO
EVER_SMOKED: NEVER

## 2018-03-13 NOTE — PROGRESS NOTES
Medical records received from [facility]:  ED report, Labs, EKGs, and Demographics.  Scanned into Media tab.

## 2018-03-13 NOTE — PROGRESS NOTES
Direct admit from [facility], Dr. Snowden, [phone].  Accepted by Dr. Patel for CHF exacerbation, CAD, Rt BBB/Lt LPFB.  ADT signed & held @ 0246, needs to be released upon pt arrival.  No written orders received.  Pt coming by ground.

## 2018-03-14 ENCOUNTER — APPOINTMENT (OUTPATIENT)
Dept: RADIOLOGY | Facility: MEDICAL CENTER | Age: 83
DRG: 293 | End: 2018-03-14
Attending: HOSPITALIST
Payer: MEDICARE

## 2018-03-14 PROBLEM — R79.89 ELEVATED BRAIN NATRIURETIC PEPTIDE (BNP) LEVEL: Status: ACTIVE | Noted: 2018-03-14

## 2018-03-14 LAB
ANION GAP SERPL CALC-SCNC: 9 MMOL/L (ref 0–11.9)
BNP SERPL-MCNC: 1081 PG/ML (ref 0–100)
BUN SERPL-MCNC: 24 MG/DL (ref 8–22)
CALCIUM SERPL-MCNC: 9.2 MG/DL (ref 8.5–10.5)
CHLORIDE SERPL-SCNC: 105 MMOL/L (ref 96–112)
CO2 SERPL-SCNC: 28 MMOL/L (ref 20–33)
CREAT SERPL-MCNC: 1.46 MG/DL (ref 0.5–1.4)
ERYTHROCYTE [DISTWIDTH] IN BLOOD BY AUTOMATED COUNT: 54.3 FL (ref 35.9–50)
GLUCOSE BLD-MCNC: 114 MG/DL (ref 65–99)
GLUCOSE BLD-MCNC: 148 MG/DL (ref 65–99)
GLUCOSE BLD-MCNC: 207 MG/DL (ref 65–99)
GLUCOSE BLD-MCNC: 209 MG/DL (ref 65–99)
GLUCOSE BLD-MCNC: 289 MG/DL (ref 65–99)
GLUCOSE SERPL-MCNC: 148 MG/DL (ref 65–99)
HCT VFR BLD AUTO: 41.6 % (ref 42–52)
HGB BLD-MCNC: 13.4 G/DL (ref 14–18)
LV EJECT FRACT  99904: 30
LV EJECT FRACT MOD 2C 99903: 41.73
LV EJECT FRACT MOD 4C 99902: 32.02
LV EJECT FRACT MOD BP 99901: 38.08
MCH RBC QN AUTO: 32.5 PG (ref 27–33)
MCHC RBC AUTO-ENTMCNC: 32.2 G/DL (ref 33.7–35.3)
MCV RBC AUTO: 101 FL (ref 81.4–97.8)
PLATELET # BLD AUTO: 179 K/UL (ref 164–446)
PMV BLD AUTO: 10.9 FL (ref 9–12.9)
POTASSIUM SERPL-SCNC: 3.5 MMOL/L (ref 3.6–5.5)
RBC # BLD AUTO: 4.12 M/UL (ref 4.7–6.1)
SODIUM SERPL-SCNC: 142 MMOL/L (ref 135–145)
WBC # BLD AUTO: 6.9 K/UL (ref 4.8–10.8)

## 2018-03-14 PROCEDURE — 700111 HCHG RX REV CODE 636 W/ 250 OVERRIDE (IP): Performed by: HOSPITALIST

## 2018-03-14 PROCEDURE — 85027 COMPLETE CBC AUTOMATED: CPT

## 2018-03-14 PROCEDURE — 71045 X-RAY EXAM CHEST 1 VIEW: CPT

## 2018-03-14 PROCEDURE — 99232 SBSQ HOSP IP/OBS MODERATE 35: CPT | Performed by: HOSPITALIST

## 2018-03-14 PROCEDURE — 36415 COLL VENOUS BLD VENIPUNCTURE: CPT

## 2018-03-14 PROCEDURE — 93306 TTE W/DOPPLER COMPLETE: CPT

## 2018-03-14 PROCEDURE — 83880 ASSAY OF NATRIURETIC PEPTIDE: CPT

## 2018-03-14 PROCEDURE — 700101 HCHG RX REV CODE 250: Performed by: HOSPITALIST

## 2018-03-14 PROCEDURE — 700102 HCHG RX REV CODE 250 W/ 637 OVERRIDE(OP): Performed by: HOSPITALIST

## 2018-03-14 PROCEDURE — 82962 GLUCOSE BLOOD TEST: CPT | Mod: 91

## 2018-03-14 PROCEDURE — A9270 NON-COVERED ITEM OR SERVICE: HCPCS | Performed by: HOSPITALIST

## 2018-03-14 PROCEDURE — 80048 BASIC METABOLIC PNL TOTAL CA: CPT

## 2018-03-14 PROCEDURE — 770020 HCHG ROOM/CARE - TELE (206)

## 2018-03-14 PROCEDURE — 93306 TTE W/DOPPLER COMPLETE: CPT | Mod: 26 | Performed by: INTERNAL MEDICINE

## 2018-03-14 RX ORDER — HEPARIN SODIUM 5000 [USP'U]/ML
5000 INJECTION, SOLUTION INTRAVENOUS; SUBCUTANEOUS EVERY 8 HOURS
Status: DISCONTINUED | OUTPATIENT
Start: 2018-03-14 | End: 2018-03-15 | Stop reason: HOSPADM

## 2018-03-14 RX ADMIN — FUROSEMIDE 40 MG: 10 INJECTION, SOLUTION INTRAMUSCULAR; INTRAVENOUS at 09:06

## 2018-03-14 RX ADMIN — ZOLPIDEM TARTRATE 10 MG: 5 TABLET ORAL at 00:23

## 2018-03-14 RX ADMIN — INSULIN HUMAN 5 UNITS: 100 INJECTION, SOLUTION PARENTERAL at 11:50

## 2018-03-14 RX ADMIN — BUDESONIDE AND FORMOTEROL FUMARATE DIHYDRATE 2 PUFF: 160; 4.5 AEROSOL RESPIRATORY (INHALATION) at 20:56

## 2018-03-14 RX ADMIN — POTASSIUM CHLORIDE 20 MEQ: 1500 TABLET, EXTENDED RELEASE ORAL at 09:06

## 2018-03-14 RX ADMIN — HEPARIN SODIUM 5000 UNITS: 5000 INJECTION, SOLUTION INTRAVENOUS; SUBCUTANEOUS at 21:15

## 2018-03-14 RX ADMIN — ASPIRIN 81 MG: 81 TABLET, COATED ORAL at 09:06

## 2018-03-14 RX ADMIN — MUPIROCIN 2 %: 20 OINTMENT TOPICAL at 20:58

## 2018-03-14 RX ADMIN — INSULIN HUMAN 3 UNITS: 100 INJECTION, SOLUTION PARENTERAL at 21:08

## 2018-03-14 RX ADMIN — ACETAMINOPHEN 650 MG: 325 TABLET, FILM COATED ORAL at 06:07

## 2018-03-14 RX ADMIN — ATORVASTATIN CALCIUM 20 MG: 20 TABLET, FILM COATED ORAL at 09:06

## 2018-03-14 RX ADMIN — BUDESONIDE AND FORMOTEROL FUMARATE DIHYDRATE 2 PUFF: 160; 4.5 AEROSOL RESPIRATORY (INHALATION) at 09:05

## 2018-03-14 RX ADMIN — HEPARIN SODIUM 5000 UNITS: 5000 INJECTION, SOLUTION INTRAVENOUS; SUBCUTANEOUS at 14:52

## 2018-03-14 RX ADMIN — ZOLPIDEM TARTRATE 10 MG: 5 TABLET ORAL at 21:03

## 2018-03-14 ASSESSMENT — ENCOUNTER SYMPTOMS
DIAPHORESIS: 0
TINGLING: 0
NERVOUS/ANXIOUS: 0
MUSCULOSKELETAL NEGATIVE: 1
DEPRESSION: 0
CONSTIPATION: 0
DOUBLE VISION: 0
HEARTBURN: 0
PSYCHIATRIC NEGATIVE: 1
WEAKNESS: 1
PHOTOPHOBIA: 0
SORE THROAT: 0
DIARRHEA: 0
CHILLS: 0
HEADACHES: 0
SEIZURES: 0
FEVER: 0
GASTROINTESTINAL NEGATIVE: 1
LOSS OF CONSCIOUSNESS: 0
CARDIOVASCULAR NEGATIVE: 1
BRUISES/BLEEDS EASILY: 0
HEMOPTYSIS: 0
NAUSEA: 0
EYES NEGATIVE: 1
COUGH: 0
BLOOD IN STOOL: 0
PALPITATIONS: 0
SHORTNESS OF BREATH: 1
VOMITING: 0
WHEEZING: 0
ABDOMINAL PAIN: 0
MYALGIAS: 0
DIZZINESS: 0
FOCAL WEAKNESS: 0

## 2018-03-14 ASSESSMENT — PAIN SCALES - GENERAL
PAINLEVEL_OUTOF10: 5
PAINLEVEL_OUTOF10: 0

## 2018-03-14 ASSESSMENT — PATIENT HEALTH QUESTIONNAIRE - PHQ9
SUM OF ALL RESPONSES TO PHQ9 QUESTIONS 1 AND 2: 0
1. LITTLE INTEREST OR PLEASURE IN DOING THINGS: NOT AT ALL
2. FEELING DOWN, DEPRESSED, IRRITABLE, OR HOPELESS: NOT AT ALL
SUM OF ALL RESPONSES TO PHQ QUESTIONS 1-9: 0

## 2018-03-14 NOTE — RESPIRATORY CARE
COPD EDUCATION by COPD CLINICAL EDUCATOR  3/14/2018 at 6:40 AM by Ruth Humphrey     Patient reviewed by COPD education team. Patient does not qualify for COPD program.

## 2018-03-14 NOTE — PROGRESS NOTES
[facility] Hospitalist Progress Note    Date of Service: 3/14/2018    Chief Complaint  83 y.o. male admitted 3/13/2018 with shortness of breath.    Interval Problem Update  Patient is found to have acute diastolic heart failure. Systolic component however may be there with the elevation of the BMP level. Patient's heart failure at this point will need to be carefully examined by echocardiograph E and the patient at this point will need aggressive diuresis. The patient at this point has improved with his shortness of breath away giving him oxygen support as needed. At this point echocardiogram is pending serial cardiac markers have been negative EKG has no changes    Consultants/Specialty  Cardiology    Disposition  To be determined        Review of Systems   Constitutional: Negative for chills, diaphoresis and fever.   HENT: Negative.    Eyes: Negative.  Negative for double vision.   Respiratory: Positive for shortness of breath. Negative for cough, hemoptysis and wheezing.    Cardiovascular: Negative.  Negative for chest pain, palpitations and leg swelling.   Gastrointestinal: Negative.  Negative for abdominal pain, blood in stool, constipation, diarrhea, heartburn, nausea and vomiting.   Genitourinary: Negative.  Negative for frequency, hematuria and urgency.   Musculoskeletal: Negative.  Negative for joint pain.   Skin: Negative.  Negative for itching and rash.   Neurological: Positive for weakness. Negative for dizziness, focal weakness, seizures, loss of consciousness and headaches.   Endo/Heme/Allergies: Negative.  Does not bruise/bleed easily.   Psychiatric/Behavioral: Negative.  Negative for suicidal ideas. The patient is not nervous/anxious.    All other systems reviewed and are negative.     Physical Exam  Laboratory/Imaging   Hemodynamics  Temp (24hrs), Av.2 °C (97.2 °F), Min:36.1 °C (97 °F), Max:36.4 °C (97.5 °F)   Temperature: 36.1 °C (97 °F)  Pulse  Av.8  Min: 52  Max: 90    Blood Pressure :  116/72      Respiratory      Respiration: 18, Pulse Oximetry: 93 %        RUL Breath Sounds: Clear, RML Breath Sounds: Clear, RLL Breath Sounds: Diminished, ZOILA Breath Sounds: Clear, LLL Breath Sounds: Diminished    Fluids    Intake/Output Summary (Last 24 hours) at 03/14/18 1508  Last data filed at 03/14/18 1400   Gross per 24 hour   Intake              360 ml   Output             1425 ml   Net            -1065 ml       Nutrition  Orders Placed This Encounter   Procedures   • Diet Order     Standing Status:   Standing     Number of Occurrences:   1     Order Specific Question:   Diet:     Answer:   Diabetic [3]     Order Specific Question:   Diet:     Answer:   2 Gram Sodium [7]     Order Specific Question:   Consistency/Fluid modifications:     Answer:   1800 ml Fluid Restriction [10]     Physical Exam   Constitutional: He is oriented to person, place, and time. He appears well-developed and well-nourished.   Hard of hearing   HENT:   Head: Normocephalic and atraumatic.   Right Ear: External ear normal.   Left Ear: External ear normal.   Nose: Nose normal.   Mouth/Throat: Oropharynx is clear and moist.   Eyes: Conjunctivae and EOM are normal. Pupils are equal, round, and reactive to light.   Neck: Normal range of motion. Neck supple. No JVD present. No thyromegaly present.   Cardiovascular: Normal rate and regular rhythm.    No murmur heard.  Pulmonary/Chest: Effort normal. He has decreased breath sounds in the right lower field and the left lower field. He has no wheezes. He has no rales. He exhibits no tenderness.   Abdominal: Soft. Bowel sounds are normal. He exhibits no distension and no mass. There is no tenderness. There is no rebound and no guarding.   Musculoskeletal: Normal range of motion. He exhibits no edema or tenderness.   Lymphadenopathy:     He has no cervical adenopathy.   Neurological: He is alert and oriented to person, place, and time. He has normal reflexes. No cranial nerve deficit.   Skin:  Skin is warm and dry. No rash noted. No erythema.   Psychiatric: He has a normal mood and affect.   Nursing note and vitals reviewed.      Recent Labs      03/14/18 0314   WBC  6.9   RBC  4.12*   HEMOGLOBIN  13.4*   HEMATOCRIT  41.6*   MCV  101.0*   MCH  32.5   MCHC  32.2*   RDW  54.3*   PLATELETCT  179   MPV  10.9     Recent Labs      03/14/18 0314   SODIUM  142   POTASSIUM  3.5*   CHLORIDE  105   CO2  28   GLUCOSE  148*   BUN  24*   CREATININE  1.46*   CALCIUM  9.2         Recent Labs      03/14/18 0314   BNPBTYPENAT  1081*              Assessment/Plan     * Elevated brain natriuretic peptide (BNP) level   Assessment & Plan    Most likely secondary to acute diastolic heart failure. The patient at this point may also have a component of systolic heart failure.  Patient is pending a echocardiogram evaluation.  Continue at this point with diuresis as well as beta blocker management.  Patient will need outpatient heart failure clinic.        Essential hypertension, benign- (present on admission)   Assessment & Plan    Patient's blood pressure will be carefully monitored and if the patient increases with the blood pressure we will optimize medical management the patient was an ACE inhibitor and beta blocker at home. The patient here is on a diuretic with aggressive management as the ACE inhibitor as well as beta blocker were decreased.        Coronary artery disease involving native coronary artery without angina pectoris- (present on admission)   Assessment & Plan    Continue with medical management optimization including Lipitor, aspirin, Coreg, lisinopril        COPD (chronic obstructive pulmonary disease) (CMS-HCC)- (present on admission)   Assessment & Plan    Patient has chronic COPD currently on room air and does not need at this point any support patient is not in acute exacerbation. Patient remains on Symbicort twice daily for inhalation management.        Diabetes type 2, controlled (CMS-HCC)- (present  on admission)   Assessment & Plan    -accus with sliding scale coverage  -diabetic diet  -diabetic education  -follow glycohemoglobin levels long term  -continue with oral antihyperglycemics  -monitor for hypoglycemic episodes and adjust control if he should get low          Quality-Core Measures   Reviewed items::  EKG reviewed, Labs reviewed, Medications reviewed and Radiology images reviewed  Thurman catheter::  No Thurman  DVT prophylaxis pharmacological::  Heparin  Ulcer Prophylaxis::  Not indicated  Assessed for rehabilitation services:  Patient was assess for and/or received rehabilitation services during this hospitalization

## 2018-03-14 NOTE — PROGRESS NOTES
Patient seen by hospitalist.  Patient needs addressed.  CHF education packet at bedside.  Patient denies any questions at this time.

## 2018-03-14 NOTE — ASSESSMENT & PLAN NOTE
Most likely secondary to acute diastolic heart failure. The patient at this point may also have a component of systolic heart failure.  Patient is pending a echocardiogram evaluation.  Continue at this point with diuresis as well as beta blocker management.  Patient will need outpatient heart failure clinic.

## 2018-03-14 NOTE — PROGRESS NOTES
Two RN skin check completed.  Patient has healing sore on left 4th digit. Heels are dry and flaky.  Blanching redness on bottom.

## 2018-03-14 NOTE — H&P
Hospital Medicine History and Physical    Date of Service  3/13/2018    Chief Complaint  Transferred from outside hospital for CHF exacerbation    History of Presenting Illness  83 y.o. male who presented on 3/13/2018 when outside hospital with complaints of shortness of breath. Review of the medical records from the outlying facility indicates that the patient was seen by his primary care provider and was reportedly found hypoxic. Because of his complaints of orthopnea for the last several days, he was started on a diuretic. After a recent trip back from [facility], he was reportedly found to be 82% on room air. He then presented to an outside hospital today where his BNP was noted to be elevated greater than thousand. Patient states that he has been shortness of breath with some orthopnea for the last several days. He denies any dyspnea on exertion. He has chronic congestion and sinusitis has not been actively ill. He did receive a flu shot this year and has had no cough, fevers, chills, nausea or vomiting. He denies any lower extremity edema. Patient carries a known history of coronary artery disease.        Primary Care Physician  Rebecca Dixon M.D.    Consultants  None    Code Status  Full    Review of Systems  Review of Systems   Constitutional: Negative for chills and fever.   HENT: Negative for congestion and sore throat.    Eyes: Negative for photophobia.   Respiratory: Positive for shortness of breath. Negative for cough and wheezing.    Cardiovascular: Negative for chest pain and palpitations.   Gastrointestinal: Negative for abdominal pain, diarrhea, nausea and vomiting.   Genitourinary: Negative for dysuria.   Musculoskeletal: Negative for myalgias.   Skin: Negative.    Neurological: Negative for dizziness, tingling, focal weakness and headaches.   Psychiatric/Behavioral: Negative for depression and suicidal ideas.        Past Medical History  Past Medical History:   Diagnosis Date   • Cardiac  pacemaker in situ 3/2/2017    Pulse generator is a SJM model HM4030, serial # [id number], RA lead: SJM model #2088TC/46 , serial #[id number], RV lead: SJM model #2088TC/52 , serial #[id number], Dr. Jarrell    • Rectus sheath hematoma 12/3/2014   • Coronary artery disease involving native coronary artery without angina pectoris 11/25/2014    Severe stenosis of LAD and ramus intermedius with moderate proximal RCA and CFX disease and severe distal disease, PCI not successful and CABG declined by Dr. Mustafa and family.   • Diabetes 1988    oral agents   • Apical mural thrombus following MI (CMS-HCC)    • Arthritis    • Asthma    • Hypertension    • Non-STEMI (non-ST elevated myocardial infarction) (CMS-HCC)        Surgical History  Past Surgical History:   Procedure Laterality Date   • PACEMAKER INSERTION  March 2017    [employer] RF [id number] implanted by Dr. Jarrell. LV lead unable to be placed.   • HIP ARTHROPLASTY TOTAL  3/5/2012    Performed by [facility] at SURGERY [facility] ORS   • COLONOSCOPY  2/2012   • HIP ARTHROPLASTY TOTAL  2007    right   • KNEE ARTHROPLASTY TOTAL  2002    bilateral   • INGUINAL HERNIA REPAIR  1986    right x2   • CATARACT EXTRACTION WITH IOL         Medications  No current facility-administered medications on file prior to encounter.      Current Outpatient Prescriptions on File Prior to Encounter   Medication Sig Dispense Refill   • nitroglycerin (NITROSTAT) 0.4 MG SL Tab Place 1 Tab under tongue as needed. 25 Tab 5   • atorvastatin (LIPITOR) 20 MG Tab Take 1 Tab by mouth every day. 30 Tab 11   • lisinopril (PRINIVIL) 2.5 MG Tab Take 2.5 mg by mouth every day.     • glipiZIDE SR (GLUCOTROL) 2.5 MG TABLET SR 24 HR Take 5 mg by mouth every day.     • zolpidem (AMBIEN) 10 MG Tab Take 10 mg by mouth at bedtime as needed for Sleep.     • carvedilol (COREG) 3.125 MG Tab Take 1 Tab by mouth 2 times a day, with meals. 180 Tab 3   • tamsulosin (FLOMAX) 0.4 MG capsule Take 0.4 mg by mouth  ONE-HALF HOUR AFTER BREAKFAST.     • furosemide (LASIX) 40 MG Tab Take 40 mg by mouth. 1 TAB IN AM; 1/2 TAB IN AFTERNOON     • aspirin EC (ECOTRIN) 81 MG Tablet Delayed Response Take 81 mg by mouth every day.     • budesonide-formoterol (SYMBICORT) 160-4.5 MCG/ACT AERO Inhale 2 Puffs by mouth 2 Times a Day.     • Multiple Vitamin (MULTI-VITAMIN DAILY PO) Take  by mouth.     • vitamin D (CHOLECALCIFEROL) 1000 UNIT TABS Take 2,000 Units by mouth every day.     • potassium citrate SR (UROCIT-K SR) 10 MEQ (1080 MG) TBCR Take 10 mEq by mouth 2 Times a Day.         Family History  Family History   Problem Relation Age of Onset   • Heart Attack Father 65     MI   • Diabetes     • Heart Disease     • Hypertension         Social History  Social History   Substance Use Topics   • Smoking status: Former Smoker     Years: 1.00     Types: Cigars     Start date: 1993     Quit date: 1994   • Smokeless tobacco: Former User     Types: Chew     Quit date: 1994   • Alcohol use Yes      Comment: 2 drinks every 2 weeks       Allergies  Allergies   Allergen Reactions   • Nkda [No Known Drug Allergy]         Physical Exam  Laboratory   Hemodynamics  Temp (24hrs), Av.4 °C (97.5 °F), Min:36.4 °C (97.5 °F), Max:36.4 °C (97.5 °F)   Temperature: 36.4 °C (97.5 °F)  Pulse  Av  Min: 90  Max: 90    Blood Pressure : 141/88      Respiratory      Respiration: 20, Pulse Oximetry: 94 %             Physical Exam   Constitutional: He is oriented to person, place, and time. No distress.   Elderly   HENT:   Head: Normocephalic and atraumatic.   Right Ear: External ear normal.   Left Ear: External ear normal.   Eyes: EOM are normal. Right eye exhibits no discharge. Left eye exhibits no discharge.   Neck: Neck supple. No JVD present.   Cardiovascular: Normal rate, regular rhythm and normal heart sounds.    Pulmonary/Chest: Effort normal and breath sounds normal. No respiratory distress. He exhibits no tenderness.   No significant  crackles heard, breath sounds are diminished   Abdominal: Soft. Bowel sounds are normal. He exhibits no distension. There is no tenderness.   Musculoskeletal: He exhibits no edema.   Neurological: He is alert and oriented to person, place, and time. No cranial nerve deficit.   Very hard of hearing   Skin: Skin is dry. He is not diaphoretic. No erythema.   Psychiatric: He has a normal mood and affect. His behavior is normal.   Nursing note and vitals reviewed.    Capillary refill less than 3 seconds, distal pulses intact            No results for input(s): ALTSGPT, ASTSGOT, ALKPHOSPHAT, TBILIRUBIN, DBILIRUBIN, GAMMAGT, AMYLASE, LIPASE, ALB, PREALBUMIN, GLUCOSE in the last 72 hours.              Lab Results   Component Value Date    TROPONINI 0.57 (H) 12/03/2014       Imaging  No results found.   Assessment/Plan     Anticipate that patient will need greater than 2 midnights for management of the discussed medical issues.    This dictation was created using voice recognition software. The accuracy of the dictation is limited to the abilities of the software. I expect there may be some errors of grammar and possibly content.    * Elevated brain natriuretic peptide (BNP) level   Assessment & Plan    Patient is currently stable on room air with no signs of hypoxia, he has a known history of coronary artery disease and his last echocardiogram obtained in February 2017 does not show a formal read. However from 2014, he was noted to have low normal ejection fraction at that time with a grade 2 diastolic dysfunction. His BNP was elevated at the outside hospital greater than one thousand. I will repeat an echocardiogram and start him on Lasix for diastolic heart failure exacerbation and potential systolic heart failure exacerbation. I will measure intake and output. If he has clinical improvement on Lasix and his renal function remains stable, then we'll consider increasing the dose. He'll be on respiratory therapy protocol as  needed. I will check a chest x-ray and a flu swab. If his blood pressure tolerates, will consider restarting his home ACE inhibitor and beta blocker. I'm holding both of these now to allow for aggressive diuresis. He will be on fluid and sodium restrictions.        Essential hypertension, benign- (present on admission)   Assessment & Plan    Holding home Prinivil and Coreg to allow for aggressive diuresis, if blood pressure tolerates, will begin to reintroduce these two home medications.        Coronary artery disease involving native coronary artery without angina pectoris- (present on admission)   Assessment & Plan    Patient with a history of known disease, apparently he was not a surgical candidate in the past. He currently has no chest pain. Continue home Lipitor And aspirin.        COPD (chronic obstructive pulmonary disease) (CMS-HCC)- (present on admission)   Assessment & Plan    No acute exacerbation, patient stable on room air, continue home inhalers and respiratory therapy as needed.        Diabetes type 2, controlled (CMS-HCC)- (present on admission)   Assessment & Plan    Holding home glipizide, monitor with Accu-Cheks and cover with insulin sliding scale.          Prophylaxis: Sequential compression devices for DVT prophylaxis, no PPI indicated, stool softeners ordered

## 2018-03-14 NOTE — CARE PLAN
Problem: Safety  Goal: Will remain free from injury    Intervention: Provide assistance with mobility   03/14/18 0358   OTHER   Assistance / Tolerance Assistance of One     Rn educated patient regarding the importance of calling for assistance to prevent the risk of injury due to falls.  Patient verbalized understanding of education and denies any questions at this time.        Problem: Skin Integrity  Goal: Risk for impaired skin integrity will decrease    Intervention: Assess and monitor skin integrity, appearance and/or temperature  RN educated patient regarding the importance of regular movement to decrease the risk of pressure ulcer formation.  Patient verbalized understanding of education and denies any questions at this time.

## 2018-03-14 NOTE — PROGRESS NOTES
Report received by NOC RN. Assumed care of pt. Assessment complete. Hearing amplifier at bedside. Pt A&O x 4. Pt has orthopnea-but sats low 90s on RA, has dyspnea with exertion-rest periods encouraged, and educated on 1800 mL fluid restriction. Pt in no apparent signs of distress. Plan of care discussed. Call light in reach,  bed in lowest position, and pt has no further questions at this time.

## 2018-03-14 NOTE — ASSESSMENT & PLAN NOTE
Patient has chronic COPD currently on room air and does not need at this point any support patient is not in acute exacerbation. Patient remains on Symbicort twice daily for inhalation management.

## 2018-03-14 NOTE — PROGRESS NOTES
Report called from [place].  Patient arrived to unit.  Assumed pt care.  Pt AAOx4, resting in bed comfortably with mild signs of labored breathing when talking.  Pt tele monitor in place, cardiac rhythm being monitored.  Pt call light within reach, bed in low position, non skid socks in place.  Pt denies any pain or other distress at this time.

## 2018-03-14 NOTE — CARE PLAN
Problem: Knowledge Deficit  Goal: Knowledge of disease process/condition, treatment plan, diagnostic tests, and medications will improve    Intervention: Explain information regarding disease process/condition, treatment plan, diagnostic tests, and medications and document in education  Pt and family educated on POC, all questions answered in regards to disease process, treatment and diet. Pt and family verbalize understanding and voice no further questions at this time. Reviewed HF education.         Problem: Respiratory:  Goal: Respiratory status will improve    Intervention: Educate and encourage incentive spirometry usage  Encouraged use of IS.  Will obtain home O2 numbers.

## 2018-03-14 NOTE — ASSESSMENT & PLAN NOTE
Patient's blood pressure will be carefully monitored and if the patient increases with the blood pressure we will optimize medical management the patient was an ACE inhibitor and beta blocker at home. The patient here is on a diuretic with aggressive management as the ACE inhibitor as well as beta blocker were decreased.

## 2018-03-15 ENCOUNTER — PATIENT OUTREACH (OUTPATIENT)
Dept: HEALTH INFORMATION MANAGEMENT | Facility: OTHER | Age: 83
End: 2018-03-15

## 2018-03-15 VITALS
BODY MASS INDEX: 27.02 KG/M2 | HEART RATE: 83 BPM | RESPIRATION RATE: 18 BRPM | DIASTOLIC BLOOD PRESSURE: 74 MMHG | OXYGEN SATURATION: 95 % | SYSTOLIC BLOOD PRESSURE: 117 MMHG | WEIGHT: 177.69 LBS | TEMPERATURE: 97 F

## 2018-03-15 LAB
GLUCOSE BLD-MCNC: 100 MG/DL (ref 65–99)
GLUCOSE BLD-MCNC: 190 MG/DL (ref 65–99)

## 2018-03-15 PROCEDURE — 82962 GLUCOSE BLOOD TEST: CPT

## 2018-03-15 PROCEDURE — 99239 HOSP IP/OBS DSCHRG MGMT >30: CPT | Performed by: HOSPITALIST

## 2018-03-15 PROCEDURE — 700111 HCHG RX REV CODE 636 W/ 250 OVERRIDE (IP): Performed by: HOSPITALIST

## 2018-03-15 PROCEDURE — 700111 HCHG RX REV CODE 636 W/ 250 OVERRIDE (IP): Performed by: STUDENT IN AN ORGANIZED HEALTH CARE EDUCATION/TRAINING PROGRAM

## 2018-03-15 RX ORDER — LORAZEPAM 2 MG/ML
0.5 INJECTION INTRAMUSCULAR
Status: COMPLETED | OUTPATIENT
Start: 2018-03-15 | End: 2018-03-15

## 2018-03-15 RX ORDER — CARVEDILOL 12.5 MG/1
12.5 TABLET ORAL 2 TIMES DAILY WITH MEALS
Qty: 60 TAB | Refills: 3 | Status: ON HOLD | OUTPATIENT
Start: 2018-03-15 | End: 2019-03-04

## 2018-03-15 RX ORDER — LORAZEPAM 2 MG/ML
1 INJECTION INTRAMUSCULAR ONCE
Status: COMPLETED | OUTPATIENT
Start: 2018-03-15 | End: 2018-03-15

## 2018-03-15 RX ORDER — HALOPERIDOL 5 MG/ML
5 INJECTION INTRAMUSCULAR ONCE
Status: COMPLETED | OUTPATIENT
Start: 2018-03-15 | End: 2018-03-15

## 2018-03-15 RX ORDER — FUROSEMIDE 40 MG/1
60 TABLET ORAL DAILY
Qty: 30 TAB | Refills: 3 | Status: ON HOLD | OUTPATIENT
Start: 2018-03-15 | End: 2019-03-04

## 2018-03-15 RX ADMIN — HALOPERIDOL LACTATE 5 MG: 5 INJECTION, SOLUTION INTRAMUSCULAR at 00:30

## 2018-03-15 RX ADMIN — LORAZEPAM 1 MG: 2 INJECTION INTRAMUSCULAR; INTRAVENOUS at 01:01

## 2018-03-15 RX ADMIN — FUROSEMIDE 40 MG: 10 INJECTION, SOLUTION INTRAMUSCULAR; INTRAVENOUS at 10:47

## 2018-03-15 RX ADMIN — HEPARIN SODIUM 5000 UNITS: 5000 INJECTION, SOLUTION INTRAVENOUS; SUBCUTANEOUS at 06:16

## 2018-03-15 RX ADMIN — MUPIROCIN 1 APPLICATION: 20 OINTMENT TOPICAL at 10:47

## 2018-03-15 RX ADMIN — LORAZEPAM 0.5 MG: 2 INJECTION INTRAMUSCULAR; INTRAVENOUS at 02:46

## 2018-03-15 RX ADMIN — INSULIN HUMAN 2 UNITS: 100 INJECTION, SOLUTION PARENTERAL at 12:57

## 2018-03-15 ASSESSMENT — PAIN SCALES - GENERAL: PAINLEVEL_OUTOF10: 0

## 2018-03-15 NOTE — DISCHARGE SUMMARY
CHIEF COMPLAINT ON ADMISSION  No chief complaint on file.      CODE STATUS  DNR    HPI & HOSPITAL COURSE  This is a 83 y.o. male here with acute shortness of breath. The patient apparently over the past several days has worsened with his leg edema as well as his exercise tolerance. This was noticed by the wife. Patient has recently seen cardiology but at that point in time was not in heart failure exacerbation. The patient on this admission was found to have a low ejection fraction 30%. The patient was adequately diuresed and at this point the patient has a negative intake of 2.1 L. The patient overall at this point has improved. The patient was explained along with his wife about sodium restriction including diet and exercise and medication management. The patient was also advised that his weight should fluctuate or increase he should take an extra Lasix and also immediately call his cardiologist or his family doctor.      The patient met 2-midnight criteria for an inpatient stay at the time of discharge.      Discharged in good and stable condition with close outpatient follow-up.    SPECIFIC OUTPATIENT FOLLOW-UP  Follow-up with primary care physician in 7-10 days.  Follow-up with cardiology and congestive heart failure clinic.    DISCHARGE PROBLEM LIST  Principal Problem:    Elevated brain natriuretic peptide (BNP) level POA: Unknown  Active Problems:    Diabetes type 2, controlled (CMS-[facility]) POA: Yes    COPD (chronic obstructive pulmonary disease) (CMS-[facility]) POA: Yes    Coronary artery disease involving native coronary artery without angina pectoris POA: Yes      Overview: Severe stenosis of LAD and ramus intermedius with moderate proximal RCA       and CFX disease and severe distal disease, PCI not successful and CABG       declined by Dr. Mustafa and family.    Essential hypertension, benign POA: Yes  Resolved Problems:    * No resolved hospital problems. *      FOLLOW UP  Future Appointments  Date Time Provider  Department Center   3/22/2018 2:40 PM [facility] None     Rebecca Dixon M.D.  [address] [facility] [id number] [phone]    Schedule an appointment as soon as possible for a visit in 1 week  Hospital follow-up appointment with PCP      MEDICATIONS ON DISCHARGE   Juliocesar Salas   Home Medication Instructions JOEY:[id number]    Printed on:03/15/18 1637   Medication Information                      albuterol 108 (90 Base) MCG/ACT Aero Soln inhalation aerosol  Inhale 2 Puffs by mouth every four hours as needed for Shortness of Breath.             aspirin EC (ECOTRIN) 81 MG Tablet Delayed Response  Take 81 mg by mouth every day.             atorvastatin (LIPITOR) 20 MG Tab  Take 1 Tab by mouth every day.             budesonide-formoterol (SYMBICORT) 160-4.5 MCG/ACT AERO  Inhale 2 Puffs by mouth 2 Times a Day.             carvedilol (COREG) 12.5 MG Tab  Take 1 Tab by mouth 2 times a day, with meals.             furosemide (LASIX) 40 MG Tab  Take 1.5 Tabs by mouth every day. 1 TAB IN AM; 1/2 TAB IN AFTERNOON             glipiZIDE SR (GLUCOTROL) 2.5 MG TABLET SR 24 HR  Take 5 mg by mouth every day.             lisinopril (PRINIVIL) 2.5 MG Tab  Take 2.5 mg by mouth every day.             Multiple Vitamin (MULTI-VITAMIN DAILY PO)  Take  by mouth.             nitroglycerin (NITROSTAT) 0.4 MG SL Tab  Place 1 Tab under tongue as needed.             potassium citrate SR (UROCIT-K SR) 10 MEQ (1080 MG) TBCR  Take 10 mEq by mouth 2 Times a Day.             tamsulosin (FLOMAX) 0.4 MG capsule  Take 0.4 mg by mouth ONE-HALF HOUR AFTER BREAKFAST.             vitamin D (CHOLECALCIFEROL) 1000 UNIT TABS  Take 2,000 Units by mouth every day.             zolpidem (AMBIEN) 10 MG Tab  Take 10 mg by mouth at bedtime as needed for Sleep.                 DIET  Orders Placed This Encounter   Procedures   • Diet Order     Standing Status:   Standing     Number of Occurrences:   1     Order Specific Question:   Diet:      Answer:   Diabetic [3]     Order Specific Question:   Diet:     Answer:   2 Gram Sodium [7]     Order Specific Question:   Consistency/Fluid modifications:     Answer:   1800 ml Fluid Restriction [10]       ACTIVITY  As tolerated.  Weight bearing as tolerated      CONSULTATIONS  Cardiology    PROCEDURES  None    LABORATORY  Lab Results   Component Value Date/Time    SODIUM 142 03/14/2018 03:14 AM    POTASSIUM 3.5 (L) 03/14/2018 03:14 AM    CHLORIDE 105 03/14/2018 03:14 AM    CO2 28 03/14/2018 03:14 AM    GLUCOSE 148 (H) 03/14/2018 03:14 AM    BUN 24 (H) 03/14/2018 03:14 AM    CREATININE 1.46 (H) 03/14/2018 03:14 AM        Lab Results   Component Value Date/Time    WBC 6.9 03/14/2018 03:14 AM    HEMOGLOBIN 13.4 (L) 03/14/2018 03:14 AM    HEMATOCRIT 41.6 (L) 03/14/2018 03:14 AM    PLATELETCT 179 03/14/2018 03:14 AM        Total time of the discharge process exceeds 40 minutes

## 2018-03-15 NOTE — DISCHARGE PLANNING
Medical SW    SW met with pt and wife bedside. SW gave pt wife IMM copy and signed copy placed in chart. SW explained HH options for if getting home and not feeling well.     Plan: SW to remain available for any dc needs.

## 2018-03-15 NOTE — DISCHARGE INSTRUCTIONS
Heart Failure    Special Instructions:   HF Patient Discharge Instructions  · Monitor your weight daily, and maintain a weight chart, to track your weight changes.   · Activity as tolerated, unless your Doctor has ordered otherwise. Other activity order: Activity as tolerated.  · Follow a low fat, low cholesterol, low salt diet unless instructed otherwise by your Doctor. Read the labels on the back of food products and track your intake of fat, cholesterol and salt.   · Fluid Restriction No. If a Fluid Restriction has been ordered by your Doctor, measure fluids with a measuring cup to ensure that you are not exceeding the restriction.   · No smoking.  · Oxygen No. If your Doctor has ordered that you wear Oxygen at home, it is important to wear it as ordered.  · Did you receive an explanation from staff on the importance of taking each of your medications and why it is necessary to keep taking them unless your doctor says to stop? Yes  · Were all of your questions answered about how to manage your heart failure and what to do if you have increased signs and symptoms after you go home? Yes  · Do you feel like your heart failure care team involved you in the care treatment plan and allowed you to make decisions regarding your care while in the hospital and addressed any discharge needs you might have? Yes    See the educational handout provided at discharge for more information on monitoring your daily weight, activity and diet. This also explains more about Heart Failure, symptoms of a flare-up and some of the tests that you have undergone.     Warning Signs of a Flare-Up include:  · Swelling in the ankles or lower legs.  · Shortness of breath, while at rest, or while doing normal activities.   · Shortness of breath at night when in bed, or coughing in bed.   · Requiring more pillows to sleep at night, or needing to sit up at night to sleep.  · Feeling weak, dizzy or fatigued.     When to call your Doctor:  · Call  [facility] Hotline seven days a week from 8:00 a.m. to 8:00 p.m. for medical questions [phone].  · Call your Primary Care Physician or Cardiologist if:   1. You experience any pain radiating to your jaw or neck.  2. You have any difficulty breathing.  3. You experience weight gain of 3 lbs in a day or 5 lbs in a week.   4. You feel any palpitations or irregular heartbeats.  5. You become dizzy or lose consciousness.   If you have had an angiogram or had a pacemaker or AICD placed, and experience:  1. Bleeding, drainage or swelling at the surgical / puncture site.  2. Fever greater than 100.0 F  3. Shock from internal defibrillator.  4. Cool and / or numb extremities.          Heart failure means your heart has trouble pumping blood. This makes it hard for your body to work well. Heart failure is usually a long-term (chronic) condition. You must take good care of yourself and follow your doctor's treatment plan.  HOME CARE  · Take your heart medicine as told by your doctor.  ¨ Do not stop taking medicine unless your doctor tells you to.  ¨ Do not skip any dose of medicine.  ¨ Refill your medicines before they run out.  ¨ Take other medicines only as told by your doctor or pharmacist.  · Stay active if told by your doctor. The elderly and people with severe heart failure should talk with a doctor about physical activity.  · Eat heart-healthy foods. Choose foods that are without trans fat and are low in saturated fat, cholesterol, and salt (sodium). This includes fresh or frozen fruits and vegetables, fish, lean meats, fat-free or low-fat dairy foods, whole grains, and high-fiber foods. Lentils and dried peas and beans (legumes) are also good choices.  · Limit salt if told by your doctor.  · Cook in a healthy way. Roast, grill, broil, bake, poach, steam, or stir-lyons foods.  · Limit fluids as told by your doctor.  · Weigh yourself every morning. Do this after you pee (urinate) and before you eat breakfast.  Write down your weight to give to your doctor.  · Take your blood pressure and write it down if your doctor tells you to.  · Ask your doctor how to check your pulse. Check your pulse as told.  · Lose weight if told by your doctor.  · Stop smoking or chewing tobacco. Do not use gum or patches that help you quit without your doctor's approval.  · Schedule and go to doctor visits as told.  · Nonpregnant women should have no more than 1 drink a day. Men should have no more than 2 drinks a day. Talk to your doctor about drinking alcohol.  · Stop illegal drug use.  · Stay current with shots (immunizations).  · Manage your health conditions as told by your doctor.  · Learn to manage your stress.  · Rest when you are tired.  · If it is really hot outside:  ¨ Avoid intense activities.  ¨ Use air conditioning or fans, or get in a cooler place.  ¨ Avoid caffeine and alcohol.  ¨ Wear loose-fitting, lightweight, and light-colored clothing.  · If it is really cold outside:  ¨ Avoid intense activities.  ¨ Layer your clothing.  ¨ Wear mittens or gloves, a hat, and a scarf when going outside.  ¨ Avoid alcohol.  · Learn about heart failure and get support as needed.  · Get help to maintain or improve your quality of life and your ability to care for yourself as needed.  GET HELP IF:   · You gain weight quickly.  · You are more short of breath than usual.  · You cannot do your normal activities.  · You tire easily.  · You cough more than normal, especially with activity.  · You have any or more puffiness (swelling) in areas such as your hands, feet, ankles, or belly (abdomen).  · You cannot sleep because it is hard to breathe.  · You feel like your heart is beating fast (palpitations).  · You get dizzy or light-headed when you stand up.  GET HELP RIGHT AWAY IF:   · You have trouble breathing.  · There is a change in mental status, such as becoming less alert or not being able to focus.  · You have chest pain or discomfort.  · You  faint.  MAKE SURE YOU:   · Understand these instructions.  · Will watch your condition.  · Will get help right away if you are not doing well or get worse.  This information is not intended to replace advice given to you by your health care provider. Make sure you discuss any questions you have with your health care provider.  Document Released: 09/26/2009 Document Revised: 01/08/2016 Document Reviewed: 02/03/2014  [employer] Interactive Patient Education © 2017 [employer]  Discharge Instructions    Discharged to home by car with relative. Discharged via wheelchair, hospital escort: Yes.  Special equipment needed: Not Applicable    Be sure to schedule a follow-up appointment with your primary care doctor or any specialists as instructed.     Discharge Plan:   Diet Plan: Discussed  Activity Level: Discussed  Confirmed Follow up Appointment: Appointment Scheduled  Confirmed Symptoms Management: Discussed  Medication Reconciliation Updated: Yes  Influenza Vaccine Indication: Not indicated: Previously immunized this influenza season and > 8 years of age    I understand that a diet low in cholesterol, fat, and sodium is recommended for good health. Unless I have been given specific instructions below for another diet, I accept this instruction as my diet prescription.   Other diet: CARDIAC    Special Instructions: 1) FOLLOW UP WITH YOUR DOCTORS AS SCHEDULED.                                      2)  TAKE ALL MEDICATIONS AS PRESCRIBED.      · Is patient discharged on Warfarin / Coumadin?   No     Depression / Suicide Risk    As you are discharged from this [facility] facility, it is important to learn how to keep safe from harming yourself.    Recognize the warning signs:  · Abrupt changes in personality, positive or negative- including increase in energy   · Giving away possessions  · Change in eating patterns- significant weight changes-  positive or negative  · Change in sleeping patterns- unable to sleep or  sleeping all the time   · Unwillingness or inability to communicate  · Depression  · Unusual sadness, discouragement and loneliness  · Talk of wanting to die  · Neglect of personal appearance   · Rebelliousness- reckless behavior  · Withdrawal from people/activities they love  · Confusion- inability to concentrate     If you or a loved one observes any of these behaviors or has concerns about self-harm, here's what you can do:  · Talk about it- your feelings and reasons for harming yourself  · Remove any means that you might use to hurt yourself (examples: pills, rope, extension cords, firearm)  · Get professional help from the community (Mental Health, Substance Abuse, psychological counseling)  · Do not be alone:Call your Safe Contact- someone whom you trust who will be there for you.  · Call your local CRISIS HOTLINE 236-9001 or 281-689-3778  · Call your local Children's Mobile Crisis Response Team [place] (844) 020-5105 or www.[employer]  · Call the toll free National Suicide Prevention Hotlines   · National Suicide Prevention Lifeline 514-134-JVED (2758)  · National Hope Line Network 800-SUICIDE (047-5900)

## 2018-03-15 NOTE — CARE PLAN
Problem: Communication  Goal: The ability to communicate needs accurately and effectively will improve    Intervention: Reorient patient to environment as needed  Pt A&O x1. Required frequent reorientation to surroundings throughout shift.

## 2018-03-15 NOTE — CARE PLAN
Problem: Safety  Goal: Will remain free from falls    Intervention: Assess risk factors for falls  Pt increasingly confused and disoriented.

## 2018-03-15 NOTE — PROGRESS NOTES
Pt remained agitated after administration of haldol 5mg IV as ordered. Paged dr. Plunkett. Orders received. Will continue to monitor.

## 2018-03-15 NOTE — CARE PLAN
Problem: Fluid Volume:  Goal: Will maintain balanced intake and output  Outcome: PROGRESSING AS EXPECTED  I & Os appropriate

## 2018-03-15 NOTE — PROGRESS NOTES
Patient discharged home with family via wheelchair to auto. All devices removed, except for condom catheter per family. VSS. Pt and family aware to f/u as scheduled for HF.

## 2018-03-15 NOTE — PROGRESS NOTES
Bedside report received, Pt care assumed. Tele box on. VSS. Pt assessment complete. Pt AOX3, pt agitated, attempting to get out of bed and leave room. Verbal reminder that patient is at hospital. Re oriented to time and place.  Pt c/o of 0/10 pain. Pt denies any additional needs at this time. Bed in lowest position, bed alarm is on, ambulates with standby assistance. POC discussed with Pt/family, verbalizes understanding, no questions at this time. Pt educated on fall risk and verbalizes understanding, call light within reach, will continue to monitor.

## 2018-03-15 NOTE — CARE PLAN
Problem: Safety  Goal: Will remain free from injury  Outcome: PROGRESSING AS EXPECTED  Bed alarm armed, patient resting comfortably in bed

## 2018-03-15 NOTE — PROGRESS NOTES
Pt agitated. Refused to stay in bed. Paged Dr. Plunkett for orders. Orders received for soft 2 point restraints. 5mg IV Haldol once. Will continue to monitor.

## 2018-03-16 NOTE — DOCUMENTATION QUERY
"DOCUMENTATION QUERY    PROVIDERS: Please select “Cosign w/ note”to reply to query.    To better represent the severity of illness of your patient, please review the following information and exercise your independent professional judgment in responding to this query.     \"Heart failure will need to be carefully examined\" has been documented in the Progress Notes. Based upon the clinical findings, risk factors, and treatment, can you please clarify the status of heart failure for this patient?     • Acute Systolic heart failure   • Chronic Systolic heart failure  • Acute on Chronic Systolic heart failure  • Acute Diastolic heart failure   • Chronic Diastolic heart failure  • Acute on Chronic Diastolic heart failure  • Acute Systolic and Diastolic heart failure  • Chronic Systolic and Diastolic heart failure  • Acute on Chronic Systolic and Diastolic heart failure  • Other explanation of clinical findings  • Unable to determine    The medical record reflects the following:   Clinical Findings Per Progress Notes 3/14:  Elevated brain natriuretic peptide (BNP) level  Most likely secondary to acute diastolic heart failure. The patient at this point may also have a component of systolic heart failure.    Per D/C Summary:   The patient on this admission was found to have a low ejection fraction 30%.     Results Review:   BNP 1081  CXR: 1.  Cardiomegaly. 2.  Atelectasis within the right lung base.   ECHO:   EF 25-30%.   Grade II diastolic dysfunction.   Mild aortic stenosis  Trace mitral regurgitation  RSVP 60 mmHg.    Treatment Echocardiogram, CXR, BNP, IV Lasix, daily weights, I&O, Consult Heart Nurse Navigator, & D/C instructions including 2gm sodium diet with 1800ml fluid restriction, Lasix, Coreg, & lisinopril   Risk Factors Acute diastolic heart failure, HTN, CAD, cardiac pacemaker in situ, DM2, & COPD   Location within medical record History & Physical, Progress Notes, Discharge Summary, Lab Results, & Radiology " Results     Thank you,   Allison Corbett RN  Clinical   Phone [phone]

## 2018-03-21 ENCOUNTER — OFFICE VISIT (OUTPATIENT)
Dept: URGENT CARE | Facility: PHYSICIAN GROUP | Age: 83
End: 2018-03-21
Payer: MEDICARE

## 2018-03-21 VITALS
OXYGEN SATURATION: 94 % | SYSTOLIC BLOOD PRESSURE: 108 MMHG | RESPIRATION RATE: 20 BRPM | HEART RATE: 74 BPM | WEIGHT: 190 LBS | DIASTOLIC BLOOD PRESSURE: 62 MMHG | TEMPERATURE: 98.1 F | BODY MASS INDEX: 28.79 KG/M2 | HEIGHT: 68 IN

## 2018-03-21 DIAGNOSIS — M1A.9XX1 TOPHUS OF TOE CONCURRENT WITH AND DUE TO GOUT: ICD-10-CM

## 2018-03-21 PROCEDURE — 99204 OFFICE O/P NEW MOD 45 MIN: CPT | Performed by: PHYSICIAN ASSISTANT

## 2018-03-21 RX ORDER — COLCHICINE 0.6 MG/1
TABLET ORAL
Qty: 3 TAB | Refills: 0 | Status: ON HOLD | OUTPATIENT
Start: 2018-03-21 | End: 2019-03-04

## 2018-03-21 NOTE — PROGRESS NOTES
Chief Complaint   Patient presents with   • Foot Problem     L Foot; hx of gout,        HISTORY OF PRESENT ILLNESS: Patient is a 83 y.o. male who presents today because he has pain of his left middle digit on his lower extremity. He has a long history of gout, he does see a podiatrist who he saw a couple weeks ago who gave him an ointment to put on the area. He has pain, particularly with movement or pressure.    Patient Active Problem List    Diagnosis Date Noted   • Elevated brain natriuretic peptide (BNP) level 03/14/2018     Priority: High   • Edema 03/08/2018   • Shortness of breath 03/08/2018   • Chronic combined systolic and diastolic CHF (congestive heart failure) (CMS-HCC) 07/27/2017   • Right bundle branch block 03/02/2017   • Cardiomyopathy (CMS-HCC) 03/02/2017   • Cardiac pacemaker in situ 03/02/2017   • Asthma in adult without complication 06/09/2016   • Essential hypertension, benign 06/08/2016   • CKD (chronic kidney disease) stage 3, GFR 30-59 ml/min 06/08/2016   • Ischemic cardiomyopathy 02/20/2015   • Rectus sheath hematoma 12/03/2014   • RLS (restless legs syndrome) 12/03/2014   • Apical mural thrombus 11/26/2014   • Coronary artery disease involving native coronary artery without angina pectoris 11/25/2014   • H/O non-ST elevation myocardial infarction (NSTEMI) 11/25/2014   • Diabetes type 2, controlled (CMS-HCC) 05/19/2014   • COPD (chronic obstructive pulmonary disease) (CMS-HCC) 05/19/2014   • BPH (benign prostatic hyperplasia) 05/19/2014   • Mixed hyperlipidemia 05/19/2014       Allergies:Nkda [no known drug allergy]    Current Outpatient Prescriptions Ordered in [facility]   Medication Sig Dispense Refill   • colchicine (COLCRYS) 0.6 MG Tab 1.2 mg PO x1 then 0.6 mg one hour later 3 Tab 0   • carvedilol (COREG) 12.5 MG Tab Take 1 Tab by mouth 2 times a day, with meals. 60 Tab 3   • furosemide (LASIX) 40 MG Tab Take 1.5 Tabs by mouth every day. 1 TAB IN AM; 1/2 TAB IN AFTERNOON 30 Tab 3   •  albuterol 108 (90 Base) MCG/ACT Aero Soln inhalation aerosol Inhale 2 Puffs by mouth every four hours as needed for Shortness of Breath.     • nitroglycerin (NITROSTAT) 0.4 MG SL Tab Place 1 Tab under tongue as needed. 25 Tab 5   • atorvastatin (LIPITOR) 20 MG Tab Take 1 Tab by mouth every day. 30 Tab 11   • lisinopril (PRINIVIL) 2.5 MG Tab Take 2.5 mg by mouth every day.     • glipiZIDE SR (GLUCOTROL) 2.5 MG TABLET SR 24 HR Take 5 mg by mouth every day.     • zolpidem (AMBIEN) 10 MG Tab Take 10 mg by mouth at bedtime as needed for Sleep.     • tamsulosin (FLOMAX) 0.4 MG capsule Take 0.4 mg by mouth ONE-HALF HOUR AFTER BREAKFAST.     • aspirin EC (ECOTRIN) 81 MG Tablet Delayed Response Take 81 mg by mouth every day.     • budesonide-formoterol (SYMBICORT) 160-4.5 MCG/ACT AERO Inhale 2 Puffs by mouth 2 Times a Day.     • Multiple Vitamin (MULTI-VITAMIN DAILY PO) Take  by mouth.     • vitamin D (CHOLECALCIFEROL) 1000 UNIT TABS Take 2,000 Units by mouth every day.     • potassium citrate SR (UROCIT-K SR) 10 MEQ (1080 MG) TBCR Take 10 mEq by mouth 2 Times a Day.       No current Psychiatric-ordered facility-administered medications on file.        Past Medical History:   Diagnosis Date   • Apical mural thrombus following MI (CMS-HCC)    • Arthritis    • Asthma    • Cardiac pacemaker in situ 3/2/2017    Pulse generator is a "[employer] model QU7991, serial # [id number], RA lead: SJM model #2088TC/46 , serial #[id number], RV lead: SJM model #2088TC/52 , serial #[id number], Dr. Jarrell    • Coronary artery disease involving native coronary artery without angina pectoris 11/25/2014    Severe stenosis of LAD and ramus intermedius with moderate proximal RCA and CFX disease and severe distal disease, PCI not successful and CABG declined by Dr. Mustafa and family.   • Diabetes 1988    oral agents   • Hypertension    • Non-STEMI (non-ST elevated myocardial infarction) (CMS-HCC)    • Rectus sheath hematoma 12/3/2014       Social History   Substance Use  "Topics   • Smoking status: Former Smoker     Years: 1.00     Types: Cigars     Start date: 1993     Quit date: 1994   • Smokeless tobacco: Former User     Types: Chew     Quit date: 1994   • Alcohol use Yes      Comment: 2 drinks every 2 weeks       Family Status   Relation Status   • Mother  at age 90    hemorrhage brain   • Father  at age 72    heart attack   • Brother  at age 40    kidney failure   • Paternal Uncle  at age 55    heart   • Son  at age 47    multiple causes   •       Family History   Problem Relation Age of Onset   • Heart Attack Father 65     MI   • Diabetes     • Heart Disease     • Hypertension         ROS:  Review of Systems   Constitutional: Negative for fever, chills, weight loss and malaise/fatigue.   HENT: Negative for ear pain, nosebleeds, congestion, sore throat and neck pain.    Eyes: Negative for blurred vision.   Respiratory: Negative for cough, sputum production, shortness of breath and wheezing.    Cardiovascular: Negative for chest pain, palpitations, orthopnea and leg swelling.   Gastrointestinal: Negative for heartburn, nausea, vomiting and abdominal pain.   Genitourinary: Negative for dysuria, urgency and frequency.     Exam:  Blood pressure 108/62, pulse 74, temperature 36.7 °C (98.1 °F), resp. rate 20, height 1.727 m (5' 8\"), weight 86.2 kg (190 lb), SpO2 94 %.  General:  Well nourished, well developed male in NAD  Head:Normocephalic, atraumatic  Eyes: PERRLA, EOM within normal limits, no conjunctival injection, no scleral icterus, visual fields and acuity grossly intact.  Extremities: no clubbing, cyanosis, or edema.Left third digit has interphalangeal tophi formation with surrounding tenderness, erythema, swelling       Please note that this dictation was created using voice recognition software. I have made every reasonable attempt to correct obvious errors, but I expect that there are errors of grammar and possibly content " that I did not discover before finalizing the note.    Assessment/Plan:  1. 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

## 2018-03-22 ENCOUNTER — OFFICE VISIT (OUTPATIENT)
Dept: CARDIOLOGY | Facility: PHYSICIAN GROUP | Age: 83
End: 2018-03-22
Payer: MEDICARE

## 2018-03-22 VITALS
HEART RATE: 86 BPM | SYSTOLIC BLOOD PRESSURE: 122 MMHG | WEIGHT: 171 LBS | BODY MASS INDEX: 25.91 KG/M2 | HEIGHT: 68 IN | OXYGEN SATURATION: 90 % | DIASTOLIC BLOOD PRESSURE: 82 MMHG

## 2018-03-22 DIAGNOSIS — I50.42 CHRONIC COMBINED SYSTOLIC AND DIASTOLIC CHF (CONGESTIVE HEART FAILURE) (HCC): ICD-10-CM

## 2018-03-22 DIAGNOSIS — Z95.0 CARDIAC PACEMAKER IN SITU: ICD-10-CM

## 2018-03-22 DIAGNOSIS — I42.9 CARDIOMYOPATHY, UNSPECIFIED TYPE (HCC): ICD-10-CM

## 2018-03-22 DIAGNOSIS — J44.9 CHRONIC OBSTRUCTIVE PULMONARY DISEASE, UNSPECIFIED COPD TYPE (HCC): ICD-10-CM

## 2018-03-22 DIAGNOSIS — I25.10 CORONARY ARTERY DISEASE INVOLVING NATIVE CORONARY ARTERY OF NATIVE HEART WITHOUT ANGINA PECTORIS: ICD-10-CM

## 2018-03-22 DIAGNOSIS — E78.2 MIXED HYPERLIPIDEMIA: ICD-10-CM

## 2018-03-22 DIAGNOSIS — R06.02 SHORTNESS OF BREATH: ICD-10-CM

## 2018-03-22 DIAGNOSIS — R60.0 LOCALIZED EDEMA: ICD-10-CM

## 2018-03-22 DIAGNOSIS — I10 ESSENTIAL HYPERTENSION, BENIGN: ICD-10-CM

## 2018-03-22 PROCEDURE — 99214 OFFICE O/P EST MOD 30 MIN: CPT | Performed by: NURSE PRACTITIONER

## 2018-03-22 ASSESSMENT — ENCOUNTER SYMPTOMS
HEADACHES: 0
CHILLS: 0
MYALGIAS: 0
ABDOMINAL PAIN: 0
PND: 0
FEVER: 0
PALPITATIONS: 0
LOSS OF CONSCIOUSNESS: 0
BRUISES/BLEEDS EASILY: 0
SHORTNESS OF BREATH: 0
DIZZINESS: 0

## 2018-03-22 NOTE — PROGRESS NOTES
Chief Complaint   Patient presents with   • Hospital Follow-up   • CHF (Chronic)   • Edema   • Shortness of Breath       Subjective:   Juliocesar Salas is a 83 y.o. male who presents today for hospital follow-up of CHF exacerbation.    Juliocesar is an 83 year old male with history of CAD/ischemic cardiomyopathy with LVEF 25-30% with CRT-P in March 2017 (with LV lead unable to be implanted due to tortuosity), hypertension, hyperlipidemia, diabetes and COPD, previously followed by Dr. Hernandez, and more recently by Dr. SHAYY Lincoln.    Two weeks ago, he was seen in clinic with increasing shortness of breath and edema. I increased his Lasix and potassium, but a few days later, he was doing worse. He presented to [facility], and was admitted for acute CHF. Echocardiogram showed LVEF 25-30%, and he responded well to diuresis.    He is here today for follow-up. He is on Lasix 40mg in the AM and 20mg in the PM. He is feeling much better. Breathing and energy levels are improved. No chest pain, pressure or discomfort; no palpitations; no dizziness or syncope;. No edema at all.     In the last few weeks, he has noticed increased shortness of breath and edema; he is also unable to lie flat. No chest pain, pressure or discomfort; no palpitations; no dizziness or syncope.        Past Medical History:   Diagnosis Date   • Apical mural thrombus following MI (CMS-HCC)    • Arthritis    • Asthma    • Cardiac pacemaker in situ 3/2/2017    Pulse generator is a SJM model MV2152, serial # [id number], RA lead: SJM model #2088TC/46 , serial #[id number], RV lead: SJM model #2088TC/52 , serial #[id number], Dr. Jarrell    • Coronary artery disease involving native coronary artery without angina pectoris 11/25/2014    Severe stenosis of LAD and ramus intermedius with moderate proximal RCA and CFX disease and severe distal disease, PCI not successful and CABG declined by Dr. Mustafa and family.   • Diabetes 1988    oral agents   • Hypertension    • Non-STEMI  (non-ST elevated myocardial infarction) (CMS-HCC)    • Rectus sheath hematoma 12/3/2014     Past Surgical History:   Procedure Laterality Date   • PACEMAKER INSERTION  March 2017    [employer] RF [id number] implanted by Dr. Jarrell. LV lead unable to be placed.   • HIP ARTHROPLASTY TOTAL  3/5/2012    Performed by [facility]   • COLONOSCOPY  2/2012   • HIP ARTHROPLASTY TOTAL  2007    right   • KNEE ARTHROPLASTY TOTAL  2002    bilateral   • INGUINAL HERNIA REPAIR  1986    right x2   • CATARACT EXTRACTION WITH IOL       Family History   Problem Relation Age of Onset   • Heart Attack Father 65     MI   • Diabetes     • Heart Disease     • Hypertension       Social History     Social History   • Marital status:      Spouse name: N/A   • Number of children: N/A   • Years of education: N/A     Occupational History   • Not on file.     Social History Main Topics   • Smoking status: Former Smoker     Years: 1.00     Types: Cigars     Start date: 1/1/1993     Quit date: 1/1/1994   • Smokeless tobacco: Former User     Types: Chew     Quit date: 1/1/1994   • Alcohol use Yes      Comment: 2 drinks every 2 weeks   • Drug use: No   • Sexual activity: Not on file     Other Topics Concern   • Not on file     Social History Narrative   • No narrative on file     Allergies   Allergen Reactions   • Nkda [No Known Drug Allergy]      Outpatient Encounter Prescriptions as of 3/22/2018   Medication Sig Dispense Refill   • colchicine (COLCRYS) 0.6 MG Tab 1.2 mg PO x1 then 0.6 mg one hour later 3 Tab 0   • carvedilol (COREG) 12.5 MG Tab Take 1 Tab by mouth 2 times a day, with meals. 60 Tab 3   • furosemide (LASIX) 40 MG Tab Take 1.5 Tabs by mouth every day. 1 TAB IN AM; 1/2 TAB IN AFTERNOON 30 Tab 3   • albuterol 108 (90 Base) MCG/ACT Aero Soln inhalation aerosol Inhale 2 Puffs by mouth every four hours as needed for Shortness of Breath.     • nitroglycerin (NITROSTAT) 0.4 MG SL Tab Place 1 Tab  "under tongue as needed. 25 Tab 5   • atorvastatin (LIPITOR) 20 MG Tab Take 1 Tab by mouth every day. 30 Tab 11   • lisinopril (PRINIVIL) 2.5 MG Tab Take 2.5 mg by mouth every day.     • glipiZIDE SR (GLUCOTROL) 2.5 MG TABLET SR 24 HR Take 5 mg by mouth every day.     • zolpidem (AMBIEN) 10 MG Tab Take 10 mg by mouth at bedtime as needed for Sleep.     • tamsulosin (FLOMAX) 0.4 MG capsule Take 0.4 mg by mouth ONE-HALF HOUR AFTER BREAKFAST.     • aspirin EC (ECOTRIN) 81 MG Tablet Delayed Response Take 81 mg by mouth every day.     • budesonide-formoterol (SYMBICORT) 160-4.5 MCG/ACT AERO Inhale 2 Puffs by mouth 2 Times a Day.     • Multiple Vitamin (MULTI-VITAMIN DAILY PO) Take  by mouth.     • vitamin D (CHOLECALCIFEROL) 1000 UNIT TABS Take 2,000 Units by mouth every day.     • potassium citrate SR (UROCIT-K SR) 10 MEQ (1080 MG) TBCR Take 10 mEq by mouth 2 Times a Day.       No facility-administered encounter medications on file as of 3/22/2018.      Review of Systems   Constitutional: Negative for chills and fever.   Respiratory: Negative for shortness of breath.    Cardiovascular: Negative for chest pain, palpitations, leg swelling and PND.   Gastrointestinal: Negative for abdominal pain.   Musculoskeletal: Negative for myalgias.   Neurological: Negative for dizziness, loss of consciousness and headaches.   Endo/Heme/Allergies: Does not bruise/bleed easily.        Objective:   /82   Pulse 86   Ht 1.727 m (5' 8\")   Wt 77.6 kg (171 lb)   SpO2 90%   BMI 26.00 kg/m²     Physical Exam   Constitutional: He is oriented to person, place, and time. He appears well-developed and well-nourished.   HENT:   Head: Normocephalic.   Eyes: EOM are normal.   Neck: Normal range of motion. Neck supple. No JVD present.   Cardiovascular: Normal rate, regular rhythm and normal heart sounds.    Pulmonary/Chest: Effort normal and breath sounds normal. No respiratory distress. He has no wheezes. He has no rales.   PM in left chest " wall.   Abdominal: Soft. Bowel sounds are normal. He exhibits no distension. There is no tenderness.   Musculoskeletal: Normal range of motion. He exhibits no edema.   Neurological: He is alert and oriented to person, place, and time.   Skin: Skin is warm and dry. No rash noted.   Skin changes of chronic venous insufficiency.   Psychiatric: He has a normal mood and affect.     Lab Results   Component Value Date/Time    SODIUM 142 03/14/2018 03:14 AM    POTASSIUM 3.5 (L) 03/14/2018 03:14 AM    CHLORIDE 105 03/14/2018 03:14 AM    CO2 28 03/14/2018 03:14 AM    GLUCOSE 148 (H) 03/14/2018 03:14 AM    BUN 24 (H) 03/14/2018 03:14 AM    CREATININE 1.46 (H) 03/14/2018 03:14 AM     Lab Results   Component Value Date/Time    WBC 6.9 03/14/2018 03:14 AM    RBC 4.12 (L) 03/14/2018 03:14 AM    HEMOGLOBIN 13.4 (L) 03/14/2018 03:14 AM    HEMATOCRIT 41.6 (L) 03/14/2018 03:14 AM    .0 (H) 03/14/2018 03:14 AM    MCH 32.5 03/14/2018 03:14 AM    MCHC 32.2 (L) 03/14/2018 03:14 AM    MPV 10.9 03/14/2018 03:14 AM      CONCLUSIONS OF ECHOCARDIOGRAM OF 3/14/2018 - REVIEWED WITH PATIENT:  No prior study is available for comparison.   Severely reduced left ventricular systolic function.  Left ventricular ejection fraction is visually estimated to be %.  Abnormal septal motion consistent with ventricular pacing.  Global hypokinesis with regional variation and normal lateral wall   motion.  Mild aortic stenosis.  Mild aortic insufficiency.  Trace mitral regurgitation.  Mildly dilated left atrium.  Dilated inferior vena cava without inspiratory collapse.  Pacer/ICD wire seen in right atrium.  Moderate tricuspid regurgitation.  Estimated right ventricular systolic pressure  is 60 mmHg.  Small posterior pericardial effusion without evidence of hemodynamic   compromise.    Assessment:     1. Localized edema     2. Shortness of breath     3. Chronic combined systolic and diastolic CHF (congestive heart failure) (CMS-HCC)     4. Coronary  artery disease involving native coronary artery of native heart without angina pectoris     5. Cardiomyopathy, unspecified type (CMS-HCC)     6. Cardiac pacemaker in situ     7. Essential hypertension, benign     8. Mixed hyperlipidemia     9. Chronic obstructive pulmonary disease, unspecified COPD type (CMS-HCC)         Medical Decision Making:  Today's Assessment / Status / Plan:     1. Edema and shortness of breath, with recent hospitalization for CHF exacerbation, improved. Continue with same dose of Lasix and potassium. Recheck BMP.    2. CAD/cardiomyopathy with CRT-P, with LV lead unable to function. Was checked a few weeks ago, with normal function.    3. Hypertension, treated and stable.    4. Hyperlipidemia, treated.    5. COPD, treated and stable.    Plan as above. BMP this week. FU with me in 6-8 weeks, sooner if clinical condition changes. He is reminded about daily weights and watching salt intake.    Collaborating MD: Ramos

## 2018-03-22 NOTE — LETTER
[facility] [address]  Phone: [phone]  Fax: [phone]              Juliocesar Salas  10/25/1934    Encounter Date: 3/22/2018    LORNE Astorga          PROGRESS NOTE:  Chief Complaint   Patient presents with   • Hospital Follow-up   • CHF (Chronic)   • Edema   • Shortness of Breath       Subjective:   Juliocesar Salas is a 83 y.o. male who presents today for hospital follow-up of CHF exacerbation.    Juliocesar is an 83 year old male with history of CAD/ischemic cardiomyopathy with LVEF 25-30% with CRT-P in March 2017 (with LV lead unable to be implanted due to tortuosity), hypertension, hyperlipidemia, diabetes and COPD, previously followed by Dr. Hernandez, and more recently by Dr. SHAYY Lincoln.    Two weeks ago, he was seen in clinic with increasing shortness of breath and edema. I increased his Lasix and potassium, but a few days later, he was doing worse. He presented to [facility], and was admitted for acute CHF. Echocardiogram showed LVEF 25-30%, and he responded well to diuresis.    He is here today for follow-up. He is on Lasix 40mg in the AM and 20mg in the PM. He is feeling much better. Breathing and energy levels are improved. No chest pain, pressure or discomfort; no palpitations; no dizziness or syncope;. No edema at all.     In the last few weeks, he has noticed increased shortness of breath and edema; he is also unable to lie flat. No chest pain, pressure or discomfort; no palpitations; no dizziness or syncope.        Past Medical History:   Diagnosis Date   • Apical mural thrombus following MI (CMS-HCC)    • Arthritis    • Asthma    • Cardiac pacemaker in situ 3/2/2017    Pulse generator is a SJM model BE2706, serial # [id number], RA lead: SJM model #2088TC/46 , serial #[id number], RV lead: SJM model #2088TC/52 , serial #[id number], Dr. Jarrell    • Coronary artery disease involving native coronary artery without angina pectoris  11/25/2014    Severe stenosis of LAD and ramus intermedius with moderate proximal RCA and CFX disease and severe distal disease, PCI not successful and CABG declined by Dr. Mustafa and family.   • Diabetes 1988    oral agents   • Hypertension    • Non-STEMI (non-ST elevated myocardial infarction) (CMS-HCC)    • Rectus sheath hematoma 12/3/2014     Past Surgical History:   Procedure Laterality Date   • PACEMAKER INSERTION  March 2017    [employer]ure RF [id number] implanted by Dr. Jarrell. LV lead unable to be placed.   • HIP ARTHROPLASTY TOTAL  3/5/2012    Performed by [facility] at SURGERY [facility] ORS   • COLONOSCOPY  2/2012   • HIP ARTHROPLASTY TOTAL  2007    right   • KNEE ARTHROPLASTY TOTAL  2002    bilateral   • INGUINAL HERNIA REPAIR  1986    right x2   • CATARACT EXTRACTION WITH IOL       Family History   Problem Relation Age of Onset   • Heart Attack Father 65     MI   • Diabetes     • Heart Disease     • Hypertension       Social History     Social History   • Marital status:      Spouse name: N/A   • Number of children: N/A   • Years of education: N/A     Occupational History   • Not on file.     Social History Main Topics   • Smoking status: Former Smoker     Years: 1.00     Types: Cigars     Start date: 1/1/1993     Quit date: 1/1/1994   • Smokeless tobacco: Former User     Types: Chew     Quit date: 1/1/1994   • Alcohol use Yes      Comment: 2 drinks every 2 weeks   • Drug use: No   • Sexual activity: Not on file     Other Topics Concern   • Not on file     Social History Narrative   • No narrative on file     Allergies   Allergen Reactions   • Nkda [No Known Drug Allergy]      Outpatient Encounter Prescriptions as of 3/22/2018   Medication Sig Dispense Refill   • colchicine (COLCRYS) 0.6 MG Tab 1.2 mg PO x1 then 0.6 mg one hour later 3 Tab 0   • carvedilol (COREG) 12.5 MG Tab Take 1 Tab by mouth 2 times a day, with meals. 60 Tab 3   • furosemide (LASIX) 40 MG Tab Take 1.5 Tabs by mouth  "every day. 1 TAB IN AM; 1/2 TAB IN AFTERNOON 30 Tab 3   • albuterol 108 (90 Base) MCG/ACT Aero Soln inhalation aerosol Inhale 2 Puffs by mouth every four hours as needed for Shortness of Breath.     • nitroglycerin (NITROSTAT) 0.4 MG SL Tab Place 1 Tab under tongue as needed. 25 Tab 5   • atorvastatin (LIPITOR) 20 MG Tab Take 1 Tab by mouth every day. 30 Tab 11   • lisinopril (PRINIVIL) 2.5 MG Tab Take 2.5 mg by mouth every day.     • glipiZIDE SR (GLUCOTROL) 2.5 MG TABLET SR 24 HR Take 5 mg by mouth every day.     • zolpidem (AMBIEN) 10 MG Tab Take 10 mg by mouth at bedtime as needed for Sleep.     • tamsulosin (FLOMAX) 0.4 MG capsule Take 0.4 mg by mouth ONE-HALF HOUR AFTER BREAKFAST.     • aspirin EC (ECOTRIN) 81 MG Tablet Delayed Response Take 81 mg by mouth every day.     • budesonide-formoterol (SYMBICORT) 160-4.5 MCG/ACT AERO Inhale 2 Puffs by mouth 2 Times a Day.     • Multiple Vitamin (MULTI-VITAMIN DAILY PO) Take  by mouth.     • vitamin D (CHOLECALCIFEROL) 1000 UNIT TABS Take 2,000 Units by mouth every day.     • potassium citrate SR (UROCIT-K SR) 10 MEQ (1080 MG) TBCR Take 10 mEq by mouth 2 Times a Day.       No facility-administered encounter medications on file as of 3/22/2018.      Review of Systems   Constitutional: Negative for chills and fever.   Respiratory: Negative for shortness of breath.    Cardiovascular: Negative for chest pain, palpitations, leg swelling and PND.   Gastrointestinal: Negative for abdominal pain.   Musculoskeletal: Negative for myalgias.   Neurological: Negative for dizziness, loss of consciousness and headaches.   Endo/Heme/Allergies: Does not bruise/bleed easily.        Objective:   /82   Pulse 86   Ht 1.727 m (5' 8\")   Wt 77.6 kg (171 lb)   SpO2 90%   BMI 26.00 kg/m²      Physical Exam   Constitutional: He is oriented to person, place, and time. He appears well-developed and well-nourished.   HENT:   Head: Normocephalic.   Eyes: EOM are normal.   Neck: Normal " range of motion. Neck supple. No JVD present.   Cardiovascular: Normal rate, regular rhythm and normal heart sounds.    Pulmonary/Chest: Effort normal and breath sounds normal. No respiratory distress. He has no wheezes. He has no rales.   PM in left chest wall.   Abdominal: Soft. Bowel sounds are normal. He exhibits no distension. There is no tenderness.   Musculoskeletal: Normal range of motion. He exhibits no edema.   Neurological: He is alert and oriented to person, place, and time.   Skin: Skin is warm and dry. No rash noted.   Skin changes of chronic venous insufficiency.   Psychiatric: He has a normal mood and affect.     Lab Results   Component Value Date/Time    SODIUM 142 03/14/2018 03:14 AM    POTASSIUM 3.5 (L) 03/14/2018 03:14 AM    CHLORIDE 105 03/14/2018 03:14 AM    CO2 28 03/14/2018 03:14 AM    GLUCOSE 148 (H) 03/14/2018 03:14 AM    BUN 24 (H) 03/14/2018 03:14 AM    CREATININE 1.46 (H) 03/14/2018 03:14 AM     Lab Results   Component Value Date/Time    WBC 6.9 03/14/2018 03:14 AM    RBC 4.12 (L) 03/14/2018 03:14 AM    HEMOGLOBIN 13.4 (L) 03/14/2018 03:14 AM    HEMATOCRIT 41.6 (L) 03/14/2018 03:14 AM    .0 (H) 03/14/2018 03:14 AM    MCH 32.5 03/14/2018 03:14 AM    MCHC 32.2 (L) 03/14/2018 03:14 AM    MPV 10.9 03/14/2018 03:14 AM      CONCLUSIONS OF ECHOCARDIOGRAM OF 3/14/2018 - REVIEWED WITH PATIENT:  No prior study is available for comparison.   Severely reduced left ventricular systolic function.  Left ventricular ejection fraction is visually estimated to be %.  Abnormal septal motion consistent with ventricular pacing.  Global hypokinesis with regional variation and normal lateral wall   motion.  Mild aortic stenosis.  Mild aortic insufficiency.  Trace mitral regurgitation.  Mildly dilated left atrium.  Dilated inferior vena cava without inspiratory collapse.  Pacer/ICD wire seen in right atrium.  Moderate tricuspid regurgitation.  Estimated right ventricular systolic pressure  is 60  mmHg.  Small posterior pericardial effusion without evidence of hemodynamic   compromise.    Assessment:     1. Localized edema     2. Shortness of breath     3. Chronic combined systolic and diastolic CHF (congestive heart failure) (CMS-[facility])     4. Coronary artery disease involving native coronary artery of native heart without angina pectoris     5. Cardiomyopathy, unspecified type (CMS-[facility])     6. Cardiac pacemaker in situ     7. Essential hypertension, benign     8. Mixed hyperlipidemia     9. Chronic obstructive pulmonary disease, unspecified COPD type (CMS-HCC)         Medical Decision Making:  Today's Assessment / Status / Plan:     1. Edema and shortness of breath, with recent hospitalization for CHF exacerbation, improved. Continue with same dose of Lasix and potassium. Recheck BMP.    2. CAD/cardiomyopathy with CRT-P, with LV lead unable to function. Was checked a few weeks ago, with normal function.    3. Hypertension, treated and stable.    4. Hyperlipidemia, treated.    5. COPD, treated and stable.    Plan as above. BMP this week. FU with me in 6-8 weeks, sooner if clinical condition changes. He is reminded about daily weights and watching salt intake.    Collaborating MD: Ramos Quiroga Recipients

## 2018-05-24 ENCOUNTER — NON-PROVIDER VISIT (OUTPATIENT)
Dept: CARDIOLOGY | Facility: PHYSICIAN GROUP | Age: 83
End: 2018-05-24
Payer: MEDICARE

## 2018-05-24 ENCOUNTER — OFFICE VISIT (OUTPATIENT)
Dept: CARDIOLOGY | Facility: PHYSICIAN GROUP | Age: 83
End: 2018-05-24
Payer: MEDICARE

## 2018-05-24 VITALS
HEIGHT: 68 IN | HEART RATE: 80 BPM | WEIGHT: 175 LBS | DIASTOLIC BLOOD PRESSURE: 72 MMHG | OXYGEN SATURATION: 93 % | SYSTOLIC BLOOD PRESSURE: 118 MMHG | BODY MASS INDEX: 26.52 KG/M2

## 2018-05-24 VITALS
HEIGHT: 68 IN | SYSTOLIC BLOOD PRESSURE: 118 MMHG | OXYGEN SATURATION: 93 % | HEART RATE: 80 BPM | WEIGHT: 175 LBS | DIASTOLIC BLOOD PRESSURE: 72 MMHG | BODY MASS INDEX: 26.52 KG/M2

## 2018-05-24 DIAGNOSIS — I42.9 CARDIOMYOPATHY, UNSPECIFIED TYPE (HCC): ICD-10-CM

## 2018-05-24 DIAGNOSIS — I50.42 CHRONIC COMBINED SYSTOLIC AND DIASTOLIC CHF (CONGESTIVE HEART FAILURE) (HCC): ICD-10-CM

## 2018-05-24 DIAGNOSIS — E78.2 MIXED HYPERLIPIDEMIA: ICD-10-CM

## 2018-05-24 DIAGNOSIS — I10 ESSENTIAL HYPERTENSION, BENIGN: ICD-10-CM

## 2018-05-24 DIAGNOSIS — J44.9 CHRONIC OBSTRUCTIVE PULMONARY DISEASE, UNSPECIFIED COPD TYPE (HCC): ICD-10-CM

## 2018-05-24 DIAGNOSIS — I25.10 CORONARY ARTERY DISEASE INVOLVING NATIVE CORONARY ARTERY OF NATIVE HEART WITHOUT ANGINA PECTORIS: ICD-10-CM

## 2018-05-24 DIAGNOSIS — Z95.0 CARDIAC PACEMAKER IN SITU: ICD-10-CM

## 2018-05-24 DIAGNOSIS — I25.5 ISCHEMIC CARDIOMYOPATHY: ICD-10-CM

## 2018-05-24 PROBLEM — R79.89 ELEVATED BRAIN NATRIURETIC PEPTIDE (BNP) LEVEL: Status: RESOLVED | Noted: 2018-03-14 | Resolved: 2018-05-24

## 2018-05-24 PROBLEM — R60.9 EDEMA: Status: RESOLVED | Noted: 2018-03-08 | Resolved: 2018-05-24

## 2018-05-24 PROBLEM — R06.02 SHORTNESS OF BREATH: Status: RESOLVED | Noted: 2018-03-08 | Resolved: 2018-05-24

## 2018-05-24 PROCEDURE — 99214 OFFICE O/P EST MOD 30 MIN: CPT | Performed by: NURSE PRACTITIONER

## 2018-05-24 PROCEDURE — 93288 INTERROG EVL PM/LDLS PM IP: CPT | Performed by: NURSE PRACTITIONER

## 2018-05-24 ASSESSMENT — ENCOUNTER SYMPTOMS
SHORTNESS OF BREATH: 0
MYALGIAS: 0
DIZZINESS: 0
PALPITATIONS: 0
LOSS OF CONSCIOUSNESS: 0
ABDOMINAL PAIN: 0
BRUISES/BLEEDS EASILY: 0
CHILLS: 0
HEADACHES: 0
FEVER: 0
PND: 0

## 2018-05-24 NOTE — LETTER
[facility] [address]  Phone: [phone]  Fax: [phone]              Juliocesar Salas  10/25/1934    Encounter Date: 5/24/2018    LORNE Astorga          PROGRESS NOTE:  Chief Complaint   Patient presents with   • Hypertension     Follow up       Subjective:   Juliocesar Salas is a 83 y.o. male who presents today for two month follow-up for PM check, and recent hospitalization for CHF.    Juliocesar is an 83 year old male with history of CAD/ischemic cardiomyopathy with LVEF 25-30% with CRT-P in March 2017 (with LV lead unable to be implanted due to tortuosity), hypertension, hyperlipidemia, diabetes and COPD, previously followed by Dr. Hernandez, and more recently by Dr. SHAYY Lincoln.    In March 2018, he was hospitalized for CHF; Echocardiogram showed LVEF 25-30%, and he responded well to diuresis. I saw him for follow-up the week after, and medication doses were left the same.    He is here today for follow-up. He has been stable, and breathing is good. No dyspnea on exertion, orthopnea or PND; no chest pain, pressure or discomfort; no palpitations; no dizziness or syncope; no edema. He has not had any blood work done.    Past Medical History:   Diagnosis Date   • Apical mural thrombus following MI (HCC)    • Arthritis    • Asthma    • Cardiac pacemaker in situ 3/2/2017    Pulse generator is a SJM model WS4740, serial # [id number], RA lead: SJM model #2088TC/46 , serial #[id number], RV lead: SJM model #2088TC/52 , serial #[id number], Dr. Jarrell    • Coronary artery disease involving native coronary artery without angina pectoris 11/25/2014    Severe stenosis of LAD and ramus intermedius with moderate proximal RCA and CFX disease and severe distal disease, PCI not successful and CABG declined by Dr. Mustafa and family.   • Diabetes 1988    oral agents   • Hypertension    • Ischemic cardiomyopathy 03/2018    Echocardiogram with LVEF 25-30%.  Mild AS, mild AI, trace MR. Moderate TR. RVSP 60mmHg.   • Non-STEMI (non-ST elevated myocardial infarction) (HCC)    • Rectus sheath hematoma 12/3/2014     Past Surgical History:   Procedure Laterality Date   • PACEMAKER INSERTION  March 2017    [employer] RF [id number] implanted by Dr. Jarrell. LV lead unable to be placed.   • HIP ARTHROPLASTY TOTAL  3/5/2012    Performed by [facility]   • COLONOSCOPY  2/2012   • HIP ARTHROPLASTY TOTAL  2007    right   • KNEE ARTHROPLASTY TOTAL  2002    bilateral   • INGUINAL HERNIA REPAIR  1986    right x2   • CATARACT EXTRACTION WITH IOL       Family History   Problem Relation Age of Onset   • Heart Attack Father 65     MI   • Diabetes     • Heart Disease     • Hypertension       Social History     Social History   • Marital status:      Spouse name: N/A   • Number of children: N/A   • Years of education: N/A     Occupational History   • Not on file.     Social History Main Topics   • Smoking status: Former Smoker     Years: 1.00     Types: Cigars     Start date: 1/1/1993     Quit date: 1/1/1994   • Smokeless tobacco: Former User     Types: Chew     Quit date: 1/1/1994   • Alcohol use Yes      Comment: 2 drinks every 2 weeks   • Drug use: No   • Sexual activity: Not on file     Other Topics Concern   • Not on file     Social History Narrative   • No narrative on file     Allergies   Allergen Reactions   • Nkda [No Known Drug Allergy]      Outpatient Encounter Prescriptions as of 5/24/2018   Medication Sig Dispense Refill   • colchicine (COLCRYS) 0.6 MG Tab 1.2 mg PO x1 then 0.6 mg one hour later 3 Tab 0   • carvedilol (COREG) 12.5 MG Tab Take 1 Tab by mouth 2 times a day, with meals. 60 Tab 3   • furosemide (LASIX) 40 MG Tab Take 1.5 Tabs by mouth every day. 1 TAB IN AM; 1/2 TAB IN AFTERNOON 30 Tab 3   • albuterol 108 (90 Base) MCG/ACT Aero Soln inhalation aerosol Inhale 2 Puffs by mouth every four hours as needed for Shortness of  "Breath.     • atorvastatin (LIPITOR) 20 MG Tab Take 1 Tab by mouth every day. 30 Tab 11   • lisinopril (PRINIVIL) 2.5 MG Tab Take 2.5 mg by mouth every day.     • glipiZIDE SR (GLUCOTROL) 2.5 MG TABLET SR 24 HR Take 5 mg by mouth every day.     • zolpidem (AMBIEN) 10 MG Tab Take 10 mg by mouth at bedtime as needed for Sleep.     • tamsulosin (FLOMAX) 0.4 MG capsule Take 0.4 mg by mouth ONE-HALF HOUR AFTER BREAKFAST.     • aspirin EC (ECOTRIN) 81 MG Tablet Delayed Response Take 81 mg by mouth every day.     • budesonide-formoterol (SYMBICORT) 160-4.5 MCG/ACT AERO Inhale 2 Puffs by mouth 2 Times a Day.     • Multiple Vitamin (MULTI-VITAMIN DAILY PO) Take  by mouth.     • vitamin D (CHOLECALCIFEROL) 1000 UNIT TABS Take 2,000 Units by mouth every day.     • potassium citrate SR (UROCIT-K SR) 10 MEQ (1080 MG) TBCR Take 10 mEq by mouth 2 Times a Day.     • nitroglycerin (NITROSTAT) 0.4 MG SL Tab Place 1 Tab under tongue as needed. 25 Tab 5     No facility-administered encounter medications on file as of 5/24/2018.      Review of Systems   Constitutional: Negative for chills and fever.   Respiratory: Negative for shortness of breath.    Cardiovascular: Negative for chest pain, palpitations, leg swelling and PND.   Gastrointestinal: Negative for abdominal pain.   Musculoskeletal: Negative for myalgias.   Neurological: Negative for dizziness, loss of consciousness and headaches.   Endo/Heme/Allergies: Does not bruise/bleed easily.        Objective:   /72   Pulse 80   Ht 1.727 m (5' 8\")   Wt 79.4 kg (175 lb)   SpO2 93%   BMI 26.61 kg/m²      Physical Exam   Constitutional: He is oriented to person, place, and time. He appears well-developed and well-nourished.   HENT:   Head: Normocephalic.   Eyes: EOM are normal.   Neck: Normal range of motion. Neck supple. No JVD present.   Cardiovascular: Normal rate, regular rhythm and normal heart sounds.    Pulmonary/Chest: Effort normal and breath sounds normal. No " respiratory distress. He has no wheezes. He has no rales.   PM in left chest wall.   Abdominal: Soft. Bowel sounds are normal. He exhibits no distension. There is no tenderness.   Musculoskeletal: Normal range of motion. He exhibits no edema.   Neurological: He is alert and oriented to person, place, and time.   Skin: Skin is warm and dry. No rash noted.   Skin changes of chronic venous insufficiency.   Psychiatric: He has a normal mood and affect.     PM is working normally - LV lead is unable to be used. No mode switching episodes.    Lab Results   Component Value Date/Time    SODIUM 142 03/14/2018 03:14 AM    POTASSIUM 3.5 (L) 03/14/2018 03:14 AM    CHLORIDE 105 03/14/2018 03:14 AM    CO2 28 03/14/2018 03:14 AM    GLUCOSE 148 (H) 03/14/2018 03:14 AM    BUN 24 (H) 03/14/2018 03:14 AM    CREATININE 1.46 (H) 03/14/2018 03:14 AM       Assessment:     1. Cardiac pacemaker in situ     2. Coronary artery disease involving native coronary artery of native heart without angina pectoris     3. Ischemic cardiomyopathy     4. Chronic combined systolic and diastolic CHF (congestive heart failure) (HCC)     5. Essential hypertension, benign     6. Mixed hyperlipidemia     7. Chronic obstructive pulmonary disease, unspecified COPD type ([facility])         Medical Decision Making:  Today's Assessment / Status / Plan:     1. CAD/ischemic cardiomyopathy with LVEF 25-30% with CRT-P, but LV lead is unable to be used. The device is working normally.  He remains euvolemic today. To check BMP today.    2. Hypertension, treated and stable.    3. Hyperlipidemia, treated with Lipitor.    4. COPD, treated with inhalers, stable.    5. Hard of hearing.    Same medications for now, including Lasix/KCl doses. Check BMP today. FU with me in 3 months for next PM check and follow-up.    Collaborating MD: Ramos Quiroga Recipients

## 2018-05-24 NOTE — PROGRESS NOTES
Chief Complaint   Patient presents with   • Hypertension     Follow up       Subjective:   Juliocesar Salas is a 83 y.o. male who presents today for two month follow-up for PM check, and recent hospitalization for CHF.    Juliocesar is an 83 year old male with history of CAD/ischemic cardiomyopathy with LVEF 25-30% with CRT-P in March 2017 (with LV lead unable to be implanted due to tortuosity), hypertension, hyperlipidemia, diabetes and COPD, previously followed by Dr. Hernandez, and more recently by Dr. SHAYY Lincoln.    In March 2018, he was hospitalized for CHF; Echocardiogram showed LVEF 25-30%, and he responded well to diuresis. I saw him for follow-up the week after, and medication doses were left the same.    He is here today for follow-up. He has been stable, and breathing is good. No dyspnea on exertion, orthopnea or PND; no chest pain, pressure or discomfort; no palpitations; no dizziness or syncope; no edema. He has not had any blood work done.    Past Medical History:   Diagnosis Date   • Apical mural thrombus following MI ([facility])    • Arthritis    • Asthma    • Cardiac pacemaker in situ 3/2/2017    Pulse generator is a Myoonet model [id number], serial # [id number], RA lead: SJM model #2088TC/46 , serial #[id number], RV lead: SJM model #2088TC/52 , serial #[id number], Dr. Jarrell    • Coronary artery disease involving native coronary artery without angina pectoris 11/25/2014    Severe stenosis of LAD and ramus intermedius with moderate proximal RCA and CFX disease and severe distal disease, PCI not successful and CABG declined by Dr. Mustafa and family.   • Diabetes 1988    oral agents   • Hypertension    • Ischemic cardiomyopathy 03/2018    Echocardiogram with LVEF 25-30%. Mild AS, mild AI, trace MR. Moderate TR. RVSP 60mmHg.   • Non-STEMI (non-ST elevated myocardial infarction) (HCC)    • Rectus sheath hematoma 12/3/2014     Past Surgical History:   Procedure Laterality Date   • PACEMAKER INSERTION  March 2017    [employer]  Allure RF [id number] implanted by Dr. Jarrell. LV lead unable to be placed.   • HIP ARTHROPLASTY TOTAL  3/5/2012    Performed by [facility] at [facility]   • COLONOSCOPY  2/2012   • HIP ARTHROPLASTY TOTAL  2007    right   • KNEE ARTHROPLASTY TOTAL  2002    bilateral   • INGUINAL HERNIA REPAIR  1986    right x2   • CATARACT EXTRACTION WITH IOL       Family History   Problem Relation Age of Onset   • Heart Attack Father 65     MI   • Diabetes     • Heart Disease     • Hypertension       Social History     Social History   • Marital status:      Spouse name: N/A   • Number of children: N/A   • Years of education: N/A     Occupational History   • Not on file.     Social History Main Topics   • Smoking status: Former Smoker     Years: 1.00     Types: Cigars     Start date: 1/1/1993     Quit date: 1/1/1994   • Smokeless tobacco: Former User     Types: Chew     Quit date: 1/1/1994   • Alcohol use Yes      Comment: 2 drinks every 2 weeks   • Drug use: No   • Sexual activity: Not on file     Other Topics Concern   • Not on file     Social History Narrative   • No narrative on file     Allergies   Allergen Reactions   • Nkda [No Known Drug Allergy]      Outpatient Encounter Prescriptions as of 5/24/2018   Medication Sig Dispense Refill   • colchicine (COLCRYS) 0.6 MG Tab 1.2 mg PO x1 then 0.6 mg one hour later 3 Tab 0   • carvedilol (COREG) 12.5 MG Tab Take 1 Tab by mouth 2 times a day, with meals. 60 Tab 3   • furosemide (LASIX) 40 MG Tab Take 1.5 Tabs by mouth every day. 1 TAB IN AM; 1/2 TAB IN AFTERNOON 30 Tab 3   • albuterol 108 (90 Base) MCG/ACT Aero Soln inhalation aerosol Inhale 2 Puffs by mouth every four hours as needed for Shortness of Breath.     • atorvastatin (LIPITOR) 20 MG Tab Take 1 Tab by mouth every day. 30 Tab 11   • lisinopril (PRINIVIL) 2.5 MG Tab Take 2.5 mg by mouth every day.     • glipiZIDE SR (GLUCOTROL) 2.5 MG TABLET SR 24 HR Take 5 mg by mouth every day.     • zolpidem (AMBIEN) 10  "MG Tab Take 10 mg by mouth at bedtime as needed for Sleep.     • tamsulosin (FLOMAX) 0.4 MG capsule Take 0.4 mg by mouth ONE-HALF HOUR AFTER BREAKFAST.     • aspirin EC (ECOTRIN) 81 MG Tablet Delayed Response Take 81 mg by mouth every day.     • budesonide-formoterol (SYMBICORT) 160-4.5 MCG/ACT AERO Inhale 2 Puffs by mouth 2 Times a Day.     • Multiple Vitamin (MULTI-VITAMIN DAILY PO) Take  by mouth.     • vitamin D (CHOLECALCIFEROL) 1000 UNIT TABS Take 2,000 Units by mouth every day.     • potassium citrate SR (UROCIT-K SR) 10 MEQ (1080 MG) TBCR Take 10 mEq by mouth 2 Times a Day.     • nitroglycerin (NITROSTAT) 0.4 MG SL Tab Place 1 Tab under tongue as needed. 25 Tab 5     No facility-administered encounter medications on file as of 5/24/2018.      Review of Systems   Constitutional: Negative for chills and fever.   Respiratory: Negative for shortness of breath.    Cardiovascular: Negative for chest pain, palpitations, leg swelling and PND.   Gastrointestinal: Negative for abdominal pain.   Musculoskeletal: Negative for myalgias.   Neurological: Negative for dizziness, loss of consciousness and headaches.   Endo/Heme/Allergies: Does not bruise/bleed easily.        Objective:   /72   Pulse 80   Ht 1.727 m (5' 8\")   Wt 79.4 kg (175 lb)   SpO2 93%   BMI 26.61 kg/m²     Physical Exam   Constitutional: He is oriented to person, place, and time. He appears well-developed and well-nourished.   HENT:   Head: Normocephalic.   Eyes: EOM are normal.   Neck: Normal range of motion. Neck supple. No JVD present.   Cardiovascular: Normal rate, regular rhythm and normal heart sounds.    Pulmonary/Chest: Effort normal and breath sounds normal. No respiratory distress. He has no wheezes. He has no rales.   PM in left chest wall.   Abdominal: Soft. Bowel sounds are normal. He exhibits no distension. There is no tenderness.   Musculoskeletal: Normal range of motion. He exhibits no edema.   Neurological: He is alert and " oriented to person, place, and time.   Skin: Skin is warm and dry. No rash noted.   Skin changes of chronic venous insufficiency.   Psychiatric: He has a normal mood and affect.     PM is working normally - LV lead is unable to be used. No mode switching episodes.    Lab Results   Component Value Date/Time    SODIUM 142 03/14/2018 03:14 AM    POTASSIUM 3.5 (L) 03/14/2018 03:14 AM    CHLORIDE 105 03/14/2018 03:14 AM    CO2 28 03/14/2018 03:14 AM    GLUCOSE 148 (H) 03/14/2018 03:14 AM    BUN 24 (H) 03/14/2018 03:14 AM    CREATININE 1.46 (H) 03/14/2018 03:14 AM       Assessment:     1. Cardiac pacemaker in situ     2. Coronary artery disease involving native coronary artery of native heart without angina pectoris     3. Ischemic cardiomyopathy     4. Chronic combined systolic and diastolic CHF (congestive heart failure) (HCC)     5. Essential hypertension, benign     6. Mixed hyperlipidemia     7. Chronic obstructive pulmonary disease, unspecified COPD type ([facility])         Medical Decision Making:  Today's Assessment / Status / Plan:     1. CAD/ischemic cardiomyopathy with LVEF 25-30% with CRT-P, but LV lead is unable to be used. The device is working normally.  He remains euvolemic today. To check BMP today.    2. Hypertension, treated and stable.    3. Hyperlipidemia, treated with Lipitor.    4. COPD, treated with inhalers, stable.    5. Hard of hearing.    Same medications for now, including Lasix/KCl doses. Check BMP today. FU with me in 3 months for next PM check and follow-up.    Collaborating MD: Ramos

## 2018-07-13 DIAGNOSIS — R07.89 OTHER CHEST PAIN: ICD-10-CM

## 2018-07-13 RX ORDER — NITROGLYCERIN 0.4 MG/1
0.4 TABLET SUBLINGUAL PRN
Qty: 25 TAB | Refills: 3 | Status: ON HOLD | OUTPATIENT
Start: 2018-07-13 | End: 2019-03-04

## 2018-07-13 RX ORDER — NITROGLYCERIN 0.4 MG/1
0.4 TABLET SUBLINGUAL PRN
Qty: 25 TAB | Refills: 5 | Status: CANCELLED | OUTPATIENT
Start: 2018-07-13

## 2018-08-23 ENCOUNTER — APPOINTMENT (RX ONLY)
Dept: URBAN - METROPOLITAN AREA CLINIC 4 | Facility: CLINIC | Age: 83
Setting detail: DERMATOLOGY
End: 2018-08-23

## 2018-08-23 DIAGNOSIS — D22 MELANOCYTIC NEVI: ICD-10-CM

## 2018-08-23 DIAGNOSIS — L57.0 ACTINIC KERATOSIS: ICD-10-CM

## 2018-08-23 DIAGNOSIS — L81.4 OTHER MELANIN HYPERPIGMENTATION: ICD-10-CM

## 2018-08-23 DIAGNOSIS — L90.5 SCAR CONDITIONS AND FIBROSIS OF SKIN: ICD-10-CM

## 2018-08-23 DIAGNOSIS — L82.1 OTHER SEBORRHEIC KERATOSIS: ICD-10-CM

## 2018-08-23 DIAGNOSIS — L30.4 ERYTHEMA INTERTRIGO: ICD-10-CM

## 2018-08-23 PROBLEM — E13.9 OTHER SPECIFIED DIABETES MELLITUS WITHOUT COMPLICATIONS: Status: ACTIVE | Noted: 2018-08-23

## 2018-08-23 PROBLEM — J45.909 UNSPECIFIED ASTHMA, UNCOMPLICATED: Status: ACTIVE | Noted: 2018-08-23

## 2018-08-23 PROBLEM — I10 ESSENTIAL (PRIMARY) HYPERTENSION: Status: ACTIVE | Noted: 2018-08-23

## 2018-08-23 PROBLEM — D22.5 MELANOCYTIC NEVI OF TRUNK: Status: ACTIVE | Noted: 2018-08-23

## 2018-08-23 PROBLEM — E78.5 HYPERLIPIDEMIA, UNSPECIFIED: Status: ACTIVE | Noted: 2018-08-23

## 2018-08-23 PROCEDURE — ? LIQUID NITROGEN

## 2018-08-23 PROCEDURE — 99213 OFFICE O/P EST LOW 20 MIN: CPT | Mod: 25

## 2018-08-23 PROCEDURE — ? COUNSELING

## 2018-08-23 PROCEDURE — 17003 DESTRUCT PREMALG LES 2-14: CPT

## 2018-08-23 PROCEDURE — ? DIAGNOSIS COMMENT

## 2018-08-23 PROCEDURE — 17000 DESTRUCT PREMALG LESION: CPT

## 2018-08-23 ASSESSMENT — LOCATION SIMPLE DESCRIPTION DERM
LOCATION SIMPLE: LEFT LIP
LOCATION SIMPLE: ABDOMEN
LOCATION SIMPLE: RIGHT FOREARM
LOCATION SIMPLE: POSTERIOR SCALP
LOCATION SIMPLE: RIGHT UPPER BACK
LOCATION SIMPLE: RIGHT EAR
LOCATION SIMPLE: SCALP

## 2018-08-23 ASSESSMENT — LOCATION ZONE DERM
LOCATION ZONE: EAR
LOCATION ZONE: LIP
LOCATION ZONE: SCALP
LOCATION ZONE: ARM
LOCATION ZONE: TRUNK

## 2018-08-23 ASSESSMENT — LOCATION DETAILED DESCRIPTION DERM
LOCATION DETAILED: RIGHT POSTERIOR PARIETAL SCALP
LOCATION DETAILED: EPIGASTRIC SKIN
LOCATION DETAILED: LEFT CENTRAL PARIETAL SCALP
LOCATION DETAILED: RIGHT DISTAL DORSAL FOREARM
LOCATION DETAILED: RIGHT SUPERIOR POSTERIOR HELIX
LOCATION DETAILED: RIGHT MEDIAL UPPER BACK
LOCATION DETAILED: LEFT INFERIOR VERMILION LIP

## 2018-08-23 NOTE — PROCEDURE: COUNSELING
Detail Level: Detailed
Detail Level: Generalized
Patient Specific Counseling (Will Not Stick From Patient To Patient): Use otc Lamisil twice a day.  If it doesn’t improve return to clinic.

## 2018-09-06 ENCOUNTER — NON-PROVIDER VISIT (OUTPATIENT)
Dept: CARDIOLOGY | Facility: PHYSICIAN GROUP | Age: 83
End: 2018-09-06
Payer: MEDICARE

## 2018-09-06 ENCOUNTER — OFFICE VISIT (OUTPATIENT)
Dept: CARDIOLOGY | Facility: PHYSICIAN GROUP | Age: 83
End: 2018-09-06
Payer: MEDICARE

## 2018-09-06 VITALS
BODY MASS INDEX: 26.98 KG/M2 | WEIGHT: 178 LBS | SYSTOLIC BLOOD PRESSURE: 122 MMHG | HEART RATE: 80 BPM | HEIGHT: 68 IN | OXYGEN SATURATION: 94 % | DIASTOLIC BLOOD PRESSURE: 78 MMHG

## 2018-09-06 VITALS
DIASTOLIC BLOOD PRESSURE: 78 MMHG | HEART RATE: 80 BPM | HEIGHT: 68 IN | SYSTOLIC BLOOD PRESSURE: 122 MMHG | OXYGEN SATURATION: 94 % | BODY MASS INDEX: 26.98 KG/M2 | WEIGHT: 178 LBS

## 2018-09-06 DIAGNOSIS — Z95.0 CARDIAC PACEMAKER IN SITU: ICD-10-CM

## 2018-09-06 DIAGNOSIS — I45.10 RIGHT BUNDLE BRANCH BLOCK: ICD-10-CM

## 2018-09-06 DIAGNOSIS — J44.9 CHRONIC OBSTRUCTIVE PULMONARY DISEASE, UNSPECIFIED COPD TYPE (HCC): ICD-10-CM

## 2018-09-06 DIAGNOSIS — I25.10 CORONARY ARTERY DISEASE INVOLVING NATIVE CORONARY ARTERY OF NATIVE HEART WITHOUT ANGINA PECTORIS: ICD-10-CM

## 2018-09-06 DIAGNOSIS — I25.5 ISCHEMIC CARDIOMYOPATHY: ICD-10-CM

## 2018-09-06 DIAGNOSIS — E11.9 CONTROLLED TYPE 2 DIABETES MELLITUS WITHOUT COMPLICATION, WITH LONG-TERM CURRENT USE OF INSULIN (HCC): ICD-10-CM

## 2018-09-06 DIAGNOSIS — Z79.4 CONTROLLED TYPE 2 DIABETES MELLITUS WITHOUT COMPLICATION, WITH LONG-TERM CURRENT USE OF INSULIN (HCC): ICD-10-CM

## 2018-09-06 DIAGNOSIS — I50.42 CHRONIC COMBINED SYSTOLIC AND DIASTOLIC CHF (CONGESTIVE HEART FAILURE) (HCC): ICD-10-CM

## 2018-09-06 DIAGNOSIS — I10 ESSENTIAL HYPERTENSION, BENIGN: ICD-10-CM

## 2018-09-06 DIAGNOSIS — N18.30 CKD (CHRONIC KIDNEY DISEASE) STAGE 3, GFR 30-59 ML/MIN (HCC): ICD-10-CM

## 2018-09-06 DIAGNOSIS — E78.2 MIXED HYPERLIPIDEMIA: ICD-10-CM

## 2018-09-06 PROCEDURE — 93280 PM DEVICE PROGR EVAL DUAL: CPT | Performed by: NURSE PRACTITIONER

## 2018-09-06 PROCEDURE — 99214 OFFICE O/P EST MOD 30 MIN: CPT | Performed by: NURSE PRACTITIONER

## 2018-09-06 ASSESSMENT — ENCOUNTER SYMPTOMS
HEADACHES: 0
SHORTNESS OF BREATH: 0
CHILLS: 0
LOSS OF CONSCIOUSNESS: 0
ABDOMINAL PAIN: 0
PALPITATIONS: 0
PND: 0
DIZZINESS: 0
BRUISES/BLEEDS EASILY: 0
FEVER: 0
MYALGIAS: 0

## 2018-09-06 NOTE — PROGRESS NOTES
Chief Complaint   Patient presents with   • Follow-Up   • Biventricular PM   • Coronary Artery Disease   • Cardiomyopathy (Ischemic)   • HTN (Controlled)   • Hyperlipidemia       Subjective:   Juliocesar Salas is a 83 y.o. male who presents today four month follow-up for PM check, CAD/ischemic cardiomyopathy, HTN, and HLD.    Juliocesar is an 83 year old male with history of CAD/ischemic cardiomyopathy with LVEF 25-30% with CRT-P in March 2017 (with LV lead unable to be implanted due to tortuosity), hypertension, hyperlipidemia, diabetes and COPD, previously followed by Dr. SHAYY Lincoln. In March 2018, he was hospitalized for CHF; Echocardiogram was stable at LVEF 25-30%, and he responded well to diuresis.     He is here today for four month follow-up. He has been stable, and breathing is good. His wife is giving him Lasix 40mg once daily every day, and this seems to keep things stable.  No dyspnea on exertion, orthopnea or PND; no chest pain, pressure or discomfort; no palpitations; no dizziness or syncope; no edema at all. He has had a good summer.    Past Medical History:   Diagnosis Date   • Apical mural thrombus following MI ([facility])    • Arthritis    • Asthma    • Cardiac pacemaker in situ 3/2/2017    Pulse generator is a SJM model [id number], serial # [id number], RA lead: SJM model #2088TC/46 , serial #[id number], RV lead: SJM model #2088TC/52 , serial #[id number], Dr. Jarrell    • Coronary artery disease involving native coronary artery without angina pectoris 11/25/2014    Severe stenosis of LAD and ramus intermedius with moderate proximal RCA and CFX disease and severe distal disease, PCI not successful and CABG declined by Dr. Mustafa and family.   • Diabetes 1988    oral agents   • Hypertension    • Ischemic cardiomyopathy 03/2018    Echocardiogram with LVEF 25-30%. Mild AS, mild AI, trace MR. Moderate TR. RVSP 60mmHg.   • Non-STEMI (non-ST elevated myocardial infarction) (HCC)    • Rectus sheath hematoma 12/3/2014     Past  Surgical History:   Procedure Laterality Date   • PACEMAKER INSERTION  March 2017    [employer] Allure RF [id number] implanted by Dr. Jarrell. LV lead unable to be placed.   • HIP ARTHROPLASTY TOTAL  3/5/2012    Performed by [facility] at [facility]   • COLONOSCOPY  2/2012   • HIP ARTHROPLASTY TOTAL  2007    right   • KNEE ARTHROPLASTY TOTAL  2002    bilateral   • INGUINAL HERNIA REPAIR  1986    right x2   • CATARACT EXTRACTION WITH IOL       Family History   Problem Relation Age of Onset   • Heart Attack Father 65        MI   • Diabetes Unknown    • Heart Disease Unknown    • Hypertension Unknown      Social History     Social History   • Marital status:      Spouse name: N/A   • Number of children: N/A   • Years of education: N/A     Occupational History   • Not on file.     Social History Main Topics   • Smoking status: Former Smoker     Years: 1.00     Types: Cigars     Start date: 1/1/1993     Quit date: 1/1/1994   • Smokeless tobacco: Former User     Types: Chew     Quit date: 1/1/1994   • Alcohol use Yes      Comment: 2 drinks every 2 weeks   • Drug use: No   • Sexual activity: Not on file     Other Topics Concern   • Not on file     Social History Narrative   • No narrative on file     Allergies   Allergen Reactions   • Nkda [No Known Drug Allergy]      Outpatient Encounter Prescriptions as of 9/6/2018   Medication Sig Dispense Refill   • nitroglycerin (NITROSTAT) 0.4 MG SL Tab Place 1 Tab under tongue as needed. No more than 3 tablets 5 min apart for chest pain.  Call 911 if no relief. 25 Tab 3   • colchicine (COLCRYS) 0.6 MG Tab 1.2 mg PO x1 then 0.6 mg one hour later 3 Tab 0   • carvedilol (COREG) 12.5 MG Tab Take 1 Tab by mouth 2 times a day, with meals. 60 Tab 3   • furosemide (LASIX) 40 MG Tab Take 1.5 Tabs by mouth every day. 1 TAB IN AM; 1/2 TAB IN AFTERNOON 30 Tab 3   • albuterol 108 (90 Base) MCG/ACT Aero Soln inhalation aerosol Inhale 2 Puffs by mouth every four hours as  "needed for Shortness of Breath.     • atorvastatin (LIPITOR) 20 MG Tab Take 1 Tab by mouth every day. 30 Tab 11   • lisinopril (PRINIVIL) 2.5 MG Tab Take 2.5 mg by mouth every day.     • glipiZIDE SR (GLUCOTROL) 2.5 MG TABLET SR 24 HR Take 5 mg by mouth every day.     • zolpidem (AMBIEN) 10 MG Tab Take 10 mg by mouth at bedtime as needed for Sleep.     • tamsulosin (FLOMAX) 0.4 MG capsule Take 0.4 mg by mouth ONE-HALF HOUR AFTER BREAKFAST.     • aspirin EC (ECOTRIN) 81 MG Tablet Delayed Response Take 81 mg by mouth every day.     • budesonide-formoterol (SYMBICORT) 160-4.5 MCG/ACT AERO Inhale 2 Puffs by mouth 2 Times a Day.     • Multiple Vitamin (MULTI-VITAMIN DAILY PO) Take  by mouth.     • vitamin D (CHOLECALCIFEROL) 1000 UNIT TABS Take 2,000 Units by mouth every day.     • potassium citrate SR (UROCIT-K SR) 10 MEQ (1080 MG) TBCR Take 10 mEq by mouth 2 Times a Day.       No facility-administered encounter medications on file as of 9/6/2018.      Review of Systems   Constitutional: Negative for chills and fever.   Respiratory: Negative for shortness of breath.    Cardiovascular: Negative for chest pain, palpitations, leg swelling and PND.   Gastrointestinal: Negative for abdominal pain.   Musculoskeletal: Negative for myalgias.   Neurological: Negative for dizziness, loss of consciousness and headaches.   Endo/Heme/Allergies: Does not bruise/bleed easily.        Objective:   /78   Pulse 80   Ht 1.727 m (5' 8\")   Wt 80.7 kg (178 lb)   SpO2 94%   BMI 27.06 kg/m²     Physical Exam   Constitutional: He is oriented to person, place, and time. He appears well-developed and well-nourished.   HENT:   Head: Normocephalic.   Eyes: EOM are normal.   Neck: Normal range of motion. Neck supple. No JVD present.   Cardiovascular: Normal rate, regular rhythm and normal heart sounds.    Pulmonary/Chest: Effort normal and breath sounds normal. No respiratory distress. He has no wheezes. He has no rales.   PM in left chest " wall.   Abdominal: Soft. Bowel sounds are normal. He exhibits no distension. There is no tenderness.   Musculoskeletal: Normal range of motion. He exhibits no edema.   Neurological: He is alert and oriented to person, place, and time.   Skin: Skin is warm and dry. No rash noted.   Skin changes of chronic venous insufficiency.   Psychiatric: He has a normal mood and affect.     PM is working normally. LV lead is not used. Very short mode switching episodes (longest episode 2 minutes).    LABS AS OF 5/24/2018:  Glucose 181  BUN 35  Creatinine 1.36  GFR 50  Potassium 4.1    Assessment:     1. Cardiac pacemaker in situ     2. Coronary artery disease involving native coronary artery of native heart without angina pectoris     3. Ischemic cardiomyopathy     4. Chronic combined systolic and diastolic CHF (congestive heart failure) ([facility])     5. Essential hypertension, benign     6. Mixed hyperlipidemia     7. CKD (chronic kidney disease) stage 3, GFR 30-59 ml/min     8. Chronic obstructive pulmonary disease, unspecified COPD type ([facility])     9. Controlled type 2 diabetes mellitus without complication, with long-term current use of insulin ([facility])         Medical Decision Making:  Today's Assessment / Status / Plan:     1. History of CAD/ischemic cardiomyopathy with CRT-P, but LV lead is unable to be used. He remains stable on medical therapy including Coreg, ACEI, ASA and Lasix/KCl.    2. Hypertension, treated and stable. BP is good today.    3. Hyperlipidemia, treated with Lipitor.    4. Chronic kidney disease, stable.    5. COPD, treated with inhalers, stable.    6. Diabetes mellitus, treated with oral agents.    He is doing well, and remains stable. Same medications for now. FU with me in 6 months, and he will also see Dr. SHAYY Lincoln. FU sooner if clinical condition changes.    Collaborating MD: Ramos

## 2018-09-06 NOTE — LETTER
[facility] [address]  Phone: [phone]  Fax: [phone]              Juliocesar Salas  10/25/1934    Encounter Date: 9/6/2018    LORNE Astorga          PROGRESS NOTE:  Chief Complaint   Patient presents with   • Follow-Up   • Biventricular PM   • Coronary Artery Disease   • Cardiomyopathy (Ischemic)   • HTN (Controlled)   • Hyperlipidemia       Subjective:   Juliocesar Salas is a 83 y.o. male who presents today four month follow-up for PM check, CAD/ischemic cardiomyopathy, HTN, and HLD.    Juliocesar is an 83 year old male with history of CAD/ischemic cardiomyopathy with LVEF 25-30% with CRT-P in March 2017 (with LV lead unable to be implanted due to tortuosity), hypertension, hyperlipidemia, diabetes and COPD, previously followed by Dr. SHAYY Lincoln. In March 2018, he was hospitalized for CHF; Echocardiogram was stable at LVEF 25-30%, and he responded well to diuresis.     He is here today for four month follow-up. He has been stable, and breathing is good. His wife is giving him Lasix 40mg once daily every day, and this seems to keep things stable.  No dyspnea on exertion, orthopnea or PND; no chest pain, pressure or discomfort; no palpitations; no dizziness or syncope; no edema at all. He has had a good summer.    Past Medical History:   Diagnosis Date   • Apical mural thrombus following MI (HCC)    • Arthritis    • Asthma    • Cardiac pacemaker in situ 3/2/2017    Pulse generator is a SJM model [id number], serial # [id number], RA lead: SJM model #2088TC/46 , serial #[id number], RV lead: SJM model #2088TC/52 , serial #[id number], Dr. Jarrell    • Coronary artery disease involving native coronary artery without angina pectoris 11/25/2014    Severe stenosis of LAD and ramus intermedius with moderate proximal RCA and CFX disease and severe distal disease, PCI not successful and CABG declined by Dr. Mustafa and family.   • Diabetes 1988    oral  agents   • Hypertension    • Ischemic cardiomyopathy 03/2018    Echocardiogram with LVEF 25-30%. Mild AS, mild AI, trace MR. Moderate TR. RVSP 60mmHg.   • Non-STEMI (non-ST elevated myocardial infarction) (HCC)    • Rectus sheath hematoma 12/3/2014     Past Surgical History:   Procedure Laterality Date   • PACEMAKER INSERTION  March 2017    [employer] Allure RF [id number] implanted by Dr. Jarrell. LV lead unable to be placed.   • HIP ARTHROPLASTY TOTAL  3/5/2012    Performed by [facility] at [facility]   • COLONOSCOPY  2/2012   • HIP ARTHROPLASTY TOTAL  2007    right   • KNEE ARTHROPLASTY TOTAL  2002    bilateral   • INGUINAL HERNIA REPAIR  1986    right x2   • CATARACT EXTRACTION WITH IOL       Family History   Problem Relation Age of Onset   • Heart Attack Father 65        MI   • Diabetes Unknown    • Heart Disease Unknown    • Hypertension Unknown      Social History     Social History   • Marital status:      Spouse name: N/A   • Number of children: N/A   • Years of education: N/A     Occupational History   • Not on file.     Social History Main Topics   • Smoking status: Former Smoker     Years: 1.00     Types: Cigars     Start date: 1/1/1993     Quit date: 1/1/1994   • Smokeless tobacco: Former User     Types: Chew     Quit date: 1/1/1994   • Alcohol use Yes      Comment: 2 drinks every 2 weeks   • Drug use: No   • Sexual activity: Not on file     Other Topics Concern   • Not on file     Social History Narrative   • No narrative on file     Allergies   Allergen Reactions   • Nkda [No Known Drug Allergy]      Outpatient Encounter Prescriptions as of 9/6/2018   Medication Sig Dispense Refill   • nitroglycerin (NITROSTAT) 0.4 MG SL Tab Place 1 Tab under tongue as needed. No more than 3 tablets 5 min apart for chest pain.  Call 911 if no relief. 25 Tab 3   • colchicine (COLCRYS) 0.6 MG Tab 1.2 mg PO x1 then 0.6 mg one hour later 3 Tab 0   • carvedilol (COREG) 12.5 MG Tab Take 1 Tab by mouth  "2 times a day, with meals. 60 Tab 3   • furosemide (LASIX) 40 MG Tab Take 1.5 Tabs by mouth every day. 1 TAB IN AM; 1/2 TAB IN AFTERNOON 30 Tab 3   • albuterol 108 (90 Base) MCG/ACT Aero Soln inhalation aerosol Inhale 2 Puffs by mouth every four hours as needed for Shortness of Breath.     • atorvastatin (LIPITOR) 20 MG Tab Take 1 Tab by mouth every day. 30 Tab 11   • lisinopril (PRINIVIL) 2.5 MG Tab Take 2.5 mg by mouth every day.     • glipiZIDE SR (GLUCOTROL) 2.5 MG TABLET SR 24 HR Take 5 mg by mouth every day.     • zolpidem (AMBIEN) 10 MG Tab Take 10 mg by mouth at bedtime as needed for Sleep.     • tamsulosin (FLOMAX) 0.4 MG capsule Take 0.4 mg by mouth ONE-HALF HOUR AFTER BREAKFAST.     • aspirin EC (ECOTRIN) 81 MG Tablet Delayed Response Take 81 mg by mouth every day.     • budesonide-formoterol (SYMBICORT) 160-4.5 MCG/ACT AERO Inhale 2 Puffs by mouth 2 Times a Day.     • Multiple Vitamin (MULTI-VITAMIN DAILY PO) Take  by mouth.     • vitamin D (CHOLECALCIFEROL) 1000 UNIT TABS Take 2,000 Units by mouth every day.     • potassium citrate SR (UROCIT-K SR) 10 MEQ (1080 MG) TBCR Take 10 mEq by mouth 2 Times a Day.       No facility-administered encounter medications on file as of 9/6/2018.      Review of Systems   Constitutional: Negative for chills and fever.   Respiratory: Negative for shortness of breath.    Cardiovascular: Negative for chest pain, palpitations, leg swelling and PND.   Gastrointestinal: Negative for abdominal pain.   Musculoskeletal: Negative for myalgias.   Neurological: Negative for dizziness, loss of consciousness and headaches.   Endo/Heme/Allergies: Does not bruise/bleed easily.        Objective:   /78   Pulse 80   Ht 1.727 m (5' 8\")   Wt 80.7 kg (178 lb)   SpO2 94%   BMI 27.06 kg/m²      Physical Exam   Constitutional: He is oriented to person, place, and time. He appears well-developed and well-nourished.   HENT:   Head: Normocephalic.   Eyes: EOM are normal.   Neck: Normal " range of motion. Neck supple. No JVD present.   Cardiovascular: Normal rate, regular rhythm and normal heart sounds.    Pulmonary/Chest: Effort normal and breath sounds normal. No respiratory distress. He has no wheezes. He has no rales.   PM in left chest wall.   Abdominal: Soft. Bowel sounds are normal. He exhibits no distension. There is no tenderness.   Musculoskeletal: Normal range of motion. He exhibits no edema.   Neurological: He is alert and oriented to person, place, and time.   Skin: Skin is warm and dry. No rash noted.   Skin changes of chronic venous insufficiency.   Psychiatric: He has a normal mood and affect.     PM is working normally. LV lead is not used. Very short mode switching episodes (longest episode 2 minutes).    LABS AS OF 5/24/2018:  Glucose 181  BUN 35  Creatinine 1.36  GFR 50  Potassium 4.1    Assessment:     1. Cardiac pacemaker in situ     2. Coronary artery disease involving native coronary artery of native heart without angina pectoris     3. Ischemic cardiomyopathy     4. Chronic combined systolic and diastolic CHF (congestive heart failure) ([facility])     5. Essential hypertension, benign     6. Mixed hyperlipidemia     7. CKD (chronic kidney disease) stage 3, GFR 30-59 ml/min     8. Chronic obstructive pulmonary disease, unspecified COPD type ([facility])     9. Controlled type 2 diabetes mellitus without complication, with long-term current use of insulin ([facility])         Medical Decision Making:  Today's Assessment / Status / Plan:     1. History of CAD/ischemic cardiomyopathy with CRT-P, but LV lead is unable to be used. He remains stable on medical therapy including Coreg, ACEI, ASA and Lasix/KCl.    2. Hypertension, treated and stable. BP is good today.    3. Hyperlipidemia, treated with Lipitor.    4. Chronic kidney disease, stable.    5. COPD, treated with inhalers, stable.    6. Diabetes mellitus, treated with oral agents.    He is doing well, and remains stable. Same medications for  now. FU with me in 6 months, and he will also see Dr. SHAYY Lincoln. FU sooner if clinical condition changes.    Collaborating MD: Ramos      No Recipients

## 2019-02-27 ENCOUNTER — HOSPITAL ENCOUNTER (OUTPATIENT)
Facility: MEDICAL CENTER | Age: 84
End: 2019-02-27
Payer: MEDICARE

## 2019-02-27 ENCOUNTER — HOSPITAL ENCOUNTER (OUTPATIENT)
Dept: RADIOLOGY | Facility: MEDICAL CENTER | Age: 84
End: 2019-02-27

## 2019-02-27 ENCOUNTER — HOSPITAL ENCOUNTER (INPATIENT)
Facility: MEDICAL CENTER | Age: 84
LOS: 6 days | DRG: 291 | End: 2019-03-05
Attending: HOSPITALIST | Admitting: HOSPITALIST
Payer: MEDICARE

## 2019-02-27 DIAGNOSIS — I50.43 ACUTE ON CHRONIC COMBINED SYSTOLIC (CONGESTIVE) AND DIASTOLIC (CONGESTIVE) HEART FAILURE (HCC): ICD-10-CM

## 2019-02-27 DIAGNOSIS — R41.89 COGNITIVE IMPAIRMENT: ICD-10-CM

## 2019-02-27 DIAGNOSIS — I25.119 CORONARY ARTERY DISEASE INVOLVING NATIVE CORONARY ARTERY OF NATIVE HEART WITH ANGINA PECTORIS (HCC): ICD-10-CM

## 2019-02-27 DIAGNOSIS — I10 ESSENTIAL HYPERTENSION, BENIGN: ICD-10-CM

## 2019-02-27 DIAGNOSIS — I25.10 CORONARY ARTERY DISEASE INVOLVING NATIVE CORONARY ARTERY OF NATIVE HEART WITHOUT ANGINA PECTORIS: ICD-10-CM

## 2019-02-27 DIAGNOSIS — E78.2 MIXED HYPERLIPIDEMIA: ICD-10-CM

## 2019-02-27 DIAGNOSIS — M1A.9XX1 TOPHUS OF TOE CONCURRENT WITH AND DUE TO GOUT: ICD-10-CM

## 2019-02-27 DIAGNOSIS — N40.0 BPH (BENIGN PROSTATIC HYPERPLASIA): ICD-10-CM

## 2019-02-27 DIAGNOSIS — R07.89 OTHER CHEST PAIN: ICD-10-CM

## 2019-02-27 LAB
ALBUMIN SERPL BCP-MCNC: 4.1 G/DL (ref 3.2–4.9)
ALBUMIN/GLOB SERPL: 1.4 G/DL
ALP SERPL-CCNC: 69 U/L (ref 30–99)
ALT SERPL-CCNC: 24 U/L (ref 2–50)
ANION GAP SERPL CALC-SCNC: 8 MMOL/L (ref 0–11.9)
AST SERPL-CCNC: 22 U/L (ref 12–45)
BASOPHILS # BLD AUTO: 0.1 % (ref 0–1.8)
BASOPHILS # BLD: 0.01 K/UL (ref 0–0.12)
BILIRUB SERPL-MCNC: 0.8 MG/DL (ref 0.1–1.5)
BNP SERPL-MCNC: 1499 PG/ML (ref 0–100)
BUN SERPL-MCNC: 27 MG/DL (ref 8–22)
CALCIUM SERPL-MCNC: 8.8 MG/DL (ref 8.4–10.2)
CHLORIDE SERPL-SCNC: 101 MMOL/L (ref 96–112)
CO2 SERPL-SCNC: 29 MMOL/L (ref 20–33)
CREAT SERPL-MCNC: 1.46 MG/DL (ref 0.5–1.4)
EOSINOPHIL # BLD AUTO: 0.16 K/UL (ref 0–0.51)
EOSINOPHIL NFR BLD: 2.2 % (ref 0–6.9)
ERYTHROCYTE [DISTWIDTH] IN BLOOD BY AUTOMATED COUNT: 50.9 FL (ref 35.9–50)
GLOBULIN SER CALC-MCNC: 3 G/DL (ref 1.9–3.5)
GLUCOSE BLD-MCNC: 237 MG/DL (ref 65–99)
GLUCOSE SERPL-MCNC: 200 MG/DL (ref 65–99)
HCT VFR BLD AUTO: 43.3 % (ref 42–52)
HGB BLD-MCNC: 13.6 G/DL (ref 14–18)
IMM GRANULOCYTES # BLD AUTO: 0.03 K/UL (ref 0–0.11)
IMM GRANULOCYTES NFR BLD AUTO: 0.4 % (ref 0–0.9)
LYMPHOCYTES # BLD AUTO: 0.99 K/UL (ref 1–4.8)
LYMPHOCYTES NFR BLD: 13.8 % (ref 22–41)
MAGNESIUM SERPL-MCNC: 2.2 MG/DL (ref 1.5–2.5)
MCH RBC QN AUTO: 32.6 PG (ref 27–33)
MCHC RBC AUTO-ENTMCNC: 31.4 G/DL (ref 33.7–35.3)
MCV RBC AUTO: 103.8 FL (ref 81.4–97.8)
MONOCYTES # BLD AUTO: 0.74 K/UL (ref 0–0.85)
MONOCYTES NFR BLD AUTO: 10.3 % (ref 0–13.4)
NEUTROPHILS # BLD AUTO: 5.24 K/UL (ref 1.82–7.42)
NEUTROPHILS NFR BLD: 73.2 % (ref 44–72)
NRBC # BLD AUTO: 0 K/UL
NRBC BLD-RTO: 0 /100 WBC
PLATELET # BLD AUTO: 186 K/UL (ref 164–446)
PMV BLD AUTO: 10.5 FL (ref 9–12.9)
POTASSIUM SERPL-SCNC: 4 MMOL/L (ref 3.6–5.5)
PROT SERPL-MCNC: 7.1 G/DL (ref 6–8.2)
RBC # BLD AUTO: 4.17 M/UL (ref 4.7–6.1)
SODIUM SERPL-SCNC: 138 MMOL/L (ref 135–145)
TROPONIN I SERPL-MCNC: 0.08 NG/ML (ref 0–0.04)
WBC # BLD AUTO: 7.2 K/UL (ref 4.8–10.8)

## 2019-02-27 PROCEDURE — 770020 HCHG ROOM/CARE - TELE (206)

## 2019-02-27 PROCEDURE — 700111 HCHG RX REV CODE 636 W/ 250 OVERRIDE (IP): Performed by: HOSPITALIST

## 2019-02-27 PROCEDURE — 94760 N-INVAS EAR/PLS OXIMETRY 1: CPT

## 2019-02-27 PROCEDURE — 99223 1ST HOSP IP/OBS HIGH 75: CPT | Mod: AI | Performed by: HOSPITALIST

## 2019-02-27 PROCEDURE — A9270 NON-COVERED ITEM OR SERVICE: HCPCS | Performed by: HOSPITALIST

## 2019-02-27 PROCEDURE — 85025 COMPLETE CBC W/AUTO DIFF WBC: CPT

## 2019-02-27 PROCEDURE — 700102 HCHG RX REV CODE 250 W/ 637 OVERRIDE(OP): Performed by: HOSPITALIST

## 2019-02-27 PROCEDURE — 82962 GLUCOSE BLOOD TEST: CPT

## 2019-02-27 PROCEDURE — 80053 COMPREHEN METABOLIC PANEL: CPT

## 2019-02-27 PROCEDURE — 84484 ASSAY OF TROPONIN QUANT: CPT

## 2019-02-27 PROCEDURE — 83735 ASSAY OF MAGNESIUM: CPT

## 2019-02-27 PROCEDURE — 83880 ASSAY OF NATRIURETIC PEPTIDE: CPT

## 2019-02-27 RX ORDER — AMOXICILLIN 250 MG
2 CAPSULE ORAL 2 TIMES DAILY
Status: DISCONTINUED | OUTPATIENT
Start: 2019-02-27 | End: 2019-03-05 | Stop reason: HOSPADM

## 2019-02-27 RX ORDER — ATORVASTATIN CALCIUM 20 MG/1
20 TABLET, FILM COATED ORAL DAILY
Status: DISCONTINUED | OUTPATIENT
Start: 2019-02-28 | End: 2019-03-05 | Stop reason: HOSPADM

## 2019-02-27 RX ORDER — LISINOPRIL 2.5 MG/1
2.5 TABLET ORAL DAILY
Status: DISCONTINUED | OUTPATIENT
Start: 2019-02-28 | End: 2019-03-01

## 2019-02-27 RX ORDER — CARVEDILOL 6.25 MG/1
12.5 TABLET ORAL 2 TIMES DAILY WITH MEALS
Status: DISCONTINUED | OUTPATIENT
Start: 2019-02-27 | End: 2019-03-05 | Stop reason: HOSPADM

## 2019-02-27 RX ORDER — DEXTROSE MONOHYDRATE 25 G/50ML
25 INJECTION, SOLUTION INTRAVENOUS
Status: DISCONTINUED | OUTPATIENT
Start: 2019-02-27 | End: 2019-03-05 | Stop reason: HOSPADM

## 2019-02-27 RX ORDER — BISACODYL 10 MG
10 SUPPOSITORY, RECTAL RECTAL
Status: DISCONTINUED | OUTPATIENT
Start: 2019-02-27 | End: 2019-03-05 | Stop reason: HOSPADM

## 2019-02-27 RX ORDER — HEPARIN SODIUM 5000 [USP'U]/ML
5000 INJECTION, SOLUTION INTRAVENOUS; SUBCUTANEOUS EVERY 8 HOURS
Status: DISCONTINUED | OUTPATIENT
Start: 2019-02-27 | End: 2019-03-05 | Stop reason: HOSPADM

## 2019-02-27 RX ORDER — TAMSULOSIN HYDROCHLORIDE 0.4 MG/1
0.4 CAPSULE ORAL
Status: DISCONTINUED | OUTPATIENT
Start: 2019-02-28 | End: 2019-03-05 | Stop reason: HOSPADM

## 2019-02-27 RX ORDER — BUDESONIDE AND FORMOTEROL FUMARATE DIHYDRATE 160; 4.5 UG/1; UG/1
2 AEROSOL RESPIRATORY (INHALATION) 2 TIMES DAILY
Status: DISCONTINUED | OUTPATIENT
Start: 2019-02-27 | End: 2019-03-05 | Stop reason: HOSPADM

## 2019-02-27 RX ORDER — ACETAMINOPHEN 325 MG/1
650 TABLET ORAL EVERY 6 HOURS PRN
Status: DISCONTINUED | OUTPATIENT
Start: 2019-02-27 | End: 2019-03-05 | Stop reason: HOSPADM

## 2019-02-27 RX ORDER — ALBUTEROL SULFATE 90 UG/1
2 AEROSOL, METERED RESPIRATORY (INHALATION) EVERY 4 HOURS PRN
Status: DISCONTINUED | OUTPATIENT
Start: 2019-02-27 | End: 2019-03-05 | Stop reason: HOSPADM

## 2019-02-27 RX ORDER — POLYETHYLENE GLYCOL 3350 17 G/17G
1 POWDER, FOR SOLUTION ORAL
Status: DISCONTINUED | OUTPATIENT
Start: 2019-02-27 | End: 2019-03-05 | Stop reason: HOSPADM

## 2019-02-27 RX ORDER — FUROSEMIDE 10 MG/ML
80 INJECTION INTRAMUSCULAR; INTRAVENOUS
Status: DISCONTINUED | OUTPATIENT
Start: 2019-02-27 | End: 2019-02-28

## 2019-02-27 RX ORDER — ZOLPIDEM TARTRATE 5 MG/1
10 TABLET ORAL NIGHTLY PRN
Status: DISCONTINUED | OUTPATIENT
Start: 2019-02-27 | End: 2019-03-01

## 2019-02-27 RX ADMIN — BUDESONIDE AND FORMOTEROL FUMARATE DIHYDRATE 2 PUFF: 160; 4.5 AEROSOL RESPIRATORY (INHALATION) at 20:55

## 2019-02-27 RX ADMIN — HEPARIN SODIUM 5000 UNITS: 5000 INJECTION, SOLUTION INTRAVENOUS; SUBCUTANEOUS at 20:49

## 2019-02-27 RX ADMIN — ZOLPIDEM TARTRATE 10 MG: 5 TABLET ORAL at 20:57

## 2019-02-27 RX ADMIN — INSULIN HUMAN 2 UNITS: 100 INJECTION, SOLUTION PARENTERAL at 20:56

## 2019-02-27 RX ADMIN — FUROSEMIDE 80 MG: 10 INJECTION, SOLUTION INTRAVENOUS at 20:47

## 2019-02-27 ASSESSMENT — COGNITIVE AND FUNCTIONAL STATUS - GENERAL
MOVING TO AND FROM BED TO CHAIR: A LITTLE
CLIMB 3 TO 5 STEPS WITH RAILING: A LITTLE
DAILY ACTIVITIY SCORE: 23
MOVING FROM LYING ON BACK TO SITTING ON SIDE OF FLAT BED: A LITTLE
SUGGESTED CMS G CODE MODIFIER MOBILITY: CJ
STANDING UP FROM CHAIR USING ARMS: A LITTLE
TOILETING: A LITTLE
MOBILITY SCORE: 20
SUGGESTED CMS G CODE MODIFIER DAILY ACTIVITY: CI

## 2019-02-27 ASSESSMENT — ENCOUNTER SYMPTOMS
DOUBLE VISION: 0
WEAKNESS: 1
NERVOUS/ANXIOUS: 0
TREMORS: 0
VOMITING: 0
HEMOPTYSIS: 0
MEMORY LOSS: 1
SENSORY CHANGE: 0
SPUTUM PRODUCTION: 0
DIZZINESS: 0
BLOOD IN STOOL: 0
STRIDOR: 0
EYE PAIN: 0
BLURRED VISION: 0
BACK PAIN: 0
DEPRESSION: 0
CHILLS: 0
MYALGIAS: 0
ORTHOPNEA: 0
SHORTNESS OF BREATH: 1
TINGLING: 0
NAUSEA: 0
COUGH: 0
CONSTIPATION: 0
PND: 0
NECK PAIN: 0
HEADACHES: 0
HEARTBURN: 0
PHOTOPHOBIA: 0
CLAUDICATION: 0
PALPITATIONS: 0
SORE THROAT: 0
FEVER: 0
SPEECH CHANGE: 0

## 2019-02-27 ASSESSMENT — LIFESTYLE VARIABLES
HAVE PEOPLE ANNOYED YOU BY CRITICIZING YOUR DRINKING: NO
ALCOHOL_USE: YES
HAVE YOU EVER FELT YOU SHOULD CUT DOWN ON YOUR DRINKING: NO
AVERAGE NUMBER OF DAYS PER WEEK YOU HAVE A DRINK CONTAINING ALCOHOL: 1
TOTAL SCORE: 0
TOTAL SCORE: 0
ON A TYPICAL DAY WHEN YOU DRINK ALCOHOL HOW MANY DRINKS DO YOU HAVE: 1
CONSUMPTION TOTAL: NEGATIVE
EVER_SMOKED: YES
EVER HAD A DRINK FIRST THING IN THE MORNING TO STEADY YOUR NERVES TO GET RID OF A HANGOVER: NO
TOTAL SCORE: 0
HOW MANY TIMES IN THE PAST YEAR HAVE YOU HAD 5 OR MORE DRINKS IN A DAY: 0
EVER FELT BAD OR GUILTY ABOUT YOUR DRINKING: NO

## 2019-02-27 ASSESSMENT — COPD QUESTIONNAIRES
COPD SCREENING SCORE: 5
HAVE YOU SMOKED AT LEAST 100 CIGARETTES IN YOUR ENTIRE LIFE: NO/DON'T KNOW
DURING THE PAST 4 WEEKS HOW MUCH DID YOU FEEL SHORT OF BREATH: SOME OF THE TIME
DO YOU EVER COUGH UP ANY MUCUS OR PHLEGM?: YES, A FEW DAYS A WEEK OR MONTH
IN THE PAST 12 MONTHS DO YOU DO LESS THAN YOU USED TO BECAUSE OF YOUR BREATHING PROBLEMS: AGREE

## 2019-02-27 ASSESSMENT — PATIENT HEALTH QUESTIONNAIRE - PHQ9
SUM OF ALL RESPONSES TO PHQ9 QUESTIONS 1 AND 2: 0
1. LITTLE INTEREST OR PLEASURE IN DOING THINGS: NOT AT ALL
2. FEELING DOWN, DEPRESSED, IRRITABLE, OR HOPELESS: NOT AT ALL

## 2019-02-27 NOTE — PROGRESS NOTES
Direct admit from [facility], referred by Dr. Flores. For Heart Failure. Admitting MD is Dr. Schwartz. Dr. Lincoln I has agreed to consult and will notify his college at [facility] [employer] that patient is comming. Upon patient's arrival release signed and held orders, page on call hospitalist on call for orders.

## 2019-02-27 NOTE — PROGRESS NOTES
Hx of CAD, HTN, COPD, pacemaker, Ischemic cardiomyopathy , Type II DM, last LVEF of 30%, transferring from [facility] for shortness of breath, leg edema, he doubled his lasix at home ,didn't get better. NO CP, pacemaker normal, and troponin 0.1, BNP:1270, patient is 96% on RA. Spoke to hussain doran who agreed to transfer.

## 2019-02-28 ENCOUNTER — APPOINTMENT (OUTPATIENT)
Dept: CARDIOLOGY | Facility: MEDICAL CENTER | Age: 84
DRG: 291 | End: 2019-02-28
Attending: INTERNAL MEDICINE
Payer: MEDICARE

## 2019-02-28 PROBLEM — Z66 DNR (DO NOT RESUSCITATE): Status: ACTIVE | Noted: 2019-02-28

## 2019-02-28 LAB
ALBUMIN SERPL BCP-MCNC: 3.7 G/DL (ref 3.2–4.9)
ALBUMIN/GLOB SERPL: 1.3 G/DL
ALP SERPL-CCNC: 61 U/L (ref 30–99)
ALT SERPL-CCNC: 23 U/L (ref 2–50)
ANION GAP SERPL CALC-SCNC: 13 MMOL/L (ref 0–11.9)
AST SERPL-CCNC: 25 U/L (ref 12–45)
BASOPHILS # BLD AUTO: 0.3 % (ref 0–1.8)
BASOPHILS # BLD: 0.02 K/UL (ref 0–0.12)
BILIRUB SERPL-MCNC: 1.1 MG/DL (ref 0.1–1.5)
BUN SERPL-MCNC: 22 MG/DL (ref 8–22)
CALCIUM SERPL-MCNC: 9.1 MG/DL (ref 8.4–10.2)
CHLORIDE SERPL-SCNC: 99 MMOL/L (ref 96–112)
CO2 SERPL-SCNC: 26 MMOL/L (ref 20–33)
CREAT SERPL-MCNC: 1.52 MG/DL (ref 0.5–1.4)
EOSINOPHIL # BLD AUTO: 0.2 K/UL (ref 0–0.51)
EOSINOPHIL NFR BLD: 2.9 % (ref 0–6.9)
ERYTHROCYTE [DISTWIDTH] IN BLOOD BY AUTOMATED COUNT: 50.8 FL (ref 35.9–50)
GLOBULIN SER CALC-MCNC: 2.8 G/DL (ref 1.9–3.5)
GLUCOSE BLD-MCNC: 166 MG/DL (ref 65–99)
GLUCOSE BLD-MCNC: 176 MG/DL (ref 65–99)
GLUCOSE BLD-MCNC: 188 MG/DL (ref 65–99)
GLUCOSE BLD-MCNC: 222 MG/DL (ref 65–99)
GLUCOSE SERPL-MCNC: 160 MG/DL (ref 65–99)
HCT VFR BLD AUTO: 43.2 % (ref 42–52)
HGB BLD-MCNC: 13.5 G/DL (ref 14–18)
IMM GRANULOCYTES # BLD AUTO: 0.02 K/UL (ref 0–0.11)
IMM GRANULOCYTES NFR BLD AUTO: 0.3 % (ref 0–0.9)
LV EJECT FRACT MOD 2C 99903: 22.41
LV EJECT FRACT MOD 4C 99902: 40.91
LV EJECT FRACT MOD BP 99901: 31.65
LYMPHOCYTES # BLD AUTO: 1 K/UL (ref 1–4.8)
LYMPHOCYTES NFR BLD: 14.6 % (ref 22–41)
MAGNESIUM SERPL-MCNC: 2.1 MG/DL (ref 1.5–2.5)
MCH RBC QN AUTO: 32.1 PG (ref 27–33)
MCHC RBC AUTO-ENTMCNC: 31.3 G/DL (ref 33.7–35.3)
MCV RBC AUTO: 102.9 FL (ref 81.4–97.8)
MONOCYTES # BLD AUTO: 0.71 K/UL (ref 0–0.85)
MONOCYTES NFR BLD AUTO: 10.4 % (ref 0–13.4)
NEUTROPHILS # BLD AUTO: 4.88 K/UL (ref 1.82–7.42)
NEUTROPHILS NFR BLD: 71.5 % (ref 44–72)
NRBC # BLD AUTO: 0 K/UL
NRBC BLD-RTO: 0 /100 WBC
PLATELET # BLD AUTO: 187 K/UL (ref 164–446)
PMV BLD AUTO: 10.6 FL (ref 9–12.9)
POTASSIUM SERPL-SCNC: 3.4 MMOL/L (ref 3.6–5.5)
PROT SERPL-MCNC: 6.5 G/DL (ref 6–8.2)
RBC # BLD AUTO: 4.2 M/UL (ref 4.7–6.1)
SODIUM SERPL-SCNC: 138 MMOL/L (ref 135–145)
TROPONIN I SERPL-MCNC: 0.08 NG/ML (ref 0–0.04)
WBC # BLD AUTO: 6.8 K/UL (ref 4.8–10.8)

## 2019-02-28 PROCEDURE — 84484 ASSAY OF TROPONIN QUANT: CPT

## 2019-02-28 PROCEDURE — 85025 COMPLETE CBC W/AUTO DIFF WBC: CPT

## 2019-02-28 PROCEDURE — 80053 COMPREHEN METABOLIC PANEL: CPT

## 2019-02-28 PROCEDURE — 700102 HCHG RX REV CODE 250 W/ 637 OVERRIDE(OP): Performed by: INTERNAL MEDICINE

## 2019-02-28 PROCEDURE — 700102 HCHG RX REV CODE 250 W/ 637 OVERRIDE(OP): Performed by: HOSPITALIST

## 2019-02-28 PROCEDURE — 700111 HCHG RX REV CODE 636 W/ 250 OVERRIDE (IP): Performed by: HOSPITALIST

## 2019-02-28 PROCEDURE — 99221 1ST HOSP IP/OBS SF/LOW 40: CPT | Performed by: INTERNAL MEDICINE

## 2019-02-28 PROCEDURE — 83735 ASSAY OF MAGNESIUM: CPT

## 2019-02-28 PROCEDURE — 700111 HCHG RX REV CODE 636 W/ 250 OVERRIDE (IP): Performed by: INTERNAL MEDICINE

## 2019-02-28 PROCEDURE — A9270 NON-COVERED ITEM OR SERVICE: HCPCS | Performed by: INTERNAL MEDICINE

## 2019-02-28 PROCEDURE — 99233 SBSQ HOSP IP/OBS HIGH 50: CPT | Mod: 25 | Performed by: INTERNAL MEDICINE

## 2019-02-28 PROCEDURE — 770020 HCHG ROOM/CARE - TELE (206)

## 2019-02-28 PROCEDURE — 93306 TTE W/DOPPLER COMPLETE: CPT

## 2019-02-28 PROCEDURE — 93306 TTE W/DOPPLER COMPLETE: CPT | Mod: 26 | Performed by: INTERNAL MEDICINE

## 2019-02-28 PROCEDURE — A9270 NON-COVERED ITEM OR SERVICE: HCPCS | Performed by: HOSPITALIST

## 2019-02-28 PROCEDURE — 82962 GLUCOSE BLOOD TEST: CPT | Mod: 91

## 2019-02-28 PROCEDURE — 99497 ADVNCD CARE PLAN 30 MIN: CPT | Performed by: INTERNAL MEDICINE

## 2019-02-28 RX ORDER — FUROSEMIDE 10 MG/ML
40 INJECTION INTRAMUSCULAR; INTRAVENOUS
Status: DISCONTINUED | OUTPATIENT
Start: 2019-02-28 | End: 2019-03-01

## 2019-02-28 RX ORDER — POTASSIUM CHLORIDE 20 MEQ/1
20 TABLET, EXTENDED RELEASE ORAL 3 TIMES DAILY
Status: DISCONTINUED | OUTPATIENT
Start: 2019-02-28 | End: 2019-03-01

## 2019-02-28 RX ORDER — SPIRONOLACTONE 25 MG/1
25 TABLET ORAL
Status: DISCONTINUED | OUTPATIENT
Start: 2019-02-28 | End: 2019-03-05 | Stop reason: HOSPADM

## 2019-02-28 RX ORDER — POTASSIUM CHLORIDE 20 MEQ/1
20 TABLET, EXTENDED RELEASE ORAL 2 TIMES DAILY
Status: DISCONTINUED | OUTPATIENT
Start: 2019-02-28 | End: 2019-02-28

## 2019-02-28 RX ADMIN — INSULIN HUMAN 1 UNITS: 100 INJECTION, SOLUTION PARENTERAL at 05:42

## 2019-02-28 RX ADMIN — POTASSIUM CHLORIDE 20 MEQ: 1500 TABLET, EXTENDED RELEASE ORAL at 11:58

## 2019-02-28 RX ADMIN — INSULIN HUMAN 2 UNITS: 100 INJECTION, SOLUTION PARENTERAL at 21:19

## 2019-02-28 RX ADMIN — CARVEDILOL 12.5 MG: 6.25 TABLET, FILM COATED ORAL at 08:09

## 2019-02-28 RX ADMIN — INSULIN HUMAN 1 UNITS: 100 INJECTION, SOLUTION PARENTERAL at 11:56

## 2019-02-28 RX ADMIN — HEPARIN SODIUM 5000 UNITS: 5000 INJECTION, SOLUTION INTRAVENOUS; SUBCUTANEOUS at 05:16

## 2019-02-28 RX ADMIN — ATORVASTATIN CALCIUM 20 MG: 20 TABLET, FILM COATED ORAL at 05:17

## 2019-02-28 RX ADMIN — LISINOPRIL 2.5 MG: 2.5 TABLET ORAL at 05:18

## 2019-02-28 RX ADMIN — FUROSEMIDE 80 MG: 10 INJECTION, SOLUTION INTRAVENOUS at 05:18

## 2019-02-28 RX ADMIN — INSULIN HUMAN 1 UNITS: 100 INJECTION, SOLUTION PARENTERAL at 17:09

## 2019-02-28 RX ADMIN — ASPIRIN 81 MG: 81 TABLET, COATED ORAL at 05:17

## 2019-02-28 RX ADMIN — BUDESONIDE AND FORMOTEROL FUMARATE DIHYDRATE 2 PUFF: 160; 4.5 AEROSOL RESPIRATORY (INHALATION) at 17:10

## 2019-02-28 RX ADMIN — FUROSEMIDE 40 MG: 10 INJECTION, SOLUTION INTRAVENOUS at 16:33

## 2019-02-28 RX ADMIN — POTASSIUM CHLORIDE 20 MEQ: 1500 TABLET, EXTENDED RELEASE ORAL at 17:10

## 2019-02-28 RX ADMIN — TAMSULOSIN HYDROCHLORIDE 0.4 MG: 0.4 CAPSULE ORAL at 09:29

## 2019-02-28 RX ADMIN — HEPARIN SODIUM 5000 UNITS: 5000 INJECTION, SOLUTION INTRAVENOUS; SUBCUTANEOUS at 13:53

## 2019-02-28 RX ADMIN — CARVEDILOL 12.5 MG: 6.25 TABLET, FILM COATED ORAL at 17:10

## 2019-02-28 RX ADMIN — BUDESONIDE AND FORMOTEROL FUMARATE DIHYDRATE 2 PUFF: 160; 4.5 AEROSOL RESPIRATORY (INHALATION) at 05:38

## 2019-02-28 RX ADMIN — SPIRONOLACTONE 25 MG: 25 TABLET, FILM COATED ORAL at 09:29

## 2019-02-28 ASSESSMENT — ENCOUNTER SYMPTOMS
ABDOMINAL PAIN: 0
DIZZINESS: 0
VOMITING: 0
HALLUCINATIONS: 0
FEVER: 0
HEARTBURN: 0
SHORTNESS OF BREATH: 1
FOCAL WEAKNESS: 0
MYALGIAS: 0
DEPRESSION: 0
HEADACHES: 0
WEAKNESS: 1
CHILLS: 0
COUGH: 0
NAUSEA: 0
SORE THROAT: 0
PALPITATIONS: 0
BACK PAIN: 0
DIARRHEA: 0
BLOOD IN STOOL: 0

## 2019-02-28 NOTE — PROGRESS NOTES
Telemetry Shift Summary    Rhythm Afib (V paced) with right BBB  HR Range 80s  Ectopy Rare PVC  Measurements -/0.16/-        Normal Values  Rhythm SR  HR Range    Measurements 0.12-0.20 / 0.06-0.10  / 0.30-0.52

## 2019-02-28 NOTE — FACE TO FACE
Face to Face Supporting Documentation - Home Health    The encounter with this patient was in whole or in part the primary reason for home health admission.    Date of encounter:   Patient:                    MRN:                       YOB: 2019  Juliocesar Salas  [id number]  10/25/1934     Home health to see patient for:  Skilled Nursing care for assessment, interventions & education, Registered dietitian consult, Medical social work consult, Home health aide, Physical Therapy evaluation and treatment and Occupational therapy evaluation and treatment    Skilled need for:  Exacerbation of Chronic Disease State CHF    Skilled nursing interventions to include:  Comment: None    Homebound status evidenced by:  Need the aid of supportive devices such as crutches, canes, wheelchairs or walkers. Leaving home requires a considerable and taxing effort. There is a normal inability to leave the home.    Community Physician to provide follow up care: Rebecca Dixon M.D.     Optional Interventions? No      I certify the face to face encounter for this home health care referral meets the CMS requirements and the encounter/clinical assessment with the patient was, in whole, or in part, for the medical condition(s) listed above, which is the primary reason for home health care. Based on my clinical findings: the service(s) are medically necessary, support the need for home health care, and the homebound criteria are met.  I certify that this patient has had a face to face encounter by myself.  Ashley White D.O. - NPI: [id number]

## 2019-02-28 NOTE — ASSESSMENT & PLAN NOTE
Background history, history of severe stenosis including multiple vessels, has had a history of unsuccessful PCI in the past, and declined coronary bypass in the past as well.  Continue beta-blocker, statin and aspirin  Added spironolactone and ACE this admission  I discussed the case with cardiology, they agree that no intervention is recommended  Discussion with the wife it bedside, she is not ready for hospice but would like home health  POLST completed

## 2019-02-28 NOTE — PROGRESS NOTES
Lap belt being utilized when patient sits in cardiac chair for safety. Patient educated on how to undo it. Patient verbalized and demonstrated how to do it. CNA and RN in room frequently checking in on patient. Will continue to monitor.

## 2019-02-28 NOTE — H&P
Hospital Medicine History & Physical Note    Date of Service  2/27/2019    Primary Care Physician  Rebecca Dixon M.D.    Consultants  Cardiology    Code Status  DNAR/DNI    Chief Complaint  Shortness of breath and worsening leg swelling    History of Presenting Illness  Josue is a very pleasant 84 y.o. male with a past medical history of Multivessel coronary diease (Failed PCI, Declined CABG in the past) HTN, COPD, pacemaker, Ischemic cardiomyopathy , Type II DM, last LVEF of 30%, presented initially to the emergency room at [facility] for complaing of shortness of breath, worsening leg edema despite doubling his home oral dose of Lasix.  As patient's condition did not improve he came to [facility] seeking medical treatment.  However during initial evaluation although patient did not complain of having any chest pain, he did have a mild elevated troponin of 0.1 and an elevated BNP of 1270.  As a result ERP discussed case with cardiology who agreed to consult once patient was transferred to [facility]. Currently patient says he feels well, he does have trouble at times lying flat as he gets short of breath, which then he has to sit up in his recliner to sleep. But otherwise, denies having any fevers/chills, denies chest pain , shortness of breath or nausea/vommiting        Review of Systems  Review of Systems   Constitutional: Positive for malaise/fatigue. Negative for chills and fever.   HENT: Negative for congestion, hearing loss, sore throat and tinnitus.    Eyes: Negative for blurred vision, double vision, photophobia and pain.   Respiratory: Positive for shortness of breath. Negative for cough, hemoptysis, sputum production and stridor.    Cardiovascular: Positive for leg swelling. Negative for chest pain, palpitations, orthopnea, claudication and PND.   Gastrointestinal: Negative for blood in stool, constipation, heartburn, melena, nausea and vomiting.    Genitourinary: Negative for dysuria, frequency and urgency.   Musculoskeletal: Negative for back pain, myalgias and neck pain.   Neurological: Positive for weakness. Negative for dizziness, tingling, tremors, sensory change, speech change and headaches.   Psychiatric/Behavioral: Positive for memory loss. Negative for depression and suicidal ideas. The patient is not nervous/anxious.        Past Medical History  Past Medical History:   Diagnosis Date   • Ischemic cardiomyopathy 03/2018    Echocardiogram with LVEF 25-30%. Mild AS, mild AI, trace MR. Moderate TR. RVSP 60mmHg.   • Cardiac pacemaker in situ 3/2/2017    Pulse generator is a SJM model [id number], serial # [id number], RA lead: SJM model #2088TC/46 , serial #[id number], RV lead: SJM model #2088TC/52 , serial #[id number], Dr. Jarrell    • Rectus sheath hematoma 12/3/2014   • Coronary artery disease involving native coronary artery without angina pectoris 11/25/2014    Severe stenosis of LAD and ramus intermedius with moderate proximal RCA and CFX disease and severe distal disease, PCI not successful and CABG declined by Dr. Mustafa and family.   • Diabetes 1988    oral agents   • Apical mural thrombus following MI (HCC)    • Arthritis    • Asthma    • Hypertension    • Non-STEMI (non-ST elevated myocardial infarction) (HCC)        Surgical History   has a past surgical history that includes knee arthroplasty total (2002); hip arthroplasty total (2007); colonoscopy (2/2012); inguinal hernia repair (1986); hip arthroplasty total (3/5/2012); cataract extraction with iol; and pacemaker insertion (March 2017).    Family History  family history includes Diabetes in his unknown relative; Heart Attack (age of onset: 65) in his father; Heart Disease in his unknown relative; Hypertension in his unknown relative.    Social History   reports that he quit smoking about 25 years ago. His smoking use included Cigars. He started smoking about 26 years ago. He quit after 1.00 year of  use. He quit smokeless tobacco use about 25 years ago. His smokeless tobacco use included Chew. He reports that he drinks alcohol. He reports that he does not use drugs.    Allergies  Allergies   Allergen Reactions   • Nkda [No Known Drug Allergy]        Medications  Prior to Admission medications    Medication Sig Start Date End Date Taking? Authorizing Provider   nitroglycerin (NITROSTAT) 0.4 MG SL Tab Place 1 Tab under tongue as needed. No more than 3 tablets 5 min apart for chest pain.  Call 911 if no relief. 7/13/18   LORNE Astorga   colchicine (COLCRYS) 0.6 MG Tab 1.2 mg PO x1 then 0.6 mg one hour later 3/21/18   Jonathan Nagel P.A.-C.   carvedilol (COREG) 12.5 MG Tab Take 1 Tab by mouth 2 times a day, with meals. 3/15/18   Alessia Pan M.D.   furosemide (LASIX) 40 MG Tab Take 1.5 Tabs by mouth every day. 1 TAB IN AM; 1/2 TAB IN AFTERNOON 3/15/18   Alessia Pan M.D.   albuterol 108 (90 Base) MCG/ACT Aero Soln inhalation aerosol Inhale 2 Puffs by mouth every four hours as needed for Shortness of Breath.    Physician Outpatient   atorvastatin (LIPITOR) 20 MG Tab Take 1 Tab by mouth every day. 4/3/17   Daniel Hernandez M.D.   lisinopril (PRINIVIL) 2.5 MG Tab Take 2.5 mg by mouth every day. 2/15/17   Physician Outpatient   glipiZIDE SR (GLUCOTROL) 2.5 MG TABLET SR 24 HR Take 5 mg by mouth every day. 2/15/17   Physician Outpatient   zolpidem (AMBIEN) 10 MG Tab Take 10 mg by mouth at bedtime as needed for Sleep.    Physician Outpatient   tamsulosin (FLOMAX) 0.4 MG capsule Take 0.4 mg by mouth ONE-HALF HOUR AFTER BREAKFAST.    Physician Outpatient   aspirin EC (ECOTRIN) 81 MG Tablet Delayed Response Take 81 mg by mouth every day.    Physician Outpatient   budesonide-formoterol (SYMBICORT) 160-4.5 MCG/ACT AERO Inhale 2 Puffs by mouth 2 Times a Day.    Physician Outpatient   Multiple Vitamin (MULTI-VITAMIN DAILY PO) Take  by mouth.    Physician Outpatient   vitamin D (CHOLECALCIFEROL) 1000 UNIT TABS Take  2,000 Units by mouth every day.    Physician Outpatient   potassium citrate SR (UROCIT-K SR) 10 MEQ (1080 MG) TBCR Take 10 mEq by mouth 2 Times a Day.    Physician Outpatient       Physical Exam     Physical Exam   Constitutional: He is oriented to person, place, and time. He appears well-developed and well-nourished. No distress.   HENT:   Head: Normocephalic and atraumatic.   Mouth/Throat: No oropharyngeal exudate.   Eyes: Pupils are equal, round, and reactive to light. Conjunctivae are normal. Right eye exhibits no discharge. No scleral icterus.   Neck: Neck supple. No JVD present. No thyromegaly present.   Cardiovascular: Normal rate and intact distal pulses.    No murmur heard.  Pulses:       Dorsalis pedis pulses are 2+ on the right side, and 2+ on the left side.   Cap refill < 3 s   Pulmonary/Chest: Effort normal. No stridor. No respiratory distress. He has no wheezes. He has rales.   Bibasilar rales   Abdominal: Soft. Bowel sounds are normal. He exhibits no distension. There is no tenderness. There is no rebound.   Musculoskeletal: Normal range of motion. He exhibits edema (+3 pitting edema bilateral lower extremities).   Neurological: He is alert and oriented to person, place, and time. No cranial nerve deficit.   Skin: Skin is warm and dry. He is not diaphoretic. No erythema.   Psychiatric: He has a normal mood and affect. His behavior is normal. Thought content normal.   Nursing note and vitals reviewed.      Laboratory:          No results for input(s): ALTSGPT, ASTSGOT, ALKPHOSPHAT, TBILIRUBIN, DBILIRUBIN, GAMMAGT, AMYLASE, LIPASE, ALB, PREALBUMIN, GLUCOSE in the last 72 hours.            Urinalysis:          Imaging:  No orders to display       Assessment/Plan:  I anticipate this patient will require at least two midnights for appropriate medical management, necessitating inpatient admission.    * Acute on chronic combined systolic (congestive) and diastolic (congestive) heart failure (HCC)- (present  on admission)   Assessment & Plan    Failed outpatient therapy, despite doubling on his PO lasix at home.   IV Lasix 80mg BID started.  UOP, Daily weights, Low sodium diet, fluid restriction to 1200 cc daily  Echocardiogram ordered and pending  Outlying CXR grey for pulmonary edema.  Radiology notified awaiting for recommendations     Essential hypertension, benign- (present on admission)   Assessment & Plan    Well-controlled resume home dose of lisinopril and carvedilol     Coronary artery disease involving native coronary artery without angina pectoris- (present on admission)   Assessment & Plan    Background history, history of severe stenosis including multiple vessels, has had a history of unsuccessful PCI in the past, and declined coronary bypass in the past as well.  Chest pain  Resume beta-blocker, statin and aspirin for cardiac protective measures.     Mixed hyperlipidemia- (present on admission)   Assessment & Plan    Resume home dose of atorvastatin     CKD (chronic kidney disease) stage 3, GFR 30-59 ml/min ([facility])- (present on admission)   Assessment & Plan    Renal functions within baseline, his current creatinine was 1.54  We will continue to monitor daily BMP for any acute changes,  We will avoid NSAIDs and nephrotoxins     Elevated troponin- (present on admission)   Assessment & Plan    Elevated troponins at outlying facility 0.10  Most likely due to demand ischemia, based on background information patient does have significant coronary artery disease that was previously not amendable to PCI, patient family have declined in the past for coronary arterial bypass.  We will maintain aspirin statin and beta-blocker for cardiac protective measures at this time.  Continue to trend serial troponins.     COPD (chronic obstructive pulmonary disease) (CMS-[facility])- (present on admission)   Assessment & Plan    Not in acute exacerbation  Resume home inhalers including Symbicort, as needed albuterol  Continue RT  protocol, duo nebs, Pep therapy if warranted, and incentive spirometry.        Diabetes type 2, controlled (CMS-[facility])- (present on admission)   Assessment & Plan    Hold home dose of glipizide, last A1c was 7.3  Continue Insulin-sliding scale, accu-checks and hypoglycemia protocol.         BPH (benign prostatic hyperplasia)- (present on admission)   Assessment & Plan    Continue with home dose of Flomax         VTE prophylaxis: Prophylaxis: sc heparin

## 2019-02-28 NOTE — ASSESSMENT & PLAN NOTE
Elevated troponins at outlying facility 0.10  Most likely due to demand ischemia, based on background of significant coronary artery disease that was previously not amendable to PCI, patient family have declined in the past for coronary arterial bypass.  We will maintain aspirin statin and beta-blocker for cardiac protective measures at this time.  Continue to trend serial troponins.  Discussed with cardiology, med management only

## 2019-02-28 NOTE — PROGRESS NOTES
Telemetry Shift Summary    Rhythm AV Paced Underlying Afib  HR Range 80s-100s  Ectopy Occasional PVC, Rare Couplets, [place], 11 beats VTach  Measurements -/0.14/0.38        Normal Values  Rhythm SR  HR Range    Measurements 0.12-0.20 / 0.06-0.10  / 0.30-0.52

## 2019-02-28 NOTE — ASSESSMENT & PLAN NOTE
Hold home dose of glipizide, last A1c was 7.3  Continue Insulin-sliding scale, accu-checks and hypoglycemia protocol.

## 2019-02-28 NOTE — CARE PLAN
Problem: Safety  Goal: Will remain free from falls  Outcome: PROGRESSING AS EXPECTED  Pt encouraged to call for assistance as needed. Safety precautions in place. Regular rounding.    Problem: Fluid Volume:  Goal: Will maintain balanced intake and output  Outcome: PROGRESSING AS EXPECTED  Fluid restriction in place. I&O monitored. IV lasix given per MAR.

## 2019-02-28 NOTE — PROGRESS NOTES
Report given to Qing WORTHY. Plan of care discussed. Pt resting in bed with safety precautions in place.

## 2019-02-28 NOTE — FLOWSHEET NOTE
02/27/19 2055   Type of Assessment   Assessment Yes   Patient History   Pulmonary Diagnosis History COPD   Surgical Procedures none   Home O2 No   Home Treatments/Frequency Yes   MDI 1/Frequency Symbicort BID   MDI 2/Frequency Albuterol PRN   COPD Risk Screening   Do you have a history of COPD? Yes   Do you have a Pulmonologist? No   Level Of Consciousness   Level of Consciousness Alert   Respiratory WDL   Respiratory (WDL) X   Chest Exam   Respiration 18   Pulse 91   Breath Sounds   RUL Breath Sounds Clear   RML Breath Sounds Diminished   RLL Breath Sounds Diminished   ZOILA Breath Sounds Clear   LLL Breath Sounds Diminished   Oximetry   #Pulse Oximetry (Single Determination) Yes   Oxygen   Home O2 Use Prior To Admission? No   Pulse Oximetry 95 %   O2 Daily Delivery Respiratory  Room Air with O2 Available   Room Air Challenge Pass   Protocol Pathways   Protocol Pathways Yes   Bronchodilator Protocol   Med Order With RCP No   Is this an exacerbation of COPD/Asthma? Patient on Home Regimen   Bronchodilator Indications Strong Subjective / Objective Improvement   Breath Sounds Criteria 1    Benefit Criteria 0    Pulse Criteria 0    Respiratory Rate Criteria 1    S.O.B. Criteria 0    Criteria Total 2   Point Values 0-3 - PRN   Bronchodilator Goals/Outcome Patient at Stable Baseline   O2 Protocol   O2 Protocol Indications Room Air SpO2 Less Than 90%   PRN oxygen initiated for: SpO2 89-92% on Room Air   O2 Protocol Goals/Outcome Return to home Oxygen therapy baseline

## 2019-02-28 NOTE — ASSESSMENT & PLAN NOTE
Renal functions within baseline, his current creatinine 1.54  We will continue to monitor daily BMP for any acute changes,  We will avoid NSAIDs and nephrotoxins

## 2019-02-28 NOTE — CONSULTS
DATE OF SERVICE:  02/28/2019    CHIEF COMPLAINT:  Edema.    HISTORY OF PRESENT ILLNESS:  The patient is an 84-year-old gentleman known to   our practice.  He has history of inoperable coronary artery disease and   ischemic cardiomyopathy.  Most recent echocardiogram from March revealed an EF   in the 25-30% range.  He was seen in [place] yesterday in the ER and because   of worsening edema and apparently some chest pain, was sent here.  The patient   is an unusually poor historian and does not recall the events leading up to   his transfer here yesterday.  He denies any chest pain and he states his   breathing has not been much different.  He does know, however, he is in [place].    The patient cannot really give no further details regarding his recent past   medical history.  According to records, he complained of increasing shortness   of breath at [facility] and had worsening edema despite increasing his Lasix   dose.  As noted above, the patient underwent angiography in November,2014 and was found   to have 3-vessel disease that was not amenable to coronary intervention or   bypass graft surgery.  He had a permanent pacemaker implanted in 03/2017.  At   that time, attempts were made to place a biventricular device; however, the   LV lead could not be placed due to tortuosity of the coronary veins.    MEDICATIONS ON ADMISSION:  Carvedilol 12.5 mg daily, atorvastatin 20 mg a day,   colchicine 0.6 mg daily, furosemide 40 mg tablets 1 tablet in the morning and   1/2 in the afternoon, nitroglycerin as needed.    ILLNESS:  Coronary artery disease, ischemic cardiomyopathy, sick sinus   syndrome, deafness, diabetes, COPD, mixed hyperlipidemia, BPH, chronic right   bundle-branch block.    SOCIAL HISTORY:  Patient is  and lives in [place].  He is a nonsmoker.    REVIEW OF SYSTEMS:  NEUROLOGIC:  Denies any stroke.  ENT:  Patient has difficult time hearing.  CONSTITUTIONAL:  He does not recall any weight change.  Does not  recall if he   has had fever, he states he feels weak.    RESPIRATORY:  He is short of breath.  He has had some cough.  CARDIAC:  Denies recent chest pain, although the patient is not a very good   historian.  All others are negative,  although the patient cannot give a very   detailed review of systems.  According to the HPI, he has had some memory   loss.      PHYSICAL EXAMINATION:  GENERAL:  Reveals an elderly gentleman, sitting in a chair.  He is not   tachypneic.  He is not on supplemental oxygen.  VITAL SIGNS:  Blood pressure is 120/95, afebrile, pulse is 90.  HEAD:  Pupils are equal, gaze conjugate.  NECK:  There is JVD present.  There is no carotid bruit.  LUNGS:  Reveal rales at the left base.  No wheezes.  HEART:  Regular rate and rhythm.  I do not hear a murmur.  EXTREMITIES:  There is edema up to the top of the thighs bilaterally.    Posterior tibial pulses are difficult to feel to the edema.  SKIN:  Warm and dry.  NEUROLOGIC:  The patient is alert, cooperative and a poor historian.  He is   oriented to place.    PERTINENT LABORATORY:  Potassium is 3.4, GFR is 44, which appears to be   running chronic.  Creatinine 1.52.  Troponin negative x2.  BNP on admission   1199.  Hemoglobin is 13.5.  MCV is 103.  Chest x-ray from [place] is not   available for review.    IMPRESSION:  1.  Systolic heart failure, acute on chronic.  2.  Ischemic cardiomyopathy.  3.  Coronary artery disease.  Patient has known inoperable coronary artery   disease.  4.  Hearing loss.  5.  Severe edema secondary to his heart failure.  6.  Memory loss.  7.  History of diabetes.  8.  DNR status.  This is appropriate.  The patient has inoperable coronary   artery disease.  In reviewing the chart, there had been mentioned of hospice   in the past.  I discussed this situation with the hospitalist.  Patient will   be seen by palliative care team as his prognosis is obviously quite poor.    9. CKD III  PLAN:  Patient has been started on IV  diuretics.  He should be on   spironolactone.  Lisinopril has appropriately been started.  We will replete   his potassium and consider giving him a dose of metolazone tomorrow.    According to nursing staff, he had an excellent diuresis overnight.       ____________________________________     MD KEVIN LOGAN / JADEN    DD:  02/28/2019 08:01:24  DT:  02/28/2019 10:32:31    D#:  [id number]  Job#:  [id number]

## 2019-02-28 NOTE — ASSESSMENT & PLAN NOTE
Not in acute exacerbation  Resume home inhalers including Symbicort, as needed albuterol  Continue RT protocol, duo nebs, Pep therapy if warranted, and incentive spirometry.

## 2019-02-28 NOTE — PROGRESS NOTES
Tried to get ahold of wife to ask her about medications, but she didn't answer her phone. A message was left.

## 2019-02-28 NOTE — PROGRESS NOTES
Pt incontinent of urine. Pt oriented to self only at this time. Pt getting out of bed and setting off bed alarm continuously.

## 2019-02-28 NOTE — PROGRESS NOTES
Report received from Ronan WORTHY. Plan of care discussed. Patient is sitting up in cardiac chair with no additional needs at this time.

## 2019-02-28 NOTE — ASSESSMENT & PLAN NOTE
Failed outpatient therapy, despite doubling on his PO lasix at home.   Lasix 40 IV given this morning, change to p.o. twice daily tomorrow  UOP, Daily weights, Low sodium diet, fluid restriction to 1500 cc daily  Echocardiogram w no change: EF 25-%, PAH 50  He is on room air  Spironolactone  Lisinopril 5  Coreg 12.5  Lasix 40 PO BID  Atorva 20  Nitro PRN

## 2019-02-28 NOTE — PROGRESS NOTES
Bedside report received from Sowmya WORTHY. Plan of care discussed. Pt resting in bed with safety precautions in place.

## 2019-02-28 NOTE — PROGRESS NOTES
Pt arrived to unit via gurney. Ambulated from rney to bed, SBA assist. Tele monitor applied, vitals taken. Pt assessed. A&O x4.     Pt is unaware of what medications he takes at home. Pt states his wife knows his medications and will be in soon. Holding medications till medication rec can be completed.

## 2019-02-28 NOTE — CONSULTS
Reason for PC Consult: Advance Care Planning    Consulted by:Ashley White D.O.     Assessment:  General: 84 yom admitted for heart failure. Pt transferred from [place]. Pt complaining of increased BLL edema, dyspnea, and chest pain.  Pt is a poor historian with a hx of dementia. EF - 25 - 35%. Angiography in 2014 found 3-vessel disease and bypass graft surgery was refused by pt. 3/17 pacemaker implanted. PMH:  Ischemic cardiomyopathy, pacemaker, CAD, diabetes, HTN, Non-STEMI.     Dyspnea: No- RA, Resp 20. 94%  Last BM: 02/26/19 (per pt)-    Pain: No- Per RN Assessment  Depression: Mood appropriate for situation-    Dementia: Yes;Alzheimer's 7, A    Spiritual:  Is Yazdanism or spirituality important for coping with this illness? No-    Has a  or spiritual provider visit been requested? No    Palliative Performance Scale: 40%    Advance Directive: Advance Directive- Statement of Desires 2, 3, and 5. Pt wishes to be kept comfortable if in an irreversible coma/brain death/terminal conditon in which all treatment options have been exhausted.   DPOA: Yes- Spouse, Karma Salas [phone], and alternate DPOA, Katina Salas [phone].  POLST: Yes- POLST completed during this encounter - DNR, selective treatments, and no artificial nutrition/feeding tube. POLST signed by Dr. White and copy of POLST scanned into pt's EPIC chart. Original POLST given to BS RN to attach to pt's hard chart and to be given to Karma upon [facility] dc.     Code Status: DNAR/DNI - PC RN confirmed pt's code status of DNAR/DNI with spouse, Karma.     Outcome:  PC RN met with pt and pt's spouse, Karma, at pt's bedside. PC RN introduced self and the role of Palliative Care. Pt has a hx of dementia. Pt confused and falling asleep on and off during PC RN consult. Pt extremely [place]. Karma reports that both she and pt live in [place], [place]. Per Karma, pt has become increasingly confused and short of breath over the past few months. Pt is bladder  incontinent and wear depends. Pt uses a walker at home. Pt is not oxygen dependent.      PC RN discussed Karma's understanding of pt's overall clinical picture, reason for pt's hospitalization, and hospital course. Karma able to describe pt's health history, decline, and hospital course. Pt has an advance directive in place. PC RN discussed disease progression of HF, GOC, POLST, and hospice services/philosophy. Karma is aware that pt is declining and showing disease progression. Pt expressed a desire to go home as soon as he is able. Pt was previously on hospice approximately 4 years ago for about 2 months. Pt graduated from hospice as his symptoms improved. PC RN discussed pt's decline and appropriateness for hospice. Karma declined hospice at this time, but would like pt to return home with home health services. PC RN discussed that pt could transition from home health to hospice when Karma/pt are ready for hospice. Karma completed a POLST form - DNR, selective treatments, and no artificial nutrition/feeding tube. PC RN provided emotional support, active listening, and validation of feelings during consult. PC RN provided Karma with PC business card for any follow up questions.  All questions were answered.     Updated: Dr. White, BS RN, SW, Palliative    Plan: Home with HH, then transition to hospice.     Recommendations: I do not recommend an ethics or hospice consult at this time because spouse/Karma declined hospice and wishes to pursue HH. .    Thank you for allowing Palliative Care to participate in this patient's care. Please feel free to call [phone] with any questions or concerns.

## 2019-02-28 NOTE — PROGRESS NOTES
[facility] Medicine Daily Progress Note    Date of Service  2/28/2019    Chief Complaint  84 y.o. male admitted 2/27/2019 with SOB, leg swelling    Hospital Course    hx of CAD s/p stent, residual disease, not a candidate for CABG, systolic HF due to ischemic CM w EF of ~30%, HLD, COPD w remote tobacco admitted for SOB and LE swelling      Interval Problem Update  2/28: Tolerating lasix, discussed w cardiology, med management only.  Discussed with wife rtudi and palliative care re: goals of care.  Not ready for hospice, they confirm DNR, want home health.  Polst completed    Consultants/Specialty  Palliative Care  Dr. Bolton- Cardiology    Code Status  DNR/DNI    Disposition  Home health (ordered)    Review of Systems  Review of Systems   Constitutional: Negative for chills, fever and malaise/fatigue.   HENT: Negative for sore throat.    Respiratory: Positive for shortness of breath. Negative for cough.    Cardiovascular: Positive for leg swelling. Negative for chest pain and palpitations.   Gastrointestinal: Negative for abdominal pain, blood in stool, diarrhea, heartburn, nausea and vomiting.   Genitourinary: Negative for dysuria and frequency.   Musculoskeletal: Negative for back pain and myalgias.   Neurological: Positive for weakness. Negative for dizziness, focal weakness and headaches.   Psychiatric/Behavioral: Negative for depression and hallucinations.   All other systems reviewed and are negative.       Physical Exam  Temp:  [36.3 °C (97.3 °F)-36.6 °C (97.8 °F)] 36.5 °C (97.7 °F)  Pulse:  [78-93] 78  Resp:  [18-20] 18  BP: (112-130)/(65-95) 112/66  SpO2:  [94 %-95 %] 94 %    Physical Exam   Constitutional: He is oriented to person, place, and time. He appears well-developed and well-nourished. No distress.   HENT:   Head: Normocephalic.   Mouth/Throat: No oropharyngeal exudate.   Hearing loss   Eyes: Pupils are equal, round, and reactive to light. Conjunctivae are normal.   Neck: Normal range of motion. No  tracheal deviation present.   Cardiovascular: Normal rate and regular rhythm.    No murmur heard.  Pulmonary/Chest: Effort normal. No respiratory distress. He has no wheezes. He exhibits no tenderness.   Abdominal: Soft. He exhibits no distension. There is no tenderness. There is no guarding.   Musculoskeletal: Normal range of motion. He exhibits edema (2+). He exhibits no tenderness.   Lymphadenopathy:     He has no cervical adenopathy.   Neurological: He is alert and oriented to person, place, and time. No cranial nerve deficit.   Skin: Skin is warm and dry. No rash noted. There is pallor.   Psychiatric: Cognition and memory are impaired. He expresses impulsivity.   Nursing note and vitals reviewed.      Fluids    Intake/Output Summary (Last 24 hours) at 02/28/19 1454  Last data filed at 02/28/19 1400   Gross per 24 hour   Intake              740 ml   Output             2900 ml   Net            -2160 ml       Laboratory  Recent Labs      02/27/19   1843  02/28/19   0421   WBC  7.2  6.8   RBC  4.17*  4.20*   HEMOGLOBIN  13.6*  13.5*   HEMATOCRIT  43.3  43.2   MCV  103.8*  102.9*   MCH  32.6  32.1   MCHC  31.4*  31.3*   RDW  50.9*  50.8*   PLATELETCT  186  187   MPV  10.5  10.6     Recent Labs      02/27/19   1844  02/28/19   0421   SODIUM  138  138   POTASSIUM  4.0  3.4*   CHLORIDE  101  99   CO2  29  26   GLUCOSE  200*  160*   BUN  27*  22   CREATININE  1.46*  1.52*   CALCIUM  8.8  9.1         Recent Labs      02/27/19   1843   BNPBTYPENAT  1499*           Imaging  OUTSIDE IMAGES-DX CHEST   Final Result      EC-ECHOCARDIOGRAM COMPLETE W/O CONT    (Results Pending)        Assessment/Plan  * Acute on chronic combined systolic (congestive) and diastolic (congestive) heart failure (HCC)- (present on admission)   Assessment & Plan    Failed outpatient therapy, despite doubling on his PO lasix at home.   Continue lasix 40 iv bid  UOP, Daily weights, Low sodium diet, fluid restriction to 1200 cc daily  Echocardiogram  pending  He is on room air  Add spironolactone  Lisinopril 2.5  Coreg 12/5  Atorva 20  Nitro PRN     Cardiac pacemaker in situ- (present on admission)   Assessment & Plan    Dr. Jarrell     Essential hypertension, benign- (present on admission)   Assessment & Plan    Well-controlled continue lisinopril and carvedilol  Spironolactone added 25 daily     Coronary artery disease involving native coronary artery without angina pectoris- (present on admission)   Assessment & Plan    Background history, history of severe stenosis including multiple vessels, has had a history of unsuccessful PCI in the past, and declined coronary bypass in the past as well.  Chest pain  Continue beta-blocker, statin and aspirin for cardiac protective measures.  I discussed the case with cardiology, they agree that no intervention is recommended and they recommended palliative care consultation  Discussion with the wife it bedside, she is not ready for hospice but would like home health  POLST completed     Mixed hyperlipidemia- (present on admission)   Assessment & Plan    Atorvastatin     CKD (chronic kidney disease) stage 3, GFR 30-59 ml/min ([facility])- (present on admission)   Assessment & Plan    Renal functions within baseline, his current creatinine was 1.54  We will continue to monitor daily BMP for any acute changes,  We will avoid NSAIDs and nephrotoxins     Elevated troponin- (present on admission)   Assessment & Plan    Elevated troponins at outlying facility 0.10  Most likely due to demand ischemia, based on background of significant coronary artery disease that was previously not amendable to PCI, patient family have declined in the past for coronary arterial bypass.  We will maintain aspirin statin and beta-blocker for cardiac protective measures at this time.  Continue to trend serial troponins.  Discussed with cardiology, med management only     COPD (chronic obstructive pulmonary disease) (CMS-[facility])- (present on admission)    Assessment & Plan    Not in acute exacerbation  Resume home inhalers including Symbicort, as needed albuterol  Continue RT protocol, duo nebs, Pep therapy if warranted, and incentive spirometry.        Diabetes type 2, controlled (CMS-[facility])- (present on admission)   Assessment & Plan    Hold home dose of glipizide, last A1c was 7.3  Continue Insulin-sliding scale, accu-checks and hypoglycemia protocol.         DNR (do not resuscitate)   Assessment & Plan    Confirmed with wife Karma at bedside     BPH (benign prostatic hyperplasia)- (present on admission)   Assessment & Plan    Continue with home dose of Flomax        35 minutes were spent discussing goals of care with family at bedside, this was in addition to the time of the initial visit with the patient for total time of 65min.  Confirmed DNR, not ready for hospice at this time.  All questions answered.  Greater than 50% of this time was at the patient's bedside.    VTE prophylaxis: Heparin

## 2019-03-01 LAB
ALBUMIN SERPL BCP-MCNC: 3.6 G/DL (ref 3.2–4.9)
BASOPHILS # BLD AUTO: 0.2 % (ref 0–1.8)
BASOPHILS # BLD: 0.01 K/UL (ref 0–0.12)
BUN SERPL-MCNC: 25 MG/DL (ref 8–22)
CALCIUM SERPL-MCNC: 8.9 MG/DL (ref 8.4–10.2)
CHLORIDE SERPL-SCNC: 98 MMOL/L (ref 96–112)
CO2 SERPL-SCNC: 26 MMOL/L (ref 20–33)
CREAT SERPL-MCNC: 1.59 MG/DL (ref 0.5–1.4)
EOSINOPHIL # BLD AUTO: 0.11 K/UL (ref 0–0.51)
EOSINOPHIL NFR BLD: 1.7 % (ref 0–6.9)
ERYTHROCYTE [DISTWIDTH] IN BLOOD BY AUTOMATED COUNT: 49.8 FL (ref 35.9–50)
GLUCOSE BLD-MCNC: 154 MG/DL (ref 65–99)
GLUCOSE BLD-MCNC: 164 MG/DL (ref 65–99)
GLUCOSE BLD-MCNC: 231 MG/DL (ref 65–99)
GLUCOSE BLD-MCNC: 254 MG/DL (ref 65–99)
GLUCOSE SERPL-MCNC: 152 MG/DL (ref 65–99)
HCT VFR BLD AUTO: 42.1 % (ref 42–52)
HGB BLD-MCNC: 13.4 G/DL (ref 14–18)
IMM GRANULOCYTES # BLD AUTO: 0.02 K/UL (ref 0–0.11)
IMM GRANULOCYTES NFR BLD AUTO: 0.3 % (ref 0–0.9)
IRON SATN MFR SERPL: 18 % (ref 15–55)
IRON SERPL-MCNC: 68 UG/DL (ref 50–180)
LYMPHOCYTES # BLD AUTO: 0.94 K/UL (ref 1–4.8)
LYMPHOCYTES NFR BLD: 14.6 % (ref 22–41)
MAGNESIUM SERPL-MCNC: 2.1 MG/DL (ref 1.5–2.5)
MCH RBC QN AUTO: 32.5 PG (ref 27–33)
MCHC RBC AUTO-ENTMCNC: 31.8 G/DL (ref 33.7–35.3)
MCV RBC AUTO: 102.2 FL (ref 81.4–97.8)
MONOCYTES # BLD AUTO: 0.69 K/UL (ref 0–0.85)
MONOCYTES NFR BLD AUTO: 10.7 % (ref 0–13.4)
NEUTROPHILS # BLD AUTO: 4.67 K/UL (ref 1.82–7.42)
NEUTROPHILS NFR BLD: 72.5 % (ref 44–72)
NRBC # BLD AUTO: 0 K/UL
NRBC BLD-RTO: 0 /100 WBC
PHOSPHATE SERPL-MCNC: 3.7 MG/DL (ref 2.5–4.5)
PLATELET # BLD AUTO: 195 K/UL (ref 164–446)
PMV BLD AUTO: 10.5 FL (ref 9–12.9)
POTASSIUM SERPL-SCNC: 3.4 MMOL/L (ref 3.6–5.5)
RBC # BLD AUTO: 4.12 M/UL (ref 4.7–6.1)
SODIUM SERPL-SCNC: 137 MMOL/L (ref 135–145)
TIBC SERPL-MCNC: 372 UG/DL (ref 250–450)
WBC # BLD AUTO: 6.4 K/UL (ref 4.8–10.8)

## 2019-03-01 PROCEDURE — 700102 HCHG RX REV CODE 250 W/ 637 OVERRIDE(OP): Performed by: INTERNAL MEDICINE

## 2019-03-01 PROCEDURE — 770020 HCHG ROOM/CARE - TELE (206)

## 2019-03-01 PROCEDURE — 85025 COMPLETE CBC W/AUTO DIFF WBC: CPT

## 2019-03-01 PROCEDURE — 80069 RENAL FUNCTION PANEL: CPT

## 2019-03-01 PROCEDURE — 83735 ASSAY OF MAGNESIUM: CPT

## 2019-03-01 PROCEDURE — 97166 OT EVAL MOD COMPLEX 45 MIN: CPT

## 2019-03-01 PROCEDURE — 82962 GLUCOSE BLOOD TEST: CPT

## 2019-03-01 PROCEDURE — 97162 PT EVAL MOD COMPLEX 30 MIN: CPT

## 2019-03-01 PROCEDURE — 700111 HCHG RX REV CODE 636 W/ 250 OVERRIDE (IP): Performed by: HOSPITALIST

## 2019-03-01 PROCEDURE — 700102 HCHG RX REV CODE 250 W/ 637 OVERRIDE(OP): Performed by: HOSPITALIST

## 2019-03-01 PROCEDURE — 700111 HCHG RX REV CODE 636 W/ 250 OVERRIDE (IP): Performed by: INTERNAL MEDICINE

## 2019-03-01 PROCEDURE — 99232 SBSQ HOSP IP/OBS MODERATE 35: CPT | Performed by: INTERNAL MEDICINE

## 2019-03-01 PROCEDURE — 83540 ASSAY OF IRON: CPT

## 2019-03-01 PROCEDURE — 83550 IRON BINDING TEST: CPT

## 2019-03-01 PROCEDURE — A9270 NON-COVERED ITEM OR SERVICE: HCPCS | Performed by: INTERNAL MEDICINE

## 2019-03-01 PROCEDURE — A9270 NON-COVERED ITEM OR SERVICE: HCPCS | Performed by: HOSPITALIST

## 2019-03-01 RX ORDER — LISINOPRIL 5 MG/1
5 TABLET ORAL DAILY
Status: DISCONTINUED | OUTPATIENT
Start: 2019-03-02 | End: 2019-03-05 | Stop reason: HOSPADM

## 2019-03-01 RX ORDER — FUROSEMIDE 40 MG/1
40 TABLET ORAL
Status: DISCONTINUED | OUTPATIENT
Start: 2019-03-02 | End: 2019-03-02

## 2019-03-01 RX ORDER — ZOLPIDEM TARTRATE 5 MG/1
5 TABLET ORAL NIGHTLY PRN
Status: DISCONTINUED | OUTPATIENT
Start: 2019-03-01 | End: 2019-03-05 | Stop reason: HOSPADM

## 2019-03-01 RX ORDER — POTASSIUM CHLORIDE 20 MEQ/1
40 TABLET, EXTENDED RELEASE ORAL 2 TIMES DAILY
Status: DISCONTINUED | OUTPATIENT
Start: 2019-03-01 | End: 2019-03-03

## 2019-03-01 RX ADMIN — HEPARIN SODIUM 5000 UNITS: 5000 INJECTION, SOLUTION INTRAVENOUS; SUBCUTANEOUS at 21:26

## 2019-03-01 RX ADMIN — STANDARDIZED SENNA CONCENTRATE AND DOCUSATE SODIUM 2 TABLET: 8.6; 5 TABLET, FILM COATED ORAL at 17:34

## 2019-03-01 RX ADMIN — POTASSIUM CHLORIDE 20 MEQ: 1500 TABLET, EXTENDED RELEASE ORAL at 05:55

## 2019-03-01 RX ADMIN — LISINOPRIL 2.5 MG: 2.5 TABLET ORAL at 06:01

## 2019-03-01 RX ADMIN — ACETAMINOPHEN 650 MG: 325 TABLET, FILM COATED ORAL at 13:56

## 2019-03-01 RX ADMIN — HEPARIN SODIUM 5000 UNITS: 5000 INJECTION, SOLUTION INTRAVENOUS; SUBCUTANEOUS at 06:10

## 2019-03-01 RX ADMIN — ASPIRIN 81 MG: 81 TABLET, COATED ORAL at 05:59

## 2019-03-01 RX ADMIN — POTASSIUM CHLORIDE 40 MEQ: 1500 TABLET, EXTENDED RELEASE ORAL at 17:35

## 2019-03-01 RX ADMIN — INSULIN HUMAN 1 UNITS: 100 INJECTION, SOLUTION PARENTERAL at 06:15

## 2019-03-01 RX ADMIN — BUDESONIDE AND FORMOTEROL FUMARATE DIHYDRATE 2 PUFF: 160; 4.5 AEROSOL RESPIRATORY (INHALATION) at 18:33

## 2019-03-01 RX ADMIN — INSULIN HUMAN 3 UNITS: 100 INJECTION, SOLUTION PARENTERAL at 21:25

## 2019-03-01 RX ADMIN — FUROSEMIDE 40 MG: 10 INJECTION, SOLUTION INTRAVENOUS at 06:03

## 2019-03-01 RX ADMIN — INSULIN HUMAN 2 UNITS: 100 INJECTION, SOLUTION PARENTERAL at 11:39

## 2019-03-01 RX ADMIN — STANDARDIZED SENNA CONCENTRATE AND DOCUSATE SODIUM 2 TABLET: 8.6; 5 TABLET, FILM COATED ORAL at 05:59

## 2019-03-01 RX ADMIN — INSULIN HUMAN 1 UNITS: 100 INJECTION, SOLUTION PARENTERAL at 17:29

## 2019-03-01 RX ADMIN — HEPARIN SODIUM 5000 UNITS: 5000 INJECTION, SOLUTION INTRAVENOUS; SUBCUTANEOUS at 13:56

## 2019-03-01 RX ADMIN — SPIRONOLACTONE 25 MG: 25 TABLET, FILM COATED ORAL at 06:01

## 2019-03-01 RX ADMIN — TAMSULOSIN HYDROCHLORIDE 0.4 MG: 0.4 CAPSULE ORAL at 08:18

## 2019-03-01 RX ADMIN — BUDESONIDE AND FORMOTEROL FUMARATE DIHYDRATE 2 PUFF: 160; 4.5 AEROSOL RESPIRATORY (INHALATION) at 06:19

## 2019-03-01 RX ADMIN — ATORVASTATIN CALCIUM 20 MG: 20 TABLET, FILM COATED ORAL at 05:59

## 2019-03-01 ASSESSMENT — ENCOUNTER SYMPTOMS
PALPITATIONS: 0
WHEEZING: 0
SENSORY CHANGE: 0
CHILLS: 0
CONSTIPATION: 0
DIARRHEA: 0
SPEECH CHANGE: 0
ABDOMINAL PAIN: 0
HALLUCINATIONS: 0
SHORTNESS OF BREATH: 1
WEAKNESS: 1
FOCAL WEAKNESS: 0
DEPRESSION: 0
AGITATION: 0
BLOOD IN STOOL: 0
MYALGIAS: 0
COUGH: 0
SHORTNESS OF BREATH: 0
CONSTITUTIONAL NEGATIVE: 1
FEVER: 0
BACK PAIN: 0
NEUROLOGICAL NEGATIVE: 1
DIZZINESS: 0

## 2019-03-01 ASSESSMENT — COGNITIVE AND FUNCTIONAL STATUS - GENERAL
HELP NEEDED FOR BATHING: A LITTLE
DRESSING REGULAR UPPER BODY CLOTHING: A LITTLE
DRESSING REGULAR LOWER BODY CLOTHING: A LITTLE
SUGGESTED CMS G CODE MODIFIER DAILY ACTIVITY: CK
PERSONAL GROOMING: A LITTLE
TOILETING: A LITTLE
DAILY ACTIVITIY SCORE: 19

## 2019-03-01 ASSESSMENT — GAIT ASSESSMENTS
DISTANCE (FEET): 300
GAIT LEVEL OF ASSIST: MINIMAL ASSIST
DEVIATION: DECREASED HEEL STRIKE;DECREASED TOE OFF
ASSISTIVE DEVICE: FRONT WHEEL WALKER

## 2019-03-01 ASSESSMENT — ACTIVITIES OF DAILY LIVING (ADL): TOILETING: INDEPENDENT

## 2019-03-01 NOTE — DISCHARGE PLANNING
Only [facility] that goes to [place] isn't taking patients at this time.   MD notified.

## 2019-03-01 NOTE — PROGRESS NOTES
Cardiology Follow Up Progress Note    Date of Service  3/1/2019    Attending Physician  Ashley White D.O.    Chief Complaint   Edema and breathlessness    HPI  Juliocesar Salas is a 84 y.o. male admitted 2/27/2019 with the above complaints.  He is known to have severe, end-stage ischemic cardiomyopathy, and was admitted with acute on chronic heart failure.  Overnight he has had no VT on the monitor.  Angiography in November 2014, revealed complex coronary disease that was not amenable to surgical intervention.  He has had a marked diuresis overnight and his edema has markedly improved and is now approximately 1+.          Review of Systems  Review of Systems   Constitutional: Negative.    HENT: Positive for hearing loss.    Respiratory: Negative for shortness of breath and wheezing.    Cardiovascular: Positive for leg swelling. Negative for chest pain.   Neurological: Negative.    Psychiatric/Behavioral: Negative for agitation.       Vital signs in last 24 hours  Temp:  [36.3 °C (97.3 °F)-36.7 °C (98.1 °F)] 36.7 °C (98.1 °F)  Pulse:  [78-91] 83  Resp:  [18-20] 20  BP: (105-121)/(61-92) 105/61  SpO2:  [92 %-94 %] 93 %    Physical Exam  Physical Exam   Constitutional: He is oriented to person, place, and time. No distress.   Patient is sleeping supine in bed without supplemental oxygen and no evidence of dyspnea.   HENT:   Head: Normocephalic and atraumatic.   Eyes: Pupils are equal, round, and reactive to light. No scleral icterus.   Neck: JVD present.   Cardiovascular: Normal rate and regular rhythm.    No murmur heard.  Pulmonary/Chest: He has rales.   Rales at the left base   Musculoskeletal: He exhibits edema.   1+ edema, markedly improved compared to yesterday   Neurological: He is oriented to person, place, and time. No cranial nerve deficit.   Skin: Skin is warm and dry.   Psychiatric: He has a normal mood and affect.       Lab Review  Lab Results   Component Value Date/Time    WBC 6.4 03/01/2019 04:30  AM    RBC 4.12 (L) 03/01/2019 04:30 AM    HEMOGLOBIN 13.4 (L) 03/01/2019 04:30 AM    HEMATOCRIT 42.1 03/01/2019 04:30 AM    .2 (H) 03/01/2019 04:30 AM    MCH 32.5 03/01/2019 04:30 AM    MCHC 31.8 (L) 03/01/2019 04:30 AM    MPV 10.5 03/01/2019 04:30 AM      Lab Results   Component Value Date/Time    SODIUM 137 03/01/2019 04:30 AM    POTASSIUM 3.4 (L) 03/01/2019 04:30 AM    CHLORIDE 98 03/01/2019 04:30 AM    CO2 26 03/01/2019 04:30 AM    GLUCOSE 152 (H) 03/01/2019 04:30 AM    BUN 25 (H) 03/01/2019 04:30 AM    CREATININE 1.59 (H) 03/01/2019 04:30 AM      Lab Results   Component Value Date/Time    ASTSGOT 25 02/28/2019 04:21 AM    ALTSGPT 23 02/28/2019 04:21 AM     Lab Results   Component Value Date/Time    CHOLSTRLTOT 108 11/25/2014 11:57 PM    LDL 36 11/25/2014 11:57 PM    HDL 64 11/25/2014 11:57 PM    TRIGLYCERIDE 38 11/25/2014 11:57 PM    TROPONINI 0.08 (H) 02/28/2019 04:21 AM       Recent Labs      02/27/19   1843   BNPBTYPENAT  1499*     Ischemic cardiomyopathy: Patient admitted with acute on chronic heart failure with edema that was worsening and dyspnea.  He had a marked diuresis overnight and this morning is feeling much better..  Renal function is reduced but stable.  He is able to lie in bed sleeping without supplemental oxygen without any dyspnea at all.  Patient was started on spironolactone and lisinopril yesterday which she was on previously.  Recommend change him to oral diuretic.    He has been seen by palliative care.  Withhold metolazone at this time as he has had a marked diuresis without it.  Potentially could be discharged tomorrow.  Cardiology will sign off at this point and return if needed.    Chronic kidney disease stage III: GFR this morning.  Hypokalemia: He was started on spironolactone yesterday and his potassium supplementation was increased today.      Please contact me with any questions.    Venancio Bolton M.D.   Cardiologist, [facility]  [phone]

## 2019-03-01 NOTE — THERAPY
"Occupational Therapy Evaluation completed.   Functional Status:  Pt is an 83 y/o male, admit with CHF, increasing weakness. Pt lives with his wife, who is able to assist after d/c. Pt PLOF- I with AMB ADLS with the use of a FWW. Pt presents with mild confusion, impulsivity with activity, has decreased I with ADLS and functional mobility- Not at baseline. Pt requires Min A to complete ADLS and CGA to complete functional transfers. Pt will benefit from Acute OT services to increase functional I and safety prior to d/c.  Plan of Care: Will benefit from Occupational Therapy 3 times per week  Discharge Recommendations:  Equipment: Will Continue to Assess for Equipment Needs. Post-acute therapy Discharge to home with outpatient or home health for additional skilled therapy services.    See \"Rehab Therapy-Acute\" Patient Summary Report for complete documentation.    "

## 2019-03-01 NOTE — PROGRESS NOTES
Assessment complete, medicated per MAR. Discussed POC, reoriented to year and that wife is not at hospital, and reinforced use of call light, allowed time to ask questions. Pt resting in chair, RR even and unlabored. Pt educated to call for assistance. Declines needs at this time. Safety precautions in place. Chair alarm in use.    2350 pt in bed, RR even and unlabored. Has tried getting out of bed since start of shift at least 10 times. Pt reminded to call for assistance. Bed alarm on.     0330 pt resting in bed, eyes closed, RR even and unlabored.    0600 pt medicated per MAR. Needed to be reoriented to hospital as he thought his wife was in the hallway and had turned on the lights. Bed alarm turned on, reminded to call for assistance.

## 2019-03-01 NOTE — PROGRESS NOTES
7 Beat run of v tach, strip placed in chart. Patient denies any SOB, chest pain or symptoms, will continue to monitor.

## 2019-03-01 NOTE — PROGRESS NOTES
Telemetry Shift Summary    Rhythm SR, V paced on demand  HR Range 's  Ectopy Freq PVC's  Measurements -/0.14/0.36        Normal Values  Rhythm SR  HR Range    Measurements 0.12-0.20 / 0.06-0.10  / 0.30-0.52

## 2019-03-01 NOTE — CARE PLAN
Problem: Infection  Goal: Will remain free from infection  Outcome: PROGRESSING AS EXPECTED  Pt taught signs and symptoms of infection. Instructed on proper hand washing techniques and oral care.    Problem: Mobility  Goal: Risk for activity intolerance will decrease  Outcome: PROGRESSING AS EXPECTED  Pt enouraged to ambulate and perform ADL's independently. Assistive devices available.

## 2019-03-01 NOTE — CARE PLAN
Problem: Bowel/Gastric:  Goal: Normal bowel function is maintained or improved  Outcome: PROGRESSING AS EXPECTED  Patient had large bowel movement today

## 2019-03-01 NOTE — PROGRESS NOTES
"Pt states \" I need to go my ride is down stairs\" Pt reoriented frequently that his wife will be back soon and that he is at the hospital. Chair alarm is on.     Telemetry Shift Summary    Rhythm AFIB V PACED ON DEMAND  HR Range 60-80  Ectopy couplet, trigem, freq PVCs  Measurements -/0.16/-        Normal Values  Rhythm SR  HR Range    Measurements 0.12-0.20 / 0.06-0.10  / 0.30-0.52  "

## 2019-03-01 NOTE — CARE PLAN
Problem: Communication  Goal: The ability to communicate needs accurately and effectively will improve  Outcome: PROGRESSING AS EXPECTED  Patient encouraged to verbalize feelings. Give patient time to talk. Reorient patient to surroundings and situation.    Problem: Safety  Goal: Will remain free from injury  Outcome: PROGRESSING AS EXPECTED  Bed/chair alarm in use. Lap belt in use. Clutter free environment. Call light within reach. Bed locked and in low position. Patient reoriented to environment and situation. Patient educated on safety precautions.

## 2019-03-01 NOTE — PROGRESS NOTES
Telemetry Shift Summary     Rhythm NSR c BBB, 20% v paced  HR Range 79-85  Ectopy frequent/occasional pVC  Measurements .16/.16/.40

## 2019-03-01 NOTE — PROGRESS NOTES
Report given to Mariposa WORTHY. Plan of care discussed. Patient up in cardiac chair with no additional needs at this time.

## 2019-03-01 NOTE — PROGRESS NOTES
[facility] Medicine Daily Progress Note    Date of Service  3/1/2019    Chief Complaint  84 y.o. male admitted 2/27/2019 with SOB, leg swelling    Hospital Course    hx of CAD s/p stent, residual disease, not a candidate for CABG, systolic HF due to ischemic CM w EF of ~30%, HLD, COPD w remote tobacco admitted for SOB and LE swelling      Interval Problem Update  2/28: Tolerating lasix, discussed w cardiology, med management only.  Discussed with wife trudi and palliative care re: goals of care.  Not ready for hospice, they confirm DNR, want home health.  Polst completed  3/1: Edema improved, Discussed w cardiology, change to PO lasix, increase lisin, poss home in AM    Consultants/Specialty  Palliative Care  Dr. Bolton- Cardiology    Code Status  DNR/DNI    Disposition  Home health (ordered)    Review of Systems  Review of Systems   Constitutional: Positive for malaise/fatigue. Negative for chills and fever.   HENT: Positive for hearing loss. Negative for congestion and nosebleeds.    Respiratory: Positive for shortness of breath. Negative for cough.    Cardiovascular: Positive for leg swelling (Improved). Negative for chest pain and palpitations.   Gastrointestinal: Negative for abdominal pain, blood in stool, constipation and diarrhea.   Genitourinary: Negative for dysuria and frequency.   Musculoskeletal: Negative for back pain and myalgias.   Neurological: Positive for weakness. Negative for dizziness, sensory change, speech change and focal weakness.   Psychiatric/Behavioral: Negative for depression and hallucinations.   All other systems reviewed and are negative.       Physical Exam  Temp:  [36.3 °C (97.3 °F)-36.7 °C (98.1 °F)] 36.4 °C (97.5 °F)  Pulse:  [75-91] 75  Resp:  [18-20] 18  BP: ()/(44-92) 91/65  SpO2:  [92 %-94 %] 93 %    Physical Exam   Constitutional: He is oriented to person, place, and time. He appears well-developed and well-nourished. No distress.   Drowsy, nodding off   HENT:   Head:  Normocephalic.   Mouth/Throat: No oropharyngeal exudate.   Hearing loss   Eyes: Pupils are equal, round, and reactive to light. Conjunctivae are normal.   Neck: Normal range of motion. No tracheal deviation present.   Cardiovascular: Normal rate and regular rhythm.    No murmur heard.  Pulmonary/Chest: Effort normal. He has no wheezes. He has no rales. He exhibits no tenderness.   Abdominal: Soft. He exhibits no distension. There is no tenderness. There is no guarding.   Musculoskeletal: Normal range of motion. He exhibits edema (1+). He exhibits no tenderness.   Lymphadenopathy:     He has no cervical adenopathy.   Neurological: He is alert and oriented to person, place, and time. No cranial nerve deficit.   Skin: Skin is warm and dry. No rash noted. There is pallor.   Psychiatric: He is slowed. Cognition and memory are impaired. He expresses impulsivity.   Nursing note and vitals reviewed.      Fluids    Intake/Output Summary (Last 24 hours) at 03/01/19 1457  Last data filed at 03/01/19 1200   Gross per 24 hour   Intake             1160 ml   Output             1755 ml   Net             -595 ml       Laboratory  Recent Labs      02/27/19   1843  02/28/19   0421  03/01/19   0430   WBC  7.2  6.8  6.4   RBC  4.17*  4.20*  4.12*   HEMOGLOBIN  13.6*  13.5*  13.4*   HEMATOCRIT  43.3  43.2  42.1   MCV  103.8*  102.9*  102.2*   MCH  32.6  32.1  32.5   MCHC  31.4*  31.3*  31.8*   RDW  50.9*  50.8*  49.8   PLATELETCT  186  187  195   MPV  10.5  10.6  10.5     Recent Labs      02/27/19   1844  02/28/19   0421  03/01/19   0430   SODIUM  138  138  137   POTASSIUM  4.0  3.4*  3.4*   CHLORIDE  101  99  98   CO2  29  26  26   GLUCOSE  200*  160*  152*   BUN  27*  22  25*   CREATININE  1.46*  1.52*  1.59*   CALCIUM  8.8  9.1  8.9         Recent Labs      02/27/19   1843   BNPBTYPENAT  1499*           Imaging  EC-ECHOCARDIOGRAM COMPLETE W/O CONT   Final Result      OUTSIDE IMAGES-DX CHEST   Final Result           Assessment/Plan  *  Acute on chronic combined systolic (congestive) and diastolic (congestive) heart failure (HCC)- (present on admission)   Assessment & Plan    Failed outpatient therapy, despite doubling on his PO lasix at home.   Lasix 40 IV given this morning, change to p.o. twice daily tomorrow  UOP, Daily weights, Low sodium diet, fluid restriction to 1500 cc daily  Echocardiogram w no change: EF 25-%, PAH 50  He is on room air  Spironolactone  Lisinopril 5  Coreg 12.5  Lasix 40 PO daily, home in AM if diuresis continues  Atorva 20  Nitro PRN     Cardiac pacemaker in situ- (present on admission)   Assessment & Plan    Dr. Jarrell     Essential hypertension, benign- (present on admission)   Assessment & Plan    Well-controlled continue  -lisinopril and carvedilol  -Spironolactone 25 daily     Coronary artery disease involving native coronary artery without angina pectoris- (present on admission)   Assessment & Plan    Background history, history of severe stenosis including multiple vessels, has had a history of unsuccessful PCI in the past, and declined coronary bypass in the past as well.  Continue beta-blocker, statin and aspirin  Added spironolactone and ACE this admission  I discussed the case with cardiology, they agree that no intervention is recommended  Discussion with the wife it bedside, she is not ready for hospice but would like home health  POLST completed     Mixed hyperlipidemia- (present on admission)   Assessment & Plan    Atorvastatin     CKD (chronic kidney disease) stage 3, GFR 30-59 ml/min ([facility])- (present on admission)   Assessment & Plan    Renal functions within baseline, his current creatinine 1.54  We will continue to monitor daily BMP for any acute changes,  We will avoid NSAIDs and nephrotoxins     Elevated troponin- (present on admission)   Assessment & Plan    Elevated troponins at outlying facility 0.10  Most likely due to demand ischemia, based on background of significant coronary artery disease that  was previously not amendable to PCI, patient family have declined in the past for coronary arterial bypass.  We will maintain aspirin statin and beta-blocker for cardiac protective measures at this time.  Continue to trend serial troponins.  Discussed with cardiology, med management only     COPD (chronic obstructive pulmonary disease) (CMS-HCC)- (present on admission)   Assessment & Plan    Not in acute exacerbation  Resume home inhalers including Symbicort, as needed albuterol  Continue RT protocol, duo nebs, Pep therapy if warranted, and incentive spirometry.        Diabetes type 2, controlled (CMS-HCC)- (present on admission)   Assessment & Plan    Hold home dose of glipizide, last A1c was 7.3  Continue Insulin-sliding scale, accu-checks and hypoglycemia protocol.     DNR (do not resuscitate)   Assessment & Plan    Confirmed with wife Karma at bedside     BPH (benign prostatic hyperplasia)- (present on admission)   Assessment & Plan    Continue with home dose of Flomax          VTE prophylaxis: Heparin

## 2019-03-02 PROBLEM — R41.89 COGNITIVE IMPAIRMENT: Status: ACTIVE | Noted: 2019-03-02

## 2019-03-02 LAB
ALBUMIN SERPL BCP-MCNC: 4.1 G/DL (ref 3.2–4.9)
BUN SERPL-MCNC: 31 MG/DL (ref 8–22)
CALCIUM SERPL-MCNC: 9.2 MG/DL (ref 8.4–10.2)
CHLORIDE SERPL-SCNC: 100 MMOL/L (ref 96–112)
CO2 SERPL-SCNC: 27 MMOL/L (ref 20–33)
CREAT SERPL-MCNC: 1.46 MG/DL (ref 0.5–1.4)
GLUCOSE BLD-MCNC: 164 MG/DL (ref 65–99)
GLUCOSE BLD-MCNC: 176 MG/DL (ref 65–99)
GLUCOSE BLD-MCNC: 200 MG/DL (ref 65–99)
GLUCOSE BLD-MCNC: 211 MG/DL (ref 65–99)
GLUCOSE SERPL-MCNC: 251 MG/DL (ref 65–99)
MAGNESIUM SERPL-MCNC: 2 MG/DL (ref 1.5–2.5)
PHOSPHATE SERPL-MCNC: 4.1 MG/DL (ref 2.5–4.5)
POTASSIUM SERPL-SCNC: 4.1 MMOL/L (ref 3.6–5.5)
SODIUM SERPL-SCNC: 136 MMOL/L (ref 135–145)

## 2019-03-02 PROCEDURE — 83735 ASSAY OF MAGNESIUM: CPT

## 2019-03-02 PROCEDURE — A9270 NON-COVERED ITEM OR SERVICE: HCPCS | Performed by: HOSPITALIST

## 2019-03-02 PROCEDURE — 82962 GLUCOSE BLOOD TEST: CPT | Mod: 91

## 2019-03-02 PROCEDURE — 770020 HCHG ROOM/CARE - TELE (206)

## 2019-03-02 PROCEDURE — A9270 NON-COVERED ITEM OR SERVICE: HCPCS | Performed by: INTERNAL MEDICINE

## 2019-03-02 PROCEDURE — 99232 SBSQ HOSP IP/OBS MODERATE 35: CPT | Performed by: INTERNAL MEDICINE

## 2019-03-02 PROCEDURE — 700111 HCHG RX REV CODE 636 W/ 250 OVERRIDE (IP): Performed by: HOSPITALIST

## 2019-03-02 PROCEDURE — 80069 RENAL FUNCTION PANEL: CPT

## 2019-03-02 PROCEDURE — 700102 HCHG RX REV CODE 250 W/ 637 OVERRIDE(OP): Performed by: HOSPITALIST

## 2019-03-02 PROCEDURE — 700102 HCHG RX REV CODE 250 W/ 637 OVERRIDE(OP): Performed by: INTERNAL MEDICINE

## 2019-03-02 RX ORDER — FUROSEMIDE 40 MG/1
40 TABLET ORAL
Status: DISCONTINUED | OUTPATIENT
Start: 2019-03-03 | End: 2019-03-05 | Stop reason: HOSPADM

## 2019-03-02 RX ADMIN — ASPIRIN 81 MG: 81 TABLET, COATED ORAL at 06:13

## 2019-03-02 RX ADMIN — ZOLPIDEM TARTRATE 5 MG: 5 TABLET ORAL at 21:09

## 2019-03-02 RX ADMIN — HEPARIN SODIUM 5000 UNITS: 5000 INJECTION, SOLUTION INTRAVENOUS; SUBCUTANEOUS at 06:14

## 2019-03-02 RX ADMIN — INSULIN HUMAN 2 UNITS: 100 INJECTION, SOLUTION PARENTERAL at 12:23

## 2019-03-02 RX ADMIN — CARVEDILOL 12.5 MG: 6.25 TABLET, FILM COATED ORAL at 08:53

## 2019-03-02 RX ADMIN — FUROSEMIDE 40 MG: 40 TABLET ORAL at 06:13

## 2019-03-02 RX ADMIN — ATORVASTATIN CALCIUM 20 MG: 20 TABLET, FILM COATED ORAL at 06:13

## 2019-03-02 RX ADMIN — CARVEDILOL 12.5 MG: 6.25 TABLET, FILM COATED ORAL at 18:13

## 2019-03-02 RX ADMIN — STANDARDIZED SENNA CONCENTRATE AND DOCUSATE SODIUM 2 TABLET: 8.6; 5 TABLET, FILM COATED ORAL at 06:13

## 2019-03-02 RX ADMIN — STANDARDIZED SENNA CONCENTRATE AND DOCUSATE SODIUM 2 TABLET: 8.6; 5 TABLET, FILM COATED ORAL at 18:13

## 2019-03-02 RX ADMIN — HEPARIN SODIUM 5000 UNITS: 5000 INJECTION, SOLUTION INTRAVENOUS; SUBCUTANEOUS at 16:31

## 2019-03-02 RX ADMIN — INSULIN HUMAN 1 UNITS: 100 INJECTION, SOLUTION PARENTERAL at 06:20

## 2019-03-02 RX ADMIN — ACETAMINOPHEN 650 MG: 325 TABLET, FILM COATED ORAL at 04:01

## 2019-03-02 RX ADMIN — ACETAMINOPHEN 650 MG: 325 TABLET, FILM COATED ORAL at 16:28

## 2019-03-02 RX ADMIN — TAMSULOSIN HYDROCHLORIDE 0.4 MG: 0.4 CAPSULE ORAL at 08:53

## 2019-03-02 RX ADMIN — SPIRONOLACTONE 25 MG: 25 TABLET, FILM COATED ORAL at 06:13

## 2019-03-02 RX ADMIN — BUDESONIDE AND FORMOTEROL FUMARATE DIHYDRATE 2 PUFF: 160; 4.5 AEROSOL RESPIRATORY (INHALATION) at 06:25

## 2019-03-02 RX ADMIN — LISINOPRIL 5 MG: 5 TABLET ORAL at 06:16

## 2019-03-02 RX ADMIN — POTASSIUM CHLORIDE 40 MEQ: 1500 TABLET, EXTENDED RELEASE ORAL at 18:15

## 2019-03-02 RX ADMIN — POTASSIUM CHLORIDE 40 MEQ: 1500 TABLET, EXTENDED RELEASE ORAL at 06:13

## 2019-03-02 RX ADMIN — INSULIN HUMAN 1 UNITS: 100 INJECTION, SOLUTION PARENTERAL at 18:17

## 2019-03-02 RX ADMIN — INSULIN HUMAN 1 UNITS: 100 INJECTION, SOLUTION PARENTERAL at 21:07

## 2019-03-02 RX ADMIN — BUDESONIDE AND FORMOTEROL FUMARATE DIHYDRATE 2 PUFF: 160; 4.5 AEROSOL RESPIRATORY (INHALATION) at 18:21

## 2019-03-02 RX ADMIN — HEPARIN SODIUM 5000 UNITS: 5000 INJECTION, SOLUTION INTRAVENOUS; SUBCUTANEOUS at 21:08

## 2019-03-02 ASSESSMENT — ENCOUNTER SYMPTOMS
ABDOMINAL PAIN: 0
MEMORY LOSS: 1
DIZZINESS: 0
MYALGIAS: 0
SHORTNESS OF BREATH: 0
BACK PAIN: 0
HALLUCINATIONS: 0
CONSTIPATION: 0
FEVER: 0
PALPITATIONS: 0
SPEECH CHANGE: 0
CHILLS: 0
COUGH: 0
WEAKNESS: 1
BLOOD IN STOOL: 0
SENSORY CHANGE: 0
FOCAL WEAKNESS: 0
DEPRESSION: 0
DIARRHEA: 0

## 2019-03-02 NOTE — PROGRESS NOTES
Report received from heena Monge up to the bathroom with the CNA when doing report.      His wife called about 0800 to ask if her  would be coming home as she lives in [place] and its snowing and she doesn't drive in the snow; explained that he wasn't quite ready but would let her know after the doctor rounded.    Morning assessment done about 0850 after assisting pt with his breakfast tray and up to the chair with alarm.  Pt is only oriented to himself and keeps asking when he can get his car to go home.   When asking the orientation question, he thinks it 2009, remberto is president, its kaia time, he is in an institution and no idea why he is in the hospital; then he was upset that no one had asked him about his situation and why he is so confused.       Have had to explain to the patient at least 3 times that he is on a fluid restriction as every time you walk in he ask for coffee or juice.      Dr White rounded about 0930 and was going to talk to  about other discharge options as OT is recommending home health and there is currently not one accepting in [place].

## 2019-03-02 NOTE — DISCHARGE PLANNING
Received Choice form at 0311  Agency/Facility Name: [facility]  Referral sent per Choice form @ [phone]

## 2019-03-02 NOTE — PROGRESS NOTES
Assessment complete, medicated per MAR. Discussed POC and medications, allowed time to ask questions. Pt resting in bed, RR even and unlabored, AAOx2, disoriented to time and place. Pt educated to call for assistance. Declines needs at this time. Safety precautions in place. Bed alarm on.    2345 pt resting in bed, eyes closed, RR even and unlabored.     0400 pt medicated per MAR for pain in hip. Hot pack provided. Pt has been sleeping, no SOB noted while sleeping or while having a conversation. Was able to have a 10 minute conversation without SOB. Declines needs at this time.    0600 pt medicated per MAR. Pain in hip still present but he was able to sleep. Declines needs at this time.

## 2019-03-02 NOTE — PROGRESS NOTES
Telemetry Shift Summary    Rhythm SR, V paced  HR Range 80-90's  Ectopy Occ PVC's, Rare trigem, triplet, couplets  Measurements -/-/-        Normal Values  Rhythm SR  HR Range    Measurements 0.12-0.20 / 0.06-0.10  / 0.30-0.52

## 2019-03-02 NOTE — CARE PLAN
Problem: Safety  Goal: Will remain free from injury    Intervention: Provide assistance with mobility  Pt is only oriented to self and does not reorienting.  Utilizing hourly rounding, providing activity for distraction, chair alarm.        Problem: Knowledge Deficit  Goal: Knowledge of disease process/condition, treatment plan, diagnostic tests, and medications will improve    Intervention: Explain information regarding disease process/condition, treatment plan, diagnostic tests, and medications and document in education  Asking  what other options are available as [facility] available; possible snf or hospice.      Problem: Fluid Volume:  Goal: Will maintain balanced intake and output    Intervention: Monitor, educate, and encourage compliance with therapeutic intake of liquids  Pt is on a fluid restriction and needs to be reminded frequently of this as he ask everyone who assist for more coffee or juice.

## 2019-03-02 NOTE — THERAPY
"Physical Therapy Evaluation completed.   Bed Mobility:     Transfers: Sit to Stand: Stand by Assist  Gait: Level Of Assist: Minimal Assist with Front-Wheel Walker    X 300 ft   Plan of Care: Will benefit from Physical Therapy 3 times per week  Discharge Recommendations: Equipment: No Equipment Needed. Post-acute therapy Discharge to a transitional care facility for continued skilled therapy services.    See \"Rehab Therapy-Acute\" Patient Summary Report for complete documentation.   Pt is an 84 y.o.m. referred to PT secondary to functional decline.  Pt presented to hospital with SOB and dx with CHF exacerbation.  The pt lives with his wife.  He demonstrates confusion, decreased memory, and he is very [place].  The pt is a poor historian, but states he is indep with gait household with spc.  Pt presents with impaired balance, decreased safety judgement, and need for assist with all mobility.  Pt is not safe to d/c home with his wife.  He would benefit from cont skilled PT in acute care setting to improve gait, balance and indep.   "

## 2019-03-02 NOTE — CARE PLAN
Problem: Safety  Goal: Will remain free from falls  Outcome: PROGRESSING AS EXPECTED  Room uncluttered, urinal within reach. Pt educated to call for assistance and to sit at edge of bed before standing. Slipper socks on feet. Bed alarm on.    Problem: Mobility  Goal: Risk for activity intolerance will decrease  Outcome: PROGRESSING AS EXPECTED  Pt enouraged to ambulate and perform ADL's independently. Assistive devices available.

## 2019-03-02 NOTE — PROGRESS NOTES
Bedside report received from Debra WORTHY. Pt resting in cardiac chair, RR even and unlabored. Safety precautions in place, call light within reach. Pt belongings within reach. POC discussed, pain assessed. Pt belongings within reach.

## 2019-03-02 NOTE — DISCHARGE PLANNING
VANESSA attempted to reach pt's spouse, Karma.  VANESSA LM requesting a call back regarding SNF choice.  VANESSA will f/u.    UPDATE:     VANESSA spoke to Karma, she chose [facility], [facility], and [facility]3 [facility].  VANESSA faxed choice to [facility].

## 2019-03-02 NOTE — DISCHARGE PLANNING
Agency/Facility Name: [facility]  Spoke To: Kathrin  Outcome: Patient accepted. However, no beds today.

## 2019-03-03 PROBLEM — B37.2 CANDIDAL DERMATITIS: Status: ACTIVE | Noted: 2019-03-03

## 2019-03-03 LAB
ALBUMIN SERPL BCP-MCNC: 3.6 G/DL (ref 3.2–4.9)
BUN SERPL-MCNC: 26 MG/DL (ref 8–22)
CALCIUM SERPL-MCNC: 9.2 MG/DL (ref 8.4–10.2)
CHLORIDE SERPL-SCNC: 105 MMOL/L (ref 96–112)
CO2 SERPL-SCNC: 23 MMOL/L (ref 20–33)
CREAT SERPL-MCNC: 1.49 MG/DL (ref 0.5–1.4)
GLUCOSE BLD-MCNC: 163 MG/DL (ref 65–99)
GLUCOSE BLD-MCNC: 175 MG/DL (ref 65–99)
GLUCOSE BLD-MCNC: 196 MG/DL (ref 65–99)
GLUCOSE BLD-MCNC: 200 MG/DL (ref 65–99)
GLUCOSE SERPL-MCNC: 165 MG/DL (ref 65–99)
MAGNESIUM SERPL-MCNC: 2.1 MG/DL (ref 1.5–2.5)
PHOSPHATE SERPL-MCNC: 3.9 MG/DL (ref 2.5–4.5)
POTASSIUM SERPL-SCNC: 4.5 MMOL/L (ref 3.6–5.5)
SODIUM SERPL-SCNC: 138 MMOL/L (ref 135–145)

## 2019-03-03 PROCEDURE — 80069 RENAL FUNCTION PANEL: CPT

## 2019-03-03 PROCEDURE — 83735 ASSAY OF MAGNESIUM: CPT

## 2019-03-03 PROCEDURE — A9270 NON-COVERED ITEM OR SERVICE: HCPCS | Performed by: INTERNAL MEDICINE

## 2019-03-03 PROCEDURE — 700102 HCHG RX REV CODE 250 W/ 637 OVERRIDE(OP): Performed by: INTERNAL MEDICINE

## 2019-03-03 PROCEDURE — 770020 HCHG ROOM/CARE - TELE (206)

## 2019-03-03 PROCEDURE — 82962 GLUCOSE BLOOD TEST: CPT | Mod: 91

## 2019-03-03 PROCEDURE — 700111 HCHG RX REV CODE 636 W/ 250 OVERRIDE (IP): Performed by: HOSPITALIST

## 2019-03-03 PROCEDURE — 99232 SBSQ HOSP IP/OBS MODERATE 35: CPT | Performed by: INTERNAL MEDICINE

## 2019-03-03 PROCEDURE — A9270 NON-COVERED ITEM OR SERVICE: HCPCS | Performed by: HOSPITALIST

## 2019-03-03 PROCEDURE — 700102 HCHG RX REV CODE 250 W/ 637 OVERRIDE(OP): Performed by: HOSPITALIST

## 2019-03-03 RX ORDER — NYSTATIN 100000 [USP'U]/G
POWDER TOPICAL 2 TIMES DAILY
Status: DISCONTINUED | OUTPATIENT
Start: 2019-03-03 | End: 2019-03-05 | Stop reason: HOSPADM

## 2019-03-03 RX ORDER — POTASSIUM CHLORIDE 20 MEQ/1
40 TABLET, EXTENDED RELEASE ORAL DAILY
Status: DISCONTINUED | OUTPATIENT
Start: 2019-03-04 | End: 2019-03-05 | Stop reason: HOSPADM

## 2019-03-03 RX ADMIN — INSULIN HUMAN 1 UNITS: 100 INJECTION, SOLUTION PARENTERAL at 12:11

## 2019-03-03 RX ADMIN — INSULIN HUMAN 1 UNITS: 100 INJECTION, SOLUTION PARENTERAL at 18:12

## 2019-03-03 RX ADMIN — HEPARIN SODIUM 5000 UNITS: 5000 INJECTION, SOLUTION INTRAVENOUS; SUBCUTANEOUS at 21:33

## 2019-03-03 RX ADMIN — INSULIN HUMAN 1 UNITS: 100 INJECTION, SOLUTION PARENTERAL at 06:34

## 2019-03-03 RX ADMIN — INSULIN HUMAN 1 UNITS: 100 INJECTION, SOLUTION PARENTERAL at 21:38

## 2019-03-03 RX ADMIN — FUROSEMIDE 40 MG: 40 TABLET ORAL at 15:11

## 2019-03-03 RX ADMIN — ASPIRIN 81 MG: 81 TABLET, COATED ORAL at 06:12

## 2019-03-03 RX ADMIN — ACETAMINOPHEN 650 MG: 325 TABLET, FILM COATED ORAL at 00:11

## 2019-03-03 RX ADMIN — FUROSEMIDE 40 MG: 40 TABLET ORAL at 06:12

## 2019-03-03 RX ADMIN — POTASSIUM CHLORIDE 40 MEQ: 1500 TABLET, EXTENDED RELEASE ORAL at 06:12

## 2019-03-03 RX ADMIN — NYSTATIN: 100000 POWDER TOPICAL at 18:03

## 2019-03-03 RX ADMIN — HEPARIN SODIUM 5000 UNITS: 5000 INJECTION, SOLUTION INTRAVENOUS; SUBCUTANEOUS at 06:12

## 2019-03-03 RX ADMIN — ZOLPIDEM TARTRATE 5 MG: 5 TABLET ORAL at 23:45

## 2019-03-03 RX ADMIN — STANDARDIZED SENNA CONCENTRATE AND DOCUSATE SODIUM 2 TABLET: 8.6; 5 TABLET, FILM COATED ORAL at 17:58

## 2019-03-03 RX ADMIN — HEPARIN SODIUM 5000 UNITS: 5000 INJECTION, SOLUTION INTRAVENOUS; SUBCUTANEOUS at 15:13

## 2019-03-03 RX ADMIN — SPIRONOLACTONE 25 MG: 25 TABLET, FILM COATED ORAL at 06:12

## 2019-03-03 RX ADMIN — BUDESONIDE AND FORMOTEROL FUMARATE DIHYDRATE 2 PUFF: 160; 4.5 AEROSOL RESPIRATORY (INHALATION) at 18:05

## 2019-03-03 RX ADMIN — BUDESONIDE AND FORMOTEROL FUMARATE DIHYDRATE 2 PUFF: 160; 4.5 AEROSOL RESPIRATORY (INHALATION) at 06:12

## 2019-03-03 RX ADMIN — CARVEDILOL 12.5 MG: 6.25 TABLET, FILM COATED ORAL at 18:03

## 2019-03-03 RX ADMIN — ATORVASTATIN CALCIUM 20 MG: 20 TABLET, FILM COATED ORAL at 06:12

## 2019-03-03 RX ADMIN — LISINOPRIL 5 MG: 5 TABLET ORAL at 06:12

## 2019-03-03 RX ADMIN — TAMSULOSIN HYDROCHLORIDE 0.4 MG: 0.4 CAPSULE ORAL at 08:25

## 2019-03-03 ASSESSMENT — PATIENT HEALTH QUESTIONNAIRE - PHQ9
SUM OF ALL RESPONSES TO PHQ9 QUESTIONS 1 AND 2: 0
2. FEELING DOWN, DEPRESSED, IRRITABLE, OR HOPELESS: NOT AT ALL
1. LITTLE INTEREST OR PLEASURE IN DOING THINGS: NOT AT ALL

## 2019-03-03 ASSESSMENT — ENCOUNTER SYMPTOMS
BLOOD IN STOOL: 0
WEAKNESS: 1
SENSORY CHANGE: 0
ABDOMINAL PAIN: 0
COUGH: 0
MYALGIAS: 0
MEMORY LOSS: 1
BACK PAIN: 0
SPEECH CHANGE: 0
FEVER: 0
DEPRESSION: 0
HALLUCINATIONS: 0
PALPITATIONS: 0
SHORTNESS OF BREATH: 0
DIZZINESS: 0
CONSTIPATION: 0
FOCAL WEAKNESS: 0
CHILLS: 0
DIARRHEA: 0

## 2019-03-03 NOTE — PROGRESS NOTES
[facility] Medicine Daily Progress Note    Date of Service  3/2/2019    Chief Complaint  84 y.o. male admitted 2/27/2019 with SOB, leg swelling    Hospital Course    hx of CAD s/p stent, residual disease, not a candidate for CABG, systolic HF due to ischemic CM w EF of ~30%, HLD, COPD w remote tobacco admitted for SOB and LE swelling      Interval Problem Update  2/28: Tolerating lasix, discussed w cardiology, med management only.  Discussed with wife trudi and palliative care re: goals of care.  Not ready for hospice, they confirm DNR, want home health.  Polst completed  3/1: Edema improved, Discussed w cardiology, change to PO lasix, increase lisin, poss home in AM  3/2: Edema worse than yesterday on lower lasix, no HH available.  Possible SNF, referrals pending.  Increased lasix    Consultants/Specialty  Palliative Care  Dr. Bolton- Cardiology    Code Status  DNR/DNI    Disposition  Home health (ordered)    Review of Systems  Review of Systems   Constitutional: Positive for malaise/fatigue. Negative for chills and fever.   HENT: Positive for hearing loss. Negative for congestion and nosebleeds.    Respiratory: Negative for cough and shortness of breath.    Cardiovascular: Positive for leg swelling (Improved). Negative for chest pain and palpitations.   Gastrointestinal: Negative for abdominal pain, blood in stool, constipation and diarrhea.   Genitourinary: Negative for dysuria and frequency.   Musculoskeletal: Negative for back pain and myalgias.   Neurological: Positive for weakness. Negative for dizziness, sensory change, speech change and focal weakness.   Psychiatric/Behavioral: Positive for memory loss. Negative for depression and hallucinations.   All other systems reviewed and are negative.       Physical Exam  Temp:  [36.3 °C (97.3 °F)-36.5 °C (97.7 °F)] 36.5 °C (97.7 °F)  Pulse:  [73-87] 79  Resp:  [18] 18  BP: ()/(55-97) 102/72  SpO2:  [92 %-94 %] 93 %    Physical Exam   Constitutional: He is  oriented to person, place, and time. He appears well-developed and well-nourished. No distress.   Drowsy, nodding off   HENT:   Head: Normocephalic.   Mouth/Throat: No oropharyngeal exudate.   Hearing loss   Eyes: Pupils are equal, round, and reactive to light. Conjunctivae are normal.   Neck: Normal range of motion. No tracheal deviation present.   Cardiovascular: Normal rate and regular rhythm.    No murmur heard.  Pulmonary/Chest: Effort normal. He has no wheezes. He has no rales. He exhibits no tenderness.   Abdominal: Soft. He exhibits no distension. There is no tenderness. There is no guarding.   Musculoskeletal: Normal range of motion. He exhibits edema (1+). He exhibits no tenderness.   Lymphadenopathy:     He has no cervical adenopathy.   Neurological: He is alert and oriented to person, place, and time. No cranial nerve deficit.   Skin: Skin is warm and dry. No rash noted. There is pallor.   Psychiatric: He is slowed. Cognition and memory are impaired. He expresses impulsivity and inappropriate judgment.   Nursing note and vitals reviewed.      Fluids    Intake/Output Summary (Last 24 hours) at 03/02/19 1629  Last data filed at 03/02/19 1000   Gross per 24 hour   Intake              740 ml   Output              450 ml   Net              290 ml       Laboratory  Recent Labs      02/27/19   1843  02/28/19   0421  03/01/19   0430   WBC  7.2  6.8  6.4   RBC  4.17*  4.20*  4.12*   HEMOGLOBIN  13.6*  13.5*  13.4*   HEMATOCRIT  43.3  43.2  42.1   MCV  103.8*  102.9*  102.2*   MCH  32.6  32.1  32.5   MCHC  31.4*  31.3*  31.8*   RDW  50.9*  50.8*  49.8   PLATELETCT  186  187  195   MPV  10.5  10.6  10.5     Recent Labs      02/28/19   0421  03/01/19   0430  03/02/19   0912   SODIUM  138  137  136   POTASSIUM  3.4*  3.4*  4.1   CHLORIDE  99  98  100   CO2  26  26  27   GLUCOSE  160*  152*  251*   BUN  22  25*  31*   CREATININE  1.52*  1.59*  1.46*   CALCIUM  9.1  8.9  9.2         Recent Labs      02/27/19   1843    BNPBTYPENAT  1499*           Imaging  EC-ECHOCARDIOGRAM COMPLETE W/O CONT   Final Result      OUTSIDE IMAGES-DX CHEST   Final Result           Assessment/Plan  * Acute on chronic combined systolic (congestive) and diastolic (congestive) heart failure (HCC)- (present on admission)   Assessment & Plan    Failed outpatient therapy, despite doubling on his PO lasix at home.   Lasix 40 IV given this morning, change to p.o. twice daily tomorrow  UOP, Daily weights, Low sodium diet, fluid restriction to 1500 cc daily  Echocardiogram w no change: EF 25-%, PAH 50  He is on room air  Spironolactone  Lisinopril 5  Coreg 12.5  Lasix 40 PO daily, edema worse again, increase to BID  Atorva 20  Nitro PRN     Cardiac pacemaker in situ- (present on admission)   Assessment & Plan    Dr. Jarrell     Essential hypertension, benign- (present on admission)   Assessment & Plan    Well-controlled continue  -lisinopril and carvedilol  -Spironolactone 25 daily     Coronary artery disease involving native coronary artery without angina pectoris- (present on admission)   Assessment & Plan    Background history, history of severe stenosis including multiple vessels, has had a history of unsuccessful PCI in the past, and declined coronary bypass in the past as well.  Continue beta-blocker, statin and aspirin  Added spironolactone and ACE this admission  I discussed the case with cardiology, they agree that no intervention is recommended  Discussion with the wife it bedside, she is not ready for hospice but would like home health  POLST completed     Mixed hyperlipidemia- (present on admission)   Assessment & Plan    Atorvastatin     CKD (chronic kidney disease) stage 3, GFR 30-59 ml/min ([facility])- (present on admission)   Assessment & Plan    Renal functions within baseline, his current creatinine 1.54  We will continue to monitor daily BMP for any acute changes,  We will avoid NSAIDs and nephrotoxins     Elevated troponin- (present on admission)    Assessment & Plan    Elevated troponins at outlying facility 0.10  Most likely due to demand ischemia, based on background of significant coronary artery disease that was previously not amendable to PCI, patient family have declined in the past for coronary arterial bypass.  We will maintain aspirin statin and beta-blocker for cardiac protective measures at this time.  Continue to trend serial troponins.  Discussed with cardiology, med management only     COPD (chronic obstructive pulmonary disease) (CMS-HCC)- (present on admission)   Assessment & Plan    Not in acute exacerbation  Resume home inhalers including Symbicort, as needed albuterol  Continue RT protocol, duo nebs, Pep therapy if warranted, and incentive spirometry.        Diabetes type 2, controlled (CMS-HCC)- (present on admission)   Assessment & Plan    Hold home dose of glipizide, last A1c was 7.3  Continue Insulin-sliding scale, accu-checks and hypoglycemia protocol.     Cognitive impairment   Assessment & Plan    Hard of hearing which complicates things but he is tangential and poor insight overall  Wife is making decisions  SNF vs home (no home health available)     DNR (do not resuscitate)   Assessment & Plan    Confirmed with wife Karma at bedside     BPH (benign prostatic hyperplasia)- (present on admission)   Assessment & Plan    Continue with home dose of Flomax          VTE prophylaxis: Heparin

## 2019-03-03 NOTE — PROGRESS NOTES
Tele strip at 0738 shows v-paced in 80s.        Tele Shift Summary:    Rhythm : SR, in and out of BBB  Rate : 70-80s  Ectopy : Per CCT Emily, pt had frequent PVCs, rare to occasional couplets, and rare bigimeny and trigeminy.     Telemetry monitoring strips placed in pt's chart.

## 2019-03-03 NOTE — CARE PLAN
Problem: Discharge Barriers/Planning  Goal: Patient's continuum of care needs will be met  Outcome: PROGRESSING AS EXPECTED  SNF referral sent; social work/case management on board to assist.

## 2019-03-03 NOTE — ASSESSMENT & PLAN NOTE
Hard of hearing which complicates things but he is tangential and poor insight overall  Wife is making decisions  SNF in AM if accepted

## 2019-03-03 NOTE — PROGRESS NOTES
Report received from Naty WORTHY. POC discussed. Pt resting comfortably in edge of bed. Safety precaution in place. Bed alarm in place.

## 2019-03-03 NOTE — DISCHARGE PLANNING
Anticipated Discharge Disposition: SNF    Action: Notified by BSN pt's spouse has questions in regards to d/c.   Met with spouse at bedside who is asking when pt will transfer to SNF. Notified her pt is pending acceptance at this time and we won't hear back from the facility until tomorrow. Discussed that once acceptance and bed is confirmed we can arrange transfer. She voiced understanding and states that if pt is declined she will take pt home, as she doesn't want him placed in a local SNF.     Barriers to Discharge: SNF acceptance    Plan: F/U with [facility] Transition Team Assessment    Information Source  Orientation : Disoriented to Event, Disoriented to Time  Information Given By: Spouse  Informant's Name: Karma Salas         Elopement Risk  Legal Hold: No  Ambulatory or Self Mobile in Wheelchair: No-Not an Elopement Risk  Elopement Risk: Not at Risk for Elopement    Interdisciplinary Discharge Planning  Does Admitting Nurse Feel This Could be a Complex Discharge?: No  Primary Care Physician: Dagoberto Quinones  Lives with - Patient's Self Care Capacity: Spouse  Patient or legal guardian wants to designate a caregiver (see row info): No  Support Systems: Friends / Neighbors, Family Member(s), Spouse / Significant Other  Housing / Facility: 1 Story House  Do You Take your Prescribed Medications Regularly: Yes  Able to Return to Previous ADL's: Yes  Mobility Issues: Yes  Prior Services: None  Patient Expects to be Discharged to:: Home  Assistance Needed: Unknown at this Time  Durable Medical Equipment: Walker    Discharge Preparedness  What is your plan after discharge?: Skilled nursing facility  What are your discharge supports?: Spouse  Prior Functional Level: Needs Assist with Activities of Daily Living, Needs Assist with Medication Management, Uses Walker    Functional Assesment  Prior Functional Level: Needs Assist with Activities of Daily Living, Needs Assist with Medication  Management, Uses Walker    Finances  Financial Barriers to Discharge: No  Prescription Coverage: Yes    Vision / Hearing Impairment  Vision Impairment : Yes  Right Eye Vision: Impaired, Wears Glasses (readers)  Left Eye Vision: Impaired, Wears Glasses (readers)  Hearing Impairment : Yes  Hearing Impairment: Both Ears, Hearing Device Not Available  Does Pt Need Special Equipment for the Hearing Impaired?: No              Domestic Abuse  Have you ever been the victim of abuse or violence?: No  Possible Abuse Reported to:: Not Applicable    Psychological Assessment  History of Substance Abuse: None  History of Psychiatric Problems: No  Non-compliant with Treatment: No  Newly Diagnosed Illness: Yes    Discharge Risks or Barriers  Discharge risks or barriers?: No    Anticipated Discharge Information  Anticipated discharge disposition: SNF

## 2019-03-03 NOTE — CARE PLAN
Problem: Venous Thromboembolism (VTW)/Deep Vein Thrombosis (DVT) Prevention:  Goal: Patient will participate in Venous Thrombosis (VTE)/Deep Vein Thrombosis (DVT)Prevention Measures  Outcome: PROGRESSING AS EXPECTED      Problem: Urinary Elimination:  Goal: Ability to reestablish a normal urinary elimination pattern will improve  Outcome: PROGRESSING AS EXPECTED  Frequent assessment and rounding; patient receiving Lasix and on 1500 fluid restriction; frequent mobilization and patient receiving subQ heparin.

## 2019-03-03 NOTE — PROGRESS NOTES
Assessed pt, AAOx2-3, overall confused. Disoriented to time and events at times. Pt sitting on the EOB. Pt [place]. VS taken. Due med given. Bed alarm in place and remote within reach. pt's complaining of pain on his lower back. Pt unable to quantify. Pt offered repositioning, cold/heat pack. No additional needs at this time.

## 2019-03-03 NOTE — PROGRESS NOTES
As the day progressed, pt more disoriented to the events and where he was; needing frequent orientating.   Pt has been going from the chair to the bed as sba.  Pt requesting to call his wife and made this phone call and he spoke to her; plan is for him to go to the snf in [place].  VSS.  Pt has not voiding in a urinal even after explaining why.      Report given to Naty at bedside as pt up to the bathroom.

## 2019-03-03 NOTE — PROGRESS NOTES
Telemetry Shift Summary    Rhythm SR/V Paced On Demand; Right BBB  HR Range 70s-90s  Ectopy occasional to frequent PVC; rare trigeminy  Measurements 0.14/0.14/0.38        Normal Values  Rhythm SR  HR Range    Measurements 0.12-0.20 / 0.06-0.10  / 0.30-0.52

## 2019-03-03 NOTE — CARE PLAN
Problem: Discharge Barriers/Planning  Goal: Patient's continuum of care needs will be met  Outcome: PROGRESSING SLOWER THAN EXPECTED  Pt to d/c to another facility, pt's spouse only wanting to go to [facility]. SW involved. Pt otherwise medically stable. Functioning at baseline.    Problem: Fluid Volume:  Goal: Will maintain balanced intake and output  Outcome: PROGRESSING AS EXPECTED  Pt on diuretics and potassium. Fluid restriction in place. Pt frequently reminded of dietary constrictions such as low salt and water intake. I/O measured as strictly as possible, pt is incontinent at times. Pt confused, will continue to reinforce.

## 2019-03-03 NOTE — PROGRESS NOTES
Telemetry Shift Summary    Rhythm paced  HR Range 70's-90's  Ectopy rare PVC/bigeminy  Measurements paced/.14/.38        Normal Values  Rhythm SR  HR Range    Measurements 0.12-0.20 / 0.06-0.10  / 0.30-0.52

## 2019-03-03 NOTE — DISCHARGE PLANNING
SW spoke to pt's spouse Karma.  She now only wants pt to transfer to [facility] in order for her to see pt several times per day.  Admissions is not open during the weekends.  VANESSA updated ZAYNAB Mckinley.

## 2019-03-03 NOTE — PROGRESS NOTES
Pt up in chair, alarm on. Pt family at bedside. Checked  mg/dl, gave due med. No additonal needs at this time. SW at bedside to discuss d/c.

## 2019-03-04 LAB
ALBUMIN SERPL BCP-MCNC: 3.8 G/DL (ref 3.2–4.9)
BASOPHILS # BLD AUTO: 0.3 % (ref 0–1.8)
BASOPHILS # BLD: 0.02 K/UL (ref 0–0.12)
BUN SERPL-MCNC: 24 MG/DL (ref 8–22)
CALCIUM SERPL-MCNC: 9.4 MG/DL (ref 8.4–10.2)
CHLORIDE SERPL-SCNC: 102 MMOL/L (ref 96–112)
CO2 SERPL-SCNC: 23 MMOL/L (ref 20–33)
CREAT SERPL-MCNC: 1.4 MG/DL (ref 0.5–1.4)
EOSINOPHIL # BLD AUTO: 0.12 K/UL (ref 0–0.51)
EOSINOPHIL NFR BLD: 1.6 % (ref 0–6.9)
ERYTHROCYTE [DISTWIDTH] IN BLOOD BY AUTOMATED COUNT: 48.1 FL (ref 35.9–50)
GLUCOSE BLD-MCNC: 148 MG/DL (ref 65–99)
GLUCOSE SERPL-MCNC: 149 MG/DL (ref 65–99)
HCT VFR BLD AUTO: 45.1 % (ref 42–52)
HGB BLD-MCNC: 14.4 G/DL (ref 14–18)
IMM GRANULOCYTES # BLD AUTO: 0.05 K/UL (ref 0–0.11)
IMM GRANULOCYTES NFR BLD AUTO: 0.7 % (ref 0–0.9)
LYMPHOCYTES # BLD AUTO: 0.96 K/UL (ref 1–4.8)
LYMPHOCYTES NFR BLD: 13 % (ref 22–41)
MAGNESIUM SERPL-MCNC: 2.1 MG/DL (ref 1.5–2.5)
MCH RBC QN AUTO: 32.3 PG (ref 27–33)
MCHC RBC AUTO-ENTMCNC: 31.9 G/DL (ref 33.7–35.3)
MCV RBC AUTO: 101.1 FL (ref 81.4–97.8)
MONOCYTES # BLD AUTO: 0.82 K/UL (ref 0–0.85)
MONOCYTES NFR BLD AUTO: 11.1 % (ref 0–13.4)
NEUTROPHILS # BLD AUTO: 5.41 K/UL (ref 1.82–7.42)
NEUTROPHILS NFR BLD: 73.3 % (ref 44–72)
NRBC # BLD AUTO: 0 K/UL
NRBC BLD-RTO: 0 /100 WBC
PHOSPHATE SERPL-MCNC: 3.9 MG/DL (ref 2.5–4.5)
PLATELET # BLD AUTO: 193 K/UL (ref 164–446)
PMV BLD AUTO: 11.2 FL (ref 9–12.9)
POTASSIUM SERPL-SCNC: 3.9 MMOL/L (ref 3.6–5.5)
RBC # BLD AUTO: 4.46 M/UL (ref 4.7–6.1)
SODIUM SERPL-SCNC: 136 MMOL/L (ref 135–145)
WBC # BLD AUTO: 7.4 K/UL (ref 4.8–10.8)

## 2019-03-04 PROCEDURE — 97112 NEUROMUSCULAR REEDUCATION: CPT

## 2019-03-04 PROCEDURE — 80069 RENAL FUNCTION PANEL: CPT

## 2019-03-04 PROCEDURE — 97535 SELF CARE MNGMENT TRAINING: CPT

## 2019-03-04 PROCEDURE — 700102 HCHG RX REV CODE 250 W/ 637 OVERRIDE(OP): Performed by: HOSPITALIST

## 2019-03-04 PROCEDURE — 83735 ASSAY OF MAGNESIUM: CPT

## 2019-03-04 PROCEDURE — 770020 HCHG ROOM/CARE - TELE (206)

## 2019-03-04 PROCEDURE — A9270 NON-COVERED ITEM OR SERVICE: HCPCS | Performed by: INTERNAL MEDICINE

## 2019-03-04 PROCEDURE — 82962 GLUCOSE BLOOD TEST: CPT | Mod: 91

## 2019-03-04 PROCEDURE — 700102 HCHG RX REV CODE 250 W/ 637 OVERRIDE(OP): Performed by: INTERNAL MEDICINE

## 2019-03-04 PROCEDURE — 85025 COMPLETE CBC W/AUTO DIFF WBC: CPT

## 2019-03-04 PROCEDURE — 97116 GAIT TRAINING THERAPY: CPT

## 2019-03-04 PROCEDURE — 700111 HCHG RX REV CODE 636 W/ 250 OVERRIDE (IP): Performed by: HOSPITALIST

## 2019-03-04 PROCEDURE — 99232 SBSQ HOSP IP/OBS MODERATE 35: CPT | Performed by: INTERNAL MEDICINE

## 2019-03-04 PROCEDURE — A9270 NON-COVERED ITEM OR SERVICE: HCPCS | Performed by: HOSPITALIST

## 2019-03-04 RX ORDER — FUROSEMIDE 40 MG/1
60 TABLET ORAL 2 TIMES DAILY
Qty: 90 TAB | Refills: 1 | Status: SHIPPED | OUTPATIENT
Start: 2019-03-04 | End: 2019-03-04

## 2019-03-04 RX ORDER — POTASSIUM CHLORIDE 20 MEQ/1
20 TABLET, EXTENDED RELEASE ORAL DAILY
Qty: 60 TAB | Refills: 11 | Status: SHIPPED | OUTPATIENT
Start: 2019-03-04 | End: 2019-03-04

## 2019-03-04 RX ORDER — SPIRONOLACTONE 25 MG/1
25 TABLET ORAL DAILY
Qty: 30 TAB | Refills: 3 | Status: SHIPPED | OUTPATIENT
Start: 2019-03-04 | End: 2019-03-04

## 2019-03-04 RX ORDER — ALBUTEROL SULFATE 90 UG/1
2 AEROSOL, METERED RESPIRATORY (INHALATION) EVERY 4 HOURS PRN
Qty: 8.5 G | Refills: 0 | Status: SHIPPED | OUTPATIENT
Start: 2019-03-04

## 2019-03-04 RX ORDER — NITROGLYCERIN 0.4 MG/1
0.4 TABLET SUBLINGUAL PRN
Qty: 25 TAB | Refills: 3 | Status: SHIPPED | OUTPATIENT
Start: 2019-03-04

## 2019-03-04 RX ORDER — LISINOPRIL 5 MG/1
5 TABLET ORAL DAILY
Qty: 30 TAB | Refills: 2 | Status: SHIPPED | OUTPATIENT
Start: 2019-03-04 | End: 2019-03-04

## 2019-03-04 RX ORDER — LISINOPRIL 5 MG/1
5 TABLET ORAL DAILY
Qty: 30 TAB | Refills: 2 | Status: SHIPPED | OUTPATIENT
Start: 2019-03-04

## 2019-03-04 RX ORDER — GLIPIZIDE 2.5 MG/1
5 TABLET, EXTENDED RELEASE ORAL DAILY
Qty: 30 TAB | Refills: 2 | Status: SHIPPED | OUTPATIENT
Start: 2019-03-04

## 2019-03-04 RX ORDER — NYSTATIN 100000 [USP'U]/G
1 POWDER TOPICAL 4 TIMES DAILY
Qty: 15 G | Refills: 2 | Status: SHIPPED | OUTPATIENT
Start: 2019-03-04 | End: 2019-03-04

## 2019-03-04 RX ORDER — SPIRONOLACTONE 25 MG/1
25 TABLET ORAL DAILY
Qty: 30 TAB | Refills: 3 | Status: SHIPPED | OUTPATIENT
Start: 2019-03-04

## 2019-03-04 RX ORDER — TAMSULOSIN HYDROCHLORIDE 0.4 MG/1
0.4 CAPSULE ORAL
Qty: 30 CAP | Refills: 1 | Status: SHIPPED | OUTPATIENT
Start: 2019-03-04

## 2019-03-04 RX ORDER — CARVEDILOL 12.5 MG/1
12.5 TABLET ORAL 2 TIMES DAILY WITH MEALS
Qty: 60 TAB | Refills: 3 | Status: SHIPPED | OUTPATIENT
Start: 2019-03-04

## 2019-03-04 RX ORDER — NYSTATIN 100000 [USP'U]/G
1 POWDER TOPICAL 4 TIMES DAILY
Qty: 15 G | Refills: 2 | Status: SHIPPED | OUTPATIENT
Start: 2019-03-04

## 2019-03-04 RX ORDER — FUROSEMIDE 40 MG/1
60 TABLET ORAL 2 TIMES DAILY
Qty: 90 TAB | Refills: 1 | Status: SHIPPED | OUTPATIENT
Start: 2019-03-04

## 2019-03-04 RX ORDER — BUDESONIDE AND FORMOTEROL FUMARATE DIHYDRATE 160; 4.5 UG/1; UG/1
2 AEROSOL RESPIRATORY (INHALATION) 2 TIMES DAILY
Qty: 1 INHALER | Refills: 0 | Status: SHIPPED | OUTPATIENT
Start: 2019-03-04

## 2019-03-04 RX ORDER — COLCHICINE 0.6 MG/1
TABLET ORAL
Qty: 3 TAB | Refills: 0 | Status: SHIPPED | OUTPATIENT
Start: 2019-03-04

## 2019-03-04 RX ORDER — POTASSIUM CHLORIDE 20 MEQ/1
20 TABLET, EXTENDED RELEASE ORAL DAILY
Qty: 60 TAB | Refills: 11 | Status: SHIPPED | OUTPATIENT
Start: 2019-03-04

## 2019-03-04 RX ORDER — ATORVASTATIN CALCIUM 20 MG/1
20 TABLET, FILM COATED ORAL DAILY
Qty: 30 TAB | Refills: 2 | Status: SHIPPED | OUTPATIENT
Start: 2019-03-04

## 2019-03-04 RX ADMIN — STANDARDIZED SENNA CONCENTRATE AND DOCUSATE SODIUM 2 TABLET: 8.6; 5 TABLET, FILM COATED ORAL at 05:39

## 2019-03-04 RX ADMIN — NYSTATIN: 100000 POWDER TOPICAL at 17:24

## 2019-03-04 RX ADMIN — TAMSULOSIN HYDROCHLORIDE 0.4 MG: 0.4 CAPSULE ORAL at 08:09

## 2019-03-04 RX ADMIN — INSULIN HUMAN 2 UNITS: 100 INJECTION, SOLUTION PARENTERAL at 11:59

## 2019-03-04 RX ADMIN — FUROSEMIDE 40 MG: 40 TABLET ORAL at 15:14

## 2019-03-04 RX ADMIN — LISINOPRIL 5 MG: 5 TABLET ORAL at 05:43

## 2019-03-04 RX ADMIN — BUDESONIDE AND FORMOTEROL FUMARATE DIHYDRATE 2 PUFF: 160; 4.5 AEROSOL RESPIRATORY (INHALATION) at 17:24

## 2019-03-04 RX ADMIN — POTASSIUM CHLORIDE 40 MEQ: 1500 TABLET, EXTENDED RELEASE ORAL at 05:39

## 2019-03-04 RX ADMIN — HEPARIN SODIUM 5000 UNITS: 5000 INJECTION, SOLUTION INTRAVENOUS; SUBCUTANEOUS at 22:04

## 2019-03-04 RX ADMIN — ASPIRIN 81 MG: 81 TABLET, COATED ORAL at 05:39

## 2019-03-04 RX ADMIN — STANDARDIZED SENNA CONCENTRATE AND DOCUSATE SODIUM 2 TABLET: 8.6; 5 TABLET, FILM COATED ORAL at 17:23

## 2019-03-04 RX ADMIN — INSULIN HUMAN 1 UNITS: 100 INJECTION, SOLUTION PARENTERAL at 17:24

## 2019-03-04 RX ADMIN — HEPARIN SODIUM 5000 UNITS: 5000 INJECTION, SOLUTION INTRAVENOUS; SUBCUTANEOUS at 15:14

## 2019-03-04 RX ADMIN — FUROSEMIDE 40 MG: 40 TABLET ORAL at 05:40

## 2019-03-04 RX ADMIN — BUDESONIDE AND FORMOTEROL FUMARATE DIHYDRATE 2 PUFF: 160; 4.5 AEROSOL RESPIRATORY (INHALATION) at 05:43

## 2019-03-04 RX ADMIN — NYSTATIN: 100000 POWDER TOPICAL at 05:42

## 2019-03-04 RX ADMIN — ZOLPIDEM TARTRATE 5 MG: 5 TABLET ORAL at 22:03

## 2019-03-04 RX ADMIN — SPIRONOLACTONE 25 MG: 25 TABLET, FILM COATED ORAL at 05:39

## 2019-03-04 RX ADMIN — ATORVASTATIN CALCIUM 20 MG: 20 TABLET, FILM COATED ORAL at 05:43

## 2019-03-04 RX ADMIN — HEPARIN SODIUM 5000 UNITS: 5000 INJECTION, SOLUTION INTRAVENOUS; SUBCUTANEOUS at 05:38

## 2019-03-04 ASSESSMENT — ENCOUNTER SYMPTOMS
COUGH: 0
MEMORY LOSS: 1
PALPITATIONS: 0
CHILLS: 0
FEVER: 0
BLURRED VISION: 0
SPEECH CHANGE: 0
WEAKNESS: 1
ABDOMINAL PAIN: 0
CONSTIPATION: 0
PHOTOPHOBIA: 0
SENSORY CHANGE: 0
DIZZINESS: 0
BACK PAIN: 0
SHORTNESS OF BREATH: 0
DOUBLE VISION: 0
FOCAL WEAKNESS: 0
MYALGIAS: 0
HEARTBURN: 0
DEPRESSION: 0
DIARRHEA: 0
HALLUCINATIONS: 0

## 2019-03-04 ASSESSMENT — GAIT ASSESSMENTS
ASSISTIVE DEVICE: FRONT WHEEL WALKER
DEVIATION: SHUFFLED GAIT;DECREASED HEEL STRIKE;DECREASED TOE OFF
DISTANCE (FEET): 200
GAIT LEVEL OF ASSIST: STAND BY ASSIST

## 2019-03-04 NOTE — DISCHARGE PLANNING
Anticipated Discharge Disposition: SNF    Action: Paula Zamora notified of pt departure to [facility] tomorrow at 1030.  Out of pocket cost for pt is $200 which wife has agreed to pay.  [facility] to call wife for payment.  RN NIKOLAS LVM for wife to notify her of time of departure and contact info for [facility].     Barriers to Discharge: transport    Plan: Follow up with wife.

## 2019-03-04 NOTE — THERAPY
"Physical Therapy Treatment completed.   Bed Mobility:     Transfers: Sit to Stand: Stand by Assist  Gait: Level Of Assist: Stand by Assist with Front-Wheel Walker       Plan of Care: Will benefit from Physical Therapy 3 times per week  Discharge Recommendations: Equipment: Front-Wheel Walker. Post-acute therapy Discharge to a transitional care facility for continued skilled therapy services.    Pt continues to demonstrate gait deficits which contribute to high fall risk. Pt is able make correcitons but is unable to sustain without continued cues. Pt will benefit from further therapy.  Pt is ambulating with FWW level surface 200ft SBA. Tolerated gait training CGA no device 10 ft x 2. Limited activity tolerance.     See \"Rehab Therapy-Acute\" Patient Summary Report for complete documentation.       "

## 2019-03-04 NOTE — PROGRESS NOTES
At 05:35 the Pt has a 21 beats of V-Tach. Pt was asymptomatic, report passed on to day shift ZAYNAB Lizarraga.

## 2019-03-04 NOTE — PROGRESS NOTES
Bedside report given to Maria Ines WORTHY. POC discussed. Pt resting comfortably in bed. Safety precautions in place.

## 2019-03-04 NOTE — PROGRESS NOTES
Telemetry summary:  Rhythm: SR partially paced, borderline AV block     HR: 70s to 80s    VA: 0.20   QRS 0.14   QT 0.46   Ectopies: occasional PVC and PAC.

## 2019-03-04 NOTE — CARE PLAN
Problem: Safety  Goal: Will remain free from injury  Provide assistance with mobility, encouraged pt call for assistance when ambulating, reorient pt about fall precaution.

## 2019-03-04 NOTE — PROGRESS NOTES
[facility] Medicine Daily Progress Note    Date of Service  3/3/2019    Chief Complaint  84 y.o. male admitted 2/27/2019 with SOB, leg swelling    Hospital Course    hx of CAD s/p stent, residual disease, not a candidate for CABG, systolic HF due to ischemic CM w EF of ~30%, HLD, COPD w remote tobacco admitted for SOB and LE swelling      Interval Problem Update  2/28: Tolerating lasix, discussed w cardiology, med management only.  Discussed with wife trudi and palliative care re: goals of care.  Not ready for hospice, they confirm DNR, want home health.  Polst completed  3/1: Edema improved, Discussed w cardiology, change to PO lasix, increase lisin, poss home in AM  3/2: Edema worse than yesterday on lower lasix, no HH available.  Possible SNF, referrals pending.  Increased lasix    Consultants/Specialty  Palliative Care  Dr. Bolton- Cardiology    Code Status  DNR/DNI    Disposition  Home health (ordered)    Review of Systems  Review of Systems   Constitutional: Negative for chills, fever and malaise/fatigue.   HENT: Positive for hearing loss. Negative for congestion and nosebleeds.    Respiratory: Negative for cough and shortness of breath.    Cardiovascular: Positive for leg swelling (Improved). Negative for chest pain and palpitations.   Gastrointestinal: Negative for abdominal pain, blood in stool, constipation and diarrhea.   Genitourinary: Negative for dysuria and frequency.   Musculoskeletal: Negative for back pain and myalgias.   Skin: Positive for rash (Groin).   Neurological: Positive for weakness. Negative for dizziness, sensory change, speech change and focal weakness.   Psychiatric/Behavioral: Positive for memory loss. Negative for depression and hallucinations.   All other systems reviewed and are negative.       Physical Exam  Temp:  [36.2 °C (97.1 °F)-36.8 °C (98.2 °F)] 36.2 °C (97.1 °F)  Pulse:  [] 74  Resp:  [18-22] 18  BP: (101-123)/(65-80) 101/66  SpO2:  [91 %-99 %] 97 %    Physical Exam    Constitutional: He is oriented to person, place, and time. He appears well-developed and well-nourished. No distress.   Sitting in chair   HENT:   Head: Normocephalic.   Mouth/Throat: No oropharyngeal exudate.   Hearing loss   Eyes: Pupils are equal, round, and reactive to light. Conjunctivae are normal.   Neck: Normal range of motion. No tracheal deviation present.   Cardiovascular: Normal rate and regular rhythm.    No murmur heard.  Pulmonary/Chest: Effort normal. He has no wheezes. He has no rales. He exhibits no tenderness.   Abdominal: Soft. He exhibits no distension. There is no tenderness. There is no guarding.   Musculoskeletal: Normal range of motion. He exhibits edema (1+). He exhibits no tenderness.   Lymphadenopathy:     He has no cervical adenopathy.   Neurological: He is alert and oriented to person, place, and time. No cranial nerve deficit.   Skin: Skin is warm and dry. Rash (Bright red groin rash) noted. There is erythema. There is pallor.   Psychiatric: He is not slowed. Cognition and memory are impaired. He expresses impulsivity and inappropriate judgment.   Nursing note and vitals reviewed.      Fluids    Intake/Output Summary (Last 24 hours) at 03/03/19 1647  Last data filed at 03/03/19 1055   Gross per 24 hour   Intake              700 ml   Output              275 ml   Net              425 ml       Laboratory  Recent Labs      03/01/19   0430   WBC  6.4   RBC  4.12*   HEMOGLOBIN  13.4*   HEMATOCRIT  42.1   MCV  102.2*   MCH  32.5   MCHC  31.8*   RDW  49.8   PLATELETCT  195   MPV  10.5     Recent Labs      03/01/19   0430  03/02/19   0912  03/03/19   0414   SODIUM  137  136  138   POTASSIUM  3.4*  4.1  4.5   CHLORIDE  98  100  105   CO2  26  27  23   GLUCOSE  152*  251*  165*   BUN  25*  31*  26*   CREATININE  1.59*  1.46*  1.49*   CALCIUM  8.9  9.2  9.2                   Imaging  EC-ECHOCARDIOGRAM COMPLETE W/O CONT   Final Result      OUTSIDE IMAGES-DX CHEST   Final Result            Assessment/Plan  * Acute on chronic combined systolic (congestive) and diastolic (congestive) heart failure (HCC)- (present on admission)   Assessment & Plan    Failed outpatient therapy, despite doubling on his PO lasix at home.   Lasix 40 IV given this morning, change to p.o. twice daily tomorrow  UOP, Daily weights, Low sodium diet, fluid restriction to 1500 cc daily  Echocardiogram w no change: EF 25-%, PAH 50  He is on room air  Spironolactone  Lisinopril 5  Coreg 12.5  Lasix 40 PO BID  Atorva 20  Nitro PRN     Cardiac pacemaker in situ- (present on admission)   Assessment & Plan    Dr. Jarrell     Essential hypertension, benign- (present on admission)   Assessment & Plan    Well-controlled continue  -lisinopril and carvedilol  -Spironolactone 25 daily     Coronary artery disease involving native coronary artery without angina pectoris- (present on admission)   Assessment & Plan    Background history, history of severe stenosis including multiple vessels, has had a history of unsuccessful PCI in the past, and declined coronary bypass in the past as well.  Continue beta-blocker, statin and aspirin  Added spironolactone and ACE this admission  I discussed the case with cardiology, they agree that no intervention is recommended  Discussion with the wife it bedside, she is not ready for hospice but would like home health  POLST completed     Mixed hyperlipidemia- (present on admission)   Assessment & Plan    Atorvastatin     CKD (chronic kidney disease) stage 3, GFR 30-59 ml/min ([facility])- (present on admission)   Assessment & Plan    Renal functions within baseline, his current creatinine 1.54  We will continue to monitor daily BMP for any acute changes,  We will avoid NSAIDs and nephrotoxins     Elevated troponin- (present on admission)   Assessment & Plan    Elevated troponins at outlying facility 0.10  Most likely due to demand ischemia, based on background of significant coronary artery disease that was previously  not amendable to PCI, patient family have declined in the past for coronary arterial bypass.  We will maintain aspirin statin and beta-blocker for cardiac protective measures at this time.  Continue to trend serial troponins.  Discussed with cardiology, med management only     COPD (chronic obstructive pulmonary disease) (CMS-HCC)- (present on admission)   Assessment & Plan    Not in acute exacerbation  Resume home inhalers including Symbicort, as needed albuterol  Continue RT protocol, duo nebs, Pep therapy if warranted, and incentive spirometry.        Diabetes type 2, controlled (CMS-HCC)- (present on admission)   Assessment & Plan    Hold home dose of glipizide, last A1c was 7.3  Continue Insulin-sliding scale, accu-checks and hypoglycemia protocol.     Candidal dermatitis   Assessment & Plan    Nystatin powder     Cognitive impairment   Assessment & Plan    Hard of hearing which complicates things but he is tangential and poor insight overall  Wife is making decisions  SNF in AM if accepted     DNR (do not resuscitate)   Assessment & Plan    Confirmed with wife Karma at bedside     BPH (benign prostatic hyperplasia)- (present on admission)   Assessment & Plan    Continue with home dose of Flomax          VTE prophylaxis: Heparin

## 2019-03-04 NOTE — PROGRESS NOTES
Telemetry Shift Summary    Rhythm Paced/SR/BBB  HR Range 70's-90's  Ectopy 7 beats vtach  Measurements paced/.14/.42        Normal Values  Rhythm SR  HR Range    Measurements 0.12-0.20 / 0.06-0.10  / 0.30-0.52

## 2019-03-04 NOTE — PROGRESS NOTES
Pt up to chair for dinner, due meds given, checked BG 195mg/dl, pt complains rashes on groin area, applied powder. MD ordered nystatin powder. No additional needs today.

## 2019-03-04 NOTE — PROGRESS NOTES
Report received from Maria Ines WORTHY. POC discussed. Pt resting comfortably in bed. Safety precaution in place.

## 2019-03-04 NOTE — DISCHARGE PLANNING
Anticipated Discharge Disposition: [facility]    Action: Spoke to wife, agreeable to discharge to SNF.  Per SNF they need dc summary and hard copy of scripts asap for pt to go today.  Their MD is only in for a short time and needs to approve medications.  In addition, there is no snf order.  MD notified of discharge barriers.    Spoke with wife regarding payment for transport.  Insurance co wont pay for transport to [place].  Per [facility] cost is $410.  Wife agreeable to cost.  Once required doc's sent to facility, wife will call [facility] for transport.     Barriers to Discharge: transport, Orders, summary, and RX    Plan: Follow up with barriers.

## 2019-03-04 NOTE — PROGRESS NOTES
Medicated for insomnia. Pt confused but pleasant. Has been getting up from bed several times (bed alarm is on) but easily reoriented.

## 2019-03-04 NOTE — PROGRESS NOTES
Assessment completed. Pt up to chair for breakfast. AAOx2-3. Due med given. Pt denies any pain, n/v. No other complains at this time. Call light and bed alarm in place. Will monitor.

## 2019-03-04 NOTE — PROGRESS NOTES
MD rounded, POC discussed. Due med given. pt sitting up to chair. Helped pt with ambulation. Pt worked with PT, ambulated hallways, back to chair. Denies any pain.

## 2019-03-04 NOTE — CARE PLAN
Problem: Safety  Goal: Will remain free from injury  Outcome: PROGRESSING AS EXPECTED  Check on Pt hourly. Pt encouraged to call for assistance as needed. Bed in low position, upper side rails up, anti slippery socks on, call light within reach, bed alarm on.       Problem: Infection  Goal: Will remain free from infection    Intervention: Assess signs and symptoms of infection  Assess for signs and symptoms of infection. Monitor lab values that indicate possible infection. Implement standard precautions. Perform hand washing.

## 2019-03-04 NOTE — CARE PLAN
Problem: Fluid Volume:  Goal: Will maintain balanced intake and output  Outcome: PROGRESSING AS EXPECTED  Monitor I&Os daily, weight, maintain fluid restrictions, educated pt about fluid intake.

## 2019-03-04 NOTE — PROGRESS NOTES
Pt up to the chair, due meds given, helped ambulated pt and back to chair. Denies any pain, Vital signs taken. BP 87/51, no report of dizziness. No additional needs at this time.

## 2019-03-04 NOTE — PROGRESS NOTES
Received report from day shift nurse. Assumed patient's cares Pt is up in cardiac chair, confused but pleasant. Pt denies any pain, discomfort, or any needs at this time. Encouraged Pt to call for assistance if needed. Chair alarm is on.

## 2019-03-04 NOTE — DISCHARGE PLANNING
Received Transport Form @ 1104  Spoke to Franklin @ [facility]    Transport is scheduled for 3/5 @1030 going to [address] [facility].

## 2019-03-04 NOTE — DISCHARGE SUMMARY
Discharge Summary    CHIEF COMPLAINT ON ADMISSION  Weakness, leg swelling    Reason for Admission  Heart Failure     Admission Date  2/27/2019    CODE STATUS  DNAR/DNI    HPI & HOSPITAL COURSE  This is a 84 y.o. male with a hx of CAD s/p stent, residual disease, not a candidate for CABG, systolic HF due to ischemic CM w EF of ~30%, HLD, COPD w remote tobacco admitted for SOB and LE swelling.  He was found to have acute on chronic systolic heart failure.  Cardiology was consulted and they recommended that no intervention be performed.  They gave recommendations regarding maximizing his medical therapy.  They also recommended a palliative care consultation due to patient's overall poor prognosis.  Patient and his family met with palliative care as he has previously been on hospice in the past.  They did confirm that they wish his CODE STATUS to be DNR however they are not ready for hospice at this time.  The patient was continued on high-dose Lasix therapy and had improvement of his lower extremity edema with this medication.  His cardiac medications were maximized.  He worked with PT and OT recommended skilled nursing facility.    He was weaned to room air.  The patient is very hard of hearing which complicates his baseline cognitive impairment however he had no behavioral issues.    He has a Candida infection of the skin and was given nystatin powder for this.    He will need ongoing follow-up with cardiology as an outpatient.  His outpatient Lasix dose was increased in order to prevent further accumulation of excess fluid.    Therefore, he is discharged in fair and stable condition to skilled nursing facility.    The patient met 2-midnight criteria for an inpatient stay at the time of discharge.    Discharge Date  3/4/2019    FOLLOW UP ITEMS POST DISCHARGE  Follow-up with PCP within 1 week of discharge  Follow-up with cardiology within 1 week of discharge    DISCHARGE DIAGNOSES  Principal Problem:    Acute on  chronic combined systolic (congestive) and diastolic (congestive) heart failure (HCC) POA: Yes  Active Problems:    Mixed hyperlipidemia POA: Yes    Coronary artery disease involving native coronary artery without angina pectoris POA: Yes      Overview: Severe stenosis of LAD and ramus intermedius with moderate proximal RCA       and CFX disease and severe distal disease, PCI not successful and CABG       declined by Dr. Mustafa and family.    Essential hypertension, benign POA: Yes    Cardiac pacemaker in situ POA: Yes      Overview: Pulse generator is a SJM model [id number], serial # [id number], RA lead: SJM       model #2088TC/46 , serial #[id number], RV lead: SJM model #2088TC/52 ,       serial #[id number], Dr. Jarrell, 3/2/2017          Diabetes type 2, controlled (CMS-HCC) POA: Yes    COPD (chronic obstructive pulmonary disease) (CMS-HCC) POA: Yes    Elevated troponin POA: Yes    CKD (chronic kidney disease) stage 3, GFR 30-59 ml/min ([facility]) POA: Yes    BPH (benign prostatic hyperplasia) POA: Yes    DNR (do not resuscitate) POA: Unknown    Cognitive impairment POA: Unknown    Candidal dermatitis POA: Unknown  Resolved Problems:    * No resolved hospital problems. *      FOLLOW UP  Future Appointments  Date Time Provider Department Center   3/7/2019 10:20 AM LORNE [place]orga [facility] None   4/1/2019 10:20 AM Serena Lincoln M.D. [facility] None     [address] [address]  [phone]          MEDICATIONS ON DISCHARGE     Medication List      START taking these medications      Instructions   nystatin powder  Commonly known as:  MYCOSTATIN   Apply 1 g to affected area(s) 4 times a day. Apply to groin  Dose:  1 g     potassium chloride SA 20 MEQ Tbcr  Commonly known as:  Kdur   Take 1 Tab by mouth every day.  Dose:  20 mEq     spironolactone 25 MG Tabs  Commonly known as:  ALDACTONE   Take 1 Tab by mouth every day.  Dose:  25 mg        CHANGE how you take these medications      Instructions    furosemide 40 MG Tabs  What changed:  · when to take this  · additional instructions  Commonly known as:  LASIX   Take 1.5 Tabs by mouth 2 Times a Day. 1.5 tabs twice a day  Dose:  60 mg     lisinopril 5 MG Tabs  What changed:  · medication strength  · how much to take  Commonly known as:  PRINIVIL   Take 1 Tab by mouth every day.  Dose:  5 mg        CONTINUE taking these medications      Instructions   albuterol 108 (90 Base) MCG/ACT Aers inhalation aerosol   Inhale 2 Puffs by mouth every four hours as needed for Shortness of Breath.  Dose:  2 Puff     aspirin EC 81 MG Tbec  Commonly known as:  ECOTRIN   Take 1 Tab by mouth every day.  Dose:  81 mg     atorvastatin 20 MG Tabs  Commonly known as:  LIPITOR   Take 1 Tab by mouth every day.  Dose:  20 mg     budesonide-formoterol 160-4.5 MCG/ACT Aero  Commonly known as:  SYMBICORT   Inhale 2 Puffs by mouth 2 Times a Day.  Dose:  2 Puff     carvedilol 12.5 MG Tabs  Commonly known as:  COREG   Take 1 Tab by mouth 2 times a day, with meals.  Dose:  12.5 mg     colchicine 0.6 MG Tabs  Commonly known as:  COLCRYS   1.2 mg PO x1 then 0.6 mg one hour later     glipiZIDE SR 2.5 MG Tb24  Commonly known as:  GLUCOTROL   Take 2 Tabs by mouth every day.  Dose:  5 mg     nitroglycerin 0.4 MG Subl  Commonly known as:  NITROSTAT   Place 1 Tab under tongue as needed. No more than 3 tablets 5 min apart for chest pain.  Call 911 if no relief.  Dose:  0.4 mg     tamsulosin 0.4 MG capsule  Commonly known as:  FLOMAX   Take 1 Cap by mouth ONE-HALF HOUR AFTER BREAKFAST.  Dose:  0.4 mg     vitamin D 1000 UNIT Tabs  Commonly known as:  cholecalciferol   Take 2 Tabs by mouth every day.  Dose:  2000 Units        STOP taking these medications    MULTI-VITAMIN DAILY PO     potassium citrate SR 10 MEQ (1080 MG) Tbcr  Commonly known as:  UROCIT-K SR     zolpidem 10 MG Tabs  Commonly known as:  AMBIEN            Allergies  Allergies   Allergen Reactions   • Nkda [No Known Drug Allergy]         DIET  Orders Placed This Encounter   Procedures   • Diet Order Diabetic, Consistent Carbohydrate, Cardiac     Standing Status:   Standing     Number of Occurrences:   1     Order Specific Question:   Diet:     Answer:   Diabetic [3]     Order Specific Question:   Diet:     Answer:   Consistent Carbohydrate [4]     Order Specific Question:   Diet:     Answer:   Cardiac [6]     Order Specific Question:   Electrolyte modifications:     Answer:   1.5 g Sodium [1]     Order Specific Question:   Consistency/Fluid modifications:     Answer:   1500 ml Fluid Restriction [9]       ACTIVITY  As tolerated.  Weight bearing as tolerated    CONSULTATIONS  Dr. Bolton -cardiology    PROCEDURES  None    LABORATORY  Lab Results   Component Value Date    SODIUM 136 03/04/2019    POTASSIUM 3.9 03/04/2019    CHLORIDE 102 03/04/2019    CO2 23 03/04/2019    GLUCOSE 149 (H) 03/04/2019    BUN 24 (H) 03/04/2019    CREATININE 1.40 03/04/2019        Lab Results   Component Value Date    WBC 7.4 03/04/2019    HEMOGLOBIN 14.4 03/04/2019    HEMATOCRIT 45.1 03/04/2019    PLATELETCT 193 03/04/2019        Total time of the discharge process exceeds 39 minutes.

## 2019-03-05 VITALS
OXYGEN SATURATION: 94 % | RESPIRATION RATE: 19 BRPM | SYSTOLIC BLOOD PRESSURE: 110 MMHG | DIASTOLIC BLOOD PRESSURE: 61 MMHG | BODY MASS INDEX: 29.27 KG/M2 | WEIGHT: 175.71 LBS | HEART RATE: 85 BPM | HEIGHT: 65 IN | TEMPERATURE: 97.9 F

## 2019-03-05 LAB
ALBUMIN SERPL BCP-MCNC: 3.5 G/DL (ref 3.2–4.9)
BUN SERPL-MCNC: 27 MG/DL (ref 8–22)
CALCIUM SERPL-MCNC: 9.1 MG/DL (ref 8.4–10.2)
CHLORIDE SERPL-SCNC: 99 MMOL/L (ref 96–112)
CO2 SERPL-SCNC: 25 MMOL/L (ref 20–33)
CREAT SERPL-MCNC: 1.4 MG/DL (ref 0.5–1.4)
GLUCOSE BLD-MCNC: 158 MG/DL (ref 65–99)
GLUCOSE BLD-MCNC: 167 MG/DL (ref 65–99)
GLUCOSE BLD-MCNC: 219 MG/DL (ref 65–99)
GLUCOSE BLD-MCNC: 220 MG/DL (ref 65–99)
GLUCOSE SERPL-MCNC: 168 MG/DL (ref 65–99)
MAGNESIUM SERPL-MCNC: 1.9 MG/DL (ref 1.5–2.5)
PHOSPHATE SERPL-MCNC: 3.8 MG/DL (ref 2.5–4.5)
POTASSIUM SERPL-SCNC: 4 MMOL/L (ref 3.6–5.5)
SODIUM SERPL-SCNC: 134 MMOL/L (ref 135–145)

## 2019-03-05 PROCEDURE — 80069 RENAL FUNCTION PANEL: CPT

## 2019-03-05 PROCEDURE — 700102 HCHG RX REV CODE 250 W/ 637 OVERRIDE(OP): Performed by: HOSPITALIST

## 2019-03-05 PROCEDURE — 82962 GLUCOSE BLOOD TEST: CPT

## 2019-03-05 PROCEDURE — 700102 HCHG RX REV CODE 250 W/ 637 OVERRIDE(OP): Performed by: INTERNAL MEDICINE

## 2019-03-05 PROCEDURE — 700111 HCHG RX REV CODE 636 W/ 250 OVERRIDE (IP): Performed by: HOSPITALIST

## 2019-03-05 PROCEDURE — A9270 NON-COVERED ITEM OR SERVICE: HCPCS | Performed by: HOSPITALIST

## 2019-03-05 PROCEDURE — 83735 ASSAY OF MAGNESIUM: CPT

## 2019-03-05 PROCEDURE — A9270 NON-COVERED ITEM OR SERVICE: HCPCS | Performed by: INTERNAL MEDICINE

## 2019-03-05 RX ADMIN — FUROSEMIDE 40 MG: 40 TABLET ORAL at 06:50

## 2019-03-05 RX ADMIN — SPIRONOLACTONE 25 MG: 25 TABLET, FILM COATED ORAL at 06:48

## 2019-03-05 RX ADMIN — ATORVASTATIN CALCIUM 20 MG: 20 TABLET, FILM COATED ORAL at 06:50

## 2019-03-05 RX ADMIN — CARVEDILOL 12.5 MG: 6.25 TABLET, FILM COATED ORAL at 08:50

## 2019-03-05 RX ADMIN — POTASSIUM CHLORIDE 40 MEQ: 1500 TABLET, EXTENDED RELEASE ORAL at 06:51

## 2019-03-05 RX ADMIN — LISINOPRIL 5 MG: 5 TABLET ORAL at 06:49

## 2019-03-05 RX ADMIN — TAMSULOSIN HYDROCHLORIDE 0.4 MG: 0.4 CAPSULE ORAL at 08:50

## 2019-03-05 RX ADMIN — ASPIRIN 81 MG: 81 TABLET, COATED ORAL at 06:48

## 2019-03-05 RX ADMIN — HEPARIN SODIUM 5000 UNITS: 5000 INJECTION, SOLUTION INTRAVENOUS; SUBCUTANEOUS at 06:44

## 2019-03-05 ASSESSMENT — PAIN SCALES - WONG BAKER: WONGBAKER_NUMERICALRESPONSE: DOESN'T HURT AT ALL

## 2019-03-05 NOTE — PROGRESS NOTES
Bedside report given to Ronan WORTHY. POC discussed. Pt up to chair. Safety precautions in place.

## 2019-03-05 NOTE — CARE PLAN
Problem: Safety  Goal: Will remain free from injury  Outcome: MET Date Met: 03/05/19  Helped with ambulation, provide assistance with mobility, educate pt with call alarm.     Problem: Fluid Volume:  Goal: Will maintain balanced intake and output  Outcome: MET Date Met: 03/05/19  Monitor, educate and encourage compliance with therapeutic liquid.

## 2019-03-05 NOTE — PROGRESS NOTES
Discharge orders received, IV and Tele out, report to Shannan Zamora, talked to wife pt getting transferred to SNF. Pt placed on wheelchair with med transportation and CNA. Pt belongings with him.

## 2019-03-05 NOTE — PROGRESS NOTES
Hospital Medicine Daily Progress Note    Date of Service  3/4/2019    Chief Complaint  84 y.o. male admitted 2/27/2019 with SOB, leg swelling    Hospital Course    hx of CAD s/p stent, residual disease, not a candidate for CABG, systolic HF due to ischemic CM w EF of ~30%, HLD, COPD w remote tobacco admitted for SOB and LE swelling      Interval Problem Update  2/28: Tolerating lasix, discussed w cardiology, med management only.  Discussed with wife trudi and palliative care re: goals of care.  Not ready for hospice, they confirm DNR, want home health.  Polst completed  3/1: Edema improved, Discussed w cardiology, change to PO lasix, increase lisin, poss home in AM  3/2: Edema worse than yesterday on lower lasix, no HH available.  Possible SNF, referrals pending.  Increased lasix  3/4: Pending SNF DC, transport arranged for AM    Consultants/Specialty  Palliative Care  Dr. Bolton- Cardiology    Code Status  DNR/DNI    Disposition  Home health (ordered)    Review of Systems  Review of Systems   Constitutional: Negative for chills, fever and malaise/fatigue.   HENT: Positive for hearing loss. Negative for congestion and nosebleeds.    Eyes: Negative for blurred vision, double vision and photophobia.   Respiratory: Negative for cough and shortness of breath.    Cardiovascular: Positive for leg swelling. Negative for chest pain and palpitations.   Gastrointestinal: Negative for abdominal pain, constipation, diarrhea and heartburn.   Genitourinary: Negative for dysuria and frequency.   Musculoskeletal: Negative for back pain and myalgias.   Skin: Positive for rash (Groin).   Neurological: Positive for weakness. Negative for dizziness, sensory change, speech change and focal weakness.   Psychiatric/Behavioral: Positive for memory loss. Negative for depression and hallucinations.   All other systems reviewed and are negative.       Physical Exam  Temp:  [36.2 °C (97.1 °F)-36.7 °C (98.1 °F)] 36.7 °C (98.1 °F)  Pulse:  [57-83]  83  Resp:  [16-18] 16  BP: ()/(51-88) 94/59  SpO2:  [91 %-95 %] 94 %    Physical Exam   Constitutional: He is oriented to person, place, and time. He appears well-developed and well-nourished. No distress.   Awake and alert   HENT:   Head: Normocephalic.   Mouth/Throat: No oropharyngeal exudate.   Hearing loss   Eyes: Pupils are equal, round, and reactive to light. Conjunctivae are normal.   Neck: Normal range of motion. No tracheal deviation present.   Cardiovascular: Normal rate and regular rhythm.    No murmur heard.  Pulmonary/Chest: Effort normal. He has no wheezes. He has no rales. He exhibits no tenderness.   Abdominal: Soft. He exhibits no distension. There is no tenderness. There is no guarding.   Musculoskeletal: Normal range of motion. He exhibits edema (1+). He exhibits no tenderness.   Lymphadenopathy:     He has no cervical adenopathy.   Neurological: He is alert and oriented to person, place, and time. No cranial nerve deficit.   Skin: Skin is warm and dry. Rash (Bright red groin rash) noted. There is erythema. There is pallor.   Psychiatric: He is not slowed. Cognition and memory are impaired. He expresses impulsivity. He does not express inappropriate judgment.   Nursing note and vitals reviewed.      Fluids    Intake/Output Summary (Last 24 hours) at 03/04/19 1726  Last data filed at 03/04/19 1704   Gross per 24 hour   Intake             1360 ml   Output             1175 ml   Net              185 ml       Laboratory  Recent Labs      03/04/19   0429   WBC  7.4   RBC  4.46*   HEMOGLOBIN  14.4   HEMATOCRIT  45.1   MCV  101.1*   MCH  32.3   MCHC  31.9*   RDW  48.1   PLATELETCT  193   MPV  11.2     Recent Labs      03/02/19   0912  03/03/19   0414  03/04/19   0430   SODIUM  136  138  136   POTASSIUM  4.1  4.5  3.9   CHLORIDE  100  105  102   CO2  27  23  23   GLUCOSE  251*  165*  149*   BUN  31*  26*  24*   CREATININE  1.46*  1.49*  1.40   CALCIUM  9.2  9.2  9.4                    Imaging  EC-ECHOCARDIOGRAM COMPLETE W/O CONT   Final Result      OUTSIDE IMAGES-DX CHEST   Final Result           Assessment/Plan  * Acute on chronic combined systolic (congestive) and diastolic (congestive) heart failure (HCC)- (present on admission)   Assessment & Plan    Failed outpatient therapy, despite doubling on his PO lasix at home.   Lasix 40 IV given this morning, change to p.o. twice daily tomorrow  UOP, Daily weights, Low sodium diet, fluid restriction to 1500 cc daily  Echocardiogram w no change: EF 25-%, PAH 50  He is on room air  Spironolactone  Lisinopril 5  Coreg 12.5  Lasix 40 PO BID  Atorva 20  Nitro PRN     Cardiac pacemaker in situ- (present on admission)   Assessment & Plan    Dr. Jarrell     Essential hypertension, benign- (present on admission)   Assessment & Plan    Well-controlled continue  -lisinopril and carvedilol  -Spironolactone 25 daily     Coronary artery disease involving native coronary artery without angina pectoris- (present on admission)   Assessment & Plan    Background history, history of severe stenosis including multiple vessels, has had a history of unsuccessful PCI in the past, and declined coronary bypass in the past as well.  Continue beta-blocker, statin and aspirin  Added spironolactone and ACE this admission  I discussed the case with cardiology, they agree that no intervention is recommended  Discussion with the wife it bedside, she is not ready for hospice but would like home health  POLST completed     Mixed hyperlipidemia- (present on admission)   Assessment & Plan    Atorvastatin     CKD (chronic kidney disease) stage 3, GFR 30-59 ml/min ([facility])- (present on admission)   Assessment & Plan    Renal functions within baseline, his current creatinine 1.54  We will continue to monitor daily BMP for any acute changes,  We will avoid NSAIDs and nephrotoxins     Elevated troponin- (present on admission)   Assessment & Plan    Elevated troponins at outlying facility  0.10  Most likely due to demand ischemia, based on background of significant coronary artery disease that was previously not amendable to PCI, patient family have declined in the past for coronary arterial bypass.  We will maintain aspirin statin and beta-blocker for cardiac protective measures at this time.  Continue to trend serial troponins.  Discussed with cardiology, med management only     COPD (chronic obstructive pulmonary disease) (CMS-HCC)- (present on admission)   Assessment & Plan    Not in acute exacerbation  Resume home inhalers including Symbicort, as needed albuterol  Continue RT protocol, duo nebs, Pep therapy if warranted, and incentive spirometry.        Diabetes type 2, controlled (CMS-HCC)- (present on admission)   Assessment & Plan    Hold home dose of glipizide, last A1c was 7.3  Continue Insulin-sliding scale, accu-checks and hypoglycemia protocol.     Candidal dermatitis   Assessment & Plan    Nystatin powder     Cognitive impairment   Assessment & Plan    Hard of hearing which complicates things but he is tangential and poor insight overall  Wife is making decisions  SNF in AM if accepted     DNR (do not resuscitate)   Assessment & Plan    Confirmed with wife Karma at bedside     BPH (benign prostatic hyperplasia)- (present on admission)   Assessment & Plan    Continue with home dose of Flomax          VTE prophylaxis: Heparin

## 2019-03-05 NOTE — PROGRESS NOTES
Assessment done. AAOx2-3. Pt up on the chair for breakfast. VS taken. Due meds given. Denies pain, n/v Helped pt up in the bed and back to chair. No further needs at this time.

## 2019-03-05 NOTE — DISCHARGE INSTRUCTIONS
Discharge Instructions    Discharged to other by medical transportation with escort. Discharged via wheelchair, hospital escort: Yes.  Special equipment needed: Wheelchair    Be sure to schedule a follow-up appointment with your primary care doctor or any specialists as instructed.     Discharge Plan:   Diet Plan: Discussed  Activity Level: Discussed  Confirmed Follow up Appointment: Patient to Call and Schedule Appointment  Confirmed Symptoms Management: Discussed  Medication Reconciliation Updated: Yes  Influenza Vaccine Indication: Not indicated: Previously immunized this influenza season and > 8 years of age    I understand that a diet low in cholesterol, fat, and sodium is recommended for good health. Unless I have been given specific instructions below for another diet, I accept this instruction as my diet prescription.   Other diet: Cardiac, Diabetic Diet    Special Instructions: None    · Is patient discharged on Warfarin / Coumadin?   No   Shortness of Breath  Shortness of breath means you have trouble breathing. Shortness of breath needs medical care right away.  HOME CARE   · Do not smoke.  · Avoid being around chemicals or things (paint fumes, dust) that may bother your breathing.  · Rest as needed. Slowly begin your normal activities.  · Only take medicines as told by your doctor.  · Keep all doctor visits as told.  GET HELP RIGHT AWAY IF:   · Your shortness of breath gets worse.  · You feel lightheaded, pass out (faint), or have a cough that is not helped by medicine.  · You cough up blood.  · You have pain with breathing.  · You have pain in your chest, arms, shoulders, or belly (abdomen).  · You have a fever.  · You cannot walk up stairs or exercise the way you normally do.  · You do not get better in the time expected.  · You have a hard time doing normal activities even with rest.  · You have problems with your medicines.  · You have any new symptoms.  MAKE SURE YOU:  · Understand these  instructions.  · Will watch your condition.  · Will get help right away if you are not doing well or get worse.  This information is not intended to replace advice given to you by your health care provider. Make sure you discuss any questions you have with your health care provider.  Document Released: 06/05/2009 Document Revised: 12/23/2014 Document Reviewed: 03/04/2013  [employer] Interactive Patient Education © 2017 [employer]    Heart Failure  Heart failure means your heart has trouble pumping blood. This makes it hard for your body to work well. Heart failure is usually a long-term (chronic) condition. You must take good care of yourself and follow your doctor's treatment plan.  HOME CARE  · Take your heart medicine as told by your doctor.  ¨ Do not stop taking medicine unless your doctor tells you to.  ¨ Do not skip any dose of medicine.  ¨ Refill your medicines before they run out.  ¨ Take other medicines only as told by your doctor or pharmacist.  · Stay active if told by your doctor. The elderly and people with severe heart failure should talk with a doctor about physical activity.  · Eat heart-healthy foods. Choose foods that are without trans fat and are low in saturated fat, cholesterol, and salt (sodium). This includes fresh or frozen fruits and vegetables, fish, lean meats, fat-free or low-fat dairy foods, whole grains, and high-fiber foods. Lentils and dried peas and beans (legumes) are also good choices.  · Limit salt if told by your doctor.  · Cook in a healthy way. Roast, grill, broil, bake, poach, steam, or stir-lyons foods.  · Limit fluids as told by your doctor.  · Weigh yourself every morning. Do this after you pee (urinate) and before you eat breakfast. Write down your weight to give to your doctor.  · Take your blood pressure and write it down if your doctor tells you to.  · Ask your doctor how to check your pulse. Check your pulse as told.  · Lose weight if told by your doctor.  · Stop  smoking or chewing tobacco. Do not use gum or patches that help you quit without your doctor's approval.  · Schedule and go to doctor visits as told.  · Nonpregnant women should have no more than 1 drink a day. Men should have no more than 2 drinks a day. Talk to your doctor about drinking alcohol.  · Stop illegal drug use.  · Stay current with shots (immunizations).  · Manage your health conditions as told by your doctor.  · Learn to manage your stress.  · Rest when you are tired.  · If it is really hot outside:  ¨ Avoid intense activities.  ¨ Use air conditioning or fans, or get in a cooler place.  ¨ Avoid caffeine and alcohol.  ¨ Wear loose-fitting, lightweight, and light-colored clothing.  · If it is really cold outside:  ¨ Avoid intense activities.  ¨ Layer your clothing.  ¨ Wear mittens or gloves, a hat, and a scarf when going outside.  ¨ Avoid alcohol.  · Learn about heart failure and get support as needed.  · Get help to maintain or improve your quality of life and your ability to care for yourself as needed.  GET HELP IF:   · You gain weight quickly.  · You are more short of breath than usual.  · You cannot do your normal activities.  · You tire easily.  · You cough more than normal, especially with activity.  · You have any or more puffiness (swelling) in areas such as your hands, feet, ankles, or belly (abdomen).  · You cannot sleep because it is hard to breathe.  · You feel like your heart is beating fast (palpitations).  · You get dizzy or light-headed when you stand up.  GET HELP RIGHT AWAY IF:   · You have trouble breathing.  · There is a change in mental status, such as becoming less alert or not being able to focus.  · You have chest pain or discomfort.  · You faint.  MAKE SURE YOU:   · Understand these instructions.  · Will watch your condition.  · Will get help right away if you are not doing well or get worse.  This information is not intended to replace advice given to you by your health care  provider. Make sure you discuss any questions you have with your health care provider.  Document Released: 09/26/2009 Document Revised: 01/08/2016 Document Reviewed: 02/03/2014  Elsevier Interactive Patient Education © 2017 for; [employer]        Depression / Suicide Risk    As you are discharged from this [facility] facility, it is important to learn how to keep safe from harming yourself.    Recognize the warning signs:  · Abrupt changes in personality, positive or negative- including increase in energy   · Giving away possessions  · Change in eating patterns- significant weight changes-  positive or negative  · Change in sleeping patterns- unable to sleep or sleeping all the time   · Unwillingness or inability to communicate  · Depression  · Unusual sadness, discouragement and loneliness  · Talk of wanting to die  · Neglect of personal appearance   · Rebelliousness- reckless behavior  · Withdrawal from people/activities they love  · Confusion- inability to concentrate     If you or a loved one observes any of these behaviors or has concerns about self-harm, here's what you can do:  · Talk about it- your feelings and reasons for harming yourself  · Remove any means that you might use to hurt yourself (examples: pills, rope, extension cords, firearm)  · Get professional help from the community (Mental Health, Substance Abuse, psychological counseling)  · Do not be alone:Call your Safe Contact- someone whom you trust who will be there for you.  · Call your local CRISIS HOTLINE 761-8626 or 919-414-1436  · Call your local Children's Mobile Crisis Response Team [place] (842) 009-0089 or www.[employer] Together  · Call the toll free National Suicide Prevention Hotlines   · National Suicide Prevention Lifeline 385-811-PDCU (6498)  · National Hope Line Network 800-SUICIDE (832-4496)

## 2019-03-05 NOTE — RESPIRATORY CARE
COPD EDUCATION by COPD CLINICAL EDUCATOR  3/4/2019 at 9:25 AM by Lori Skinner     Patient reviewed by COPD education team. Patient does not qualify for COPD program.

## 2019-03-05 NOTE — PROGRESS NOTES
Report received from Scottie WORTHY. POC discussed. Pt resting comfortably in bed. Safety precaution in place.

## 2019-03-05 NOTE — PROGRESS NOTES
Bedside report given to Emilia WORTHY. Plan of care discussed. Pt resting in bed with safety precautions in place.

## 2019-03-05 NOTE — PROGRESS NOTES
Telemetry Shift Summary    Rhythm SR V-Paced OD  HR Range 60s-80s  Ectopy Occasional PVC, Couplets, Triplets  Measurements 0.14/0.14/0.40        Normal Values  Rhythm SR  HR Range    Measurements 0.12-0.20 / 0.06-0.10  / 0.30-0.52

## 2019-03-05 NOTE — PROGRESS NOTES
Bedside report received from Emilia WORTHY. Plan of care discussed. Pt resting in cardiac chair with safety precautions in place.

## 2019-03-06 NOTE — PROGRESS NOTES
Telemetry Shift Summary    Rhythm paced, BBB  HR Range 80's-90's  Ectopy none  Measurements paced/.14/.40        Normal Values  Rhythm SR  HR Range    Measurements 0.12-0.20 / 0.06-0.10  / 0.30-0.52

## 2019-04-01 ENCOUNTER — TELEPHONE (OUTPATIENT)
Dept: CARDIOLOGY | Facility: MEDICAL CENTER | Age: 84
End: 2019-04-01

## 2019-04-01 ENCOUNTER — OFFICE VISIT (OUTPATIENT)
Dept: CARDIOLOGY | Facility: PHYSICIAN GROUP | Age: 84
End: 2019-04-01
Payer: MEDICARE

## 2019-04-01 VITALS
WEIGHT: 175 LBS | DIASTOLIC BLOOD PRESSURE: 64 MMHG | SYSTOLIC BLOOD PRESSURE: 108 MMHG | BODY MASS INDEX: 29.16 KG/M2 | OXYGEN SATURATION: 93 % | HEART RATE: 82 BPM | HEIGHT: 65 IN

## 2019-04-01 DIAGNOSIS — I10 ESSENTIAL HYPERTENSION, BENIGN: ICD-10-CM

## 2019-04-01 DIAGNOSIS — N18.30 CKD (CHRONIC KIDNEY DISEASE) STAGE 3, GFR 30-59 ML/MIN (HCC): ICD-10-CM

## 2019-04-01 DIAGNOSIS — I25.2 H/O NON-ST ELEVATION MYOCARDIAL INFARCTION (NSTEMI): ICD-10-CM

## 2019-04-01 DIAGNOSIS — I50.43 ACUTE ON CHRONIC COMBINED SYSTOLIC (CONGESTIVE) AND DIASTOLIC (CONGESTIVE) HEART FAILURE (HCC): ICD-10-CM

## 2019-04-01 DIAGNOSIS — Z66 DNR (DO NOT RESUSCITATE): ICD-10-CM

## 2019-04-01 DIAGNOSIS — E78.2 MIXED HYPERLIPIDEMIA: ICD-10-CM

## 2019-04-01 DIAGNOSIS — I25.5 ISCHEMIC CARDIOMYOPATHY: ICD-10-CM

## 2019-04-01 DIAGNOSIS — I51.3 APICAL MURAL THROMBUS: ICD-10-CM

## 2019-04-01 DIAGNOSIS — I25.10 CORONARY ARTERY DISEASE INVOLVING NATIVE CORONARY ARTERY OF NATIVE HEART WITHOUT ANGINA PECTORIS: ICD-10-CM

## 2019-04-01 DIAGNOSIS — Z95.0 CARDIAC PACEMAKER IN SITU: ICD-10-CM

## 2019-04-01 PROCEDURE — 99214 OFFICE O/P EST MOD 30 MIN: CPT | Performed by: INTERNAL MEDICINE

## 2019-04-01 NOTE — PROGRESS NOTES
Chief Complaint   Patient presents with   • Follow-Up       Subjective:   Juliocesar Salas is a 84 y.o. male who presents today in follow-up in regards to his advanced ischemic heart disease with ejection fraction less than 30%, 2014 had an MI severe disease PCI aborted patient and family refused open heart, apical thrombus -RV pacing, it is a CRT device but LV lead was unable to be implanted in 2017    Multiple visits to the hospital, discouraged daughter who is here she is back from [facility] to help    The family is quite tired  Patient has lots and lots of chronic pain shoulder and foot, gout    Past Medical History:   Diagnosis Date   • Apical mural thrombus following MI ([facility])    • Arthritis    • Asthma    • Cardiac pacemaker in situ 3/2/2017    Pulse generator is a SJM model UY9030, serial # [id number], RA lead: SJM model #2088TC/46 , serial #[id number], RV lead: SJM model #2088TC/52 , serial #[id number], Dr. Jarrell    • Coronary artery disease involving native coronary artery without angina pectoris 11/25/2014    Severe stenosis of LAD and ramus intermedius with moderate proximal RCA and CFX disease and severe distal disease, PCI not successful and CABG declined by Dr. Mustafa and family.   • Diabetes 1988    oral agents   • Hypertension    • Ischemic cardiomyopathy 03/2018    Echocardiogram with LVEF 25-30%. Mild AS, mild AI, trace MR. Moderate TR. RVSP 60mmHg.   • Non-STEMI (non-ST elevated myocardial infarction) ([facility])    • Rectus sheath hematoma 12/3/2014     Past Surgical History:   Procedure Laterality Date   • PACEMAKER INSERTION  March 2017    [employer] RF [id number] implanted by Dr. Jarrell. LV lead unable to be placed.   • HIP ARTHROPLASTY TOTAL  3/5/2012    Performed by [facility] ORR at SURGERY [facility] ORS   • COLONOSCOPY  2/2012   • HIP ARTHROPLASTY TOTAL  2007    right   • KNEE ARTHROPLASTY TOTAL  2002    bilateral   • INGUINAL HERNIA REPAIR  1986    right x2   • CATARACT EXTRACTION WITH IOL        Family History   Problem Relation Age of Onset   • Heart Attack Father 65        MI   • Diabetes Unknown    • Heart Disease Unknown    • Hypertension Unknown      Social History     Social History   • Marital status:      Spouse name: N/A   • Number of children: N/A   • Years of education: N/A     Occupational History   • Not on file.     Social History Main Topics   • Smoking status: Former Smoker     Years: 1.00     Types: Cigars     Start date: 1/1/1993     Quit date: 1/1/1994   • Smokeless tobacco: Former User     Types: Chew     Quit date: 1/1/1994   • Alcohol use Yes      Comment: 2 drinks every 2 weeks   • Drug use: No   • Sexual activity: Not on file     Other Topics Concern   • Not on file     Social History Narrative   • No narrative on file     Allergies   Allergen Reactions   • Nkda [No Known Drug Allergy]      Outpatient Encounter Prescriptions as of 4/1/2019   Medication Sig Dispense Refill   • aspirin EC (ECOTRIN) 81 MG Tablet Delayed Response Take 1 Tab by mouth every day. 30 Tab 0   • nitroglycerin (NITROSTAT) 0.4 MG SL Tab Place 1 Tab under tongue as needed. No more than 3 tablets 5 min apart for chest pain.  Call 911 if no relief. 25 Tab 3   • albuterol 108 (90 Base) MCG/ACT Aero Soln inhalation aerosol Inhale 2 Puffs by mouth every four hours as needed for Shortness of Breath. 8.5 g 0   • budesonide-formoterol (SYMBICORT) 160-4.5 MCG/ACT Aerosol Inhale 2 Puffs by mouth 2 Times a Day. 1 Inhaler 0   • glipiZIDE SR (GLUCOTROL) 2.5 MG TABLET SR 24 HR Take 2 Tabs by mouth every day. 30 Tab 2   • atorvastatin (LIPITOR) 20 MG Tab Take 1 Tab by mouth every day. 30 Tab 2   • lisinopril (PRINIVIL) 5 MG Tab Take 1 Tab by mouth every day. 30 Tab 2   • carvedilol (COREG) 12.5 MG Tab Take 1 Tab by mouth 2 times a day, with meals. 60 Tab 3   • nystatin (MYCOSTATIN) powder Apply 1 g to affected area(s) 4 times a day. Apply to groin 15 g 2   • furosemide (LASIX) 40 MG Tab Take 1.5 Tabs by mouth 2 Times  "a Day. 1.5 tabs twice a day 90 Tab 1   • spironolactone (ALDACTONE) 25 MG Tab Take 1 Tab by mouth every day. 30 Tab 3   • colchicine (COLCRYS) 0.6 MG Tab 1.2 mg PO x1 then 0.6 mg one hour later 3 Tab 0   • potassium chloride SA (KDUR) 20 MEQ Tab CR Take 1 Tab by mouth every day. 60 Tab 11   • tamsulosin (FLOMAX) 0.4 MG capsule Take 1 Cap by mouth ONE-HALF HOUR AFTER BREAKFAST. 30 Cap 1   • vitamin D (CHOLECALCIFEROL) 1000 UNIT Tab Take 2 Tabs by mouth every day. 60 Tab 1     No facility-administered encounter medications on file as of 4/1/2019.      Review of Systems   Unable to perform ROS: Dementia        Objective:   /64 (BP Location: Left arm, Patient Position: Sitting, BP Cuff Size: Adult)   Pulse 82   Ht 1.651 m (5' 5\")   Wt 79.4 kg (175 lb)   SpO2 93%   BMI 29.12 kg/m²     Physical Exam   Constitutional:   In wheelchair, basically nonverbal   HENT:   Head: Normocephalic and atraumatic.   Eyes: EOM are normal.   Neck: No JVD present. No thyromegaly present.   Cardiovascular: Normal rate and intact distal pulses.    No murmur heard.  CRT device in left chest   Pulmonary/Chest: Effort normal and breath sounds normal.   Abdominal: Soft.   Musculoskeletal: He exhibits no edema or tenderness.   Neurological: He is alert. He exhibits normal muscle tone. Coordination normal.   Skin: Skin is warm and dry.   Psychiatric: He has a normal mood and affect. His behavior is normal.       Assessment:     1. Mixed hyperlipidemia     2. Ischemic cardiomyopathy     3. Coronary artery disease involving native coronary artery of native heart without angina pectoris     4. Cardiac pacemaker in situ     5. Essential hypertension, benign     6. H/O non-ST elevation myocardial infarction (NSTEMI)     7. Acute on chronic combined systolic (congestive) and diastolic (congestive) heart failure (HCC)     8. Apical mural thrombus     9. DNR (do not resuscitate)         Medical Decision Making:  Today's Assessment / Status / " Plan:     We reviewed his extensive medical chart as well as his hospital visits     Heart failure, end-stage  We spent more than 30 minutes discussing this alone.  I will refer them back to hospice.  They have been afraid to use this for fear that would run out  We talked at length about palliation and comfort and pain control    Medications as is  We talked about his defibrillator although at length, they are not sure if it has a defibrillator function turned on but I will ask they would not like this to be on    No evidence of heart failure on my exam today  No evidence of arrhythmia or active heart issues  We talked about pain and visits to the emergency room    RTC for device check as scheduled, I will see him 4-6 months after that

## 2019-04-01 NOTE — TELEPHONE ENCOUNTER
Serena Lincoln M.D.  Dianna Lopez R.N.             Good morning!     Can you please refer them to hospice, they live in [place].  They expect a call thank you so much      Referral to Hospice initiated

## 2019-06-03 NOTE — PROGRESS NOTES
Bedside report received from Ronn WORTHY. Pt resting in chair, RR even and unlabored. Safety precautions in place, call light within reach. Pt belongings within reach. POC discussed, pain assessed. Pt belongings within reach.   Additional Notes: Treated by Dr. Gely Sotelo Detail Level: Simple Additional Notes: Deemed clinically clear at this time.

## 2021-08-04 NOTE — PROGRESS NOTES
Pt's wife at bedside and assisted with completing admit profile. Assessment completed. See flowsheet. Meds given per MAR. Pt resting in bed with safety precautions in place.   oral

## 2023-06-07 NOTE — CARE PLAN
Problem: Safety  Goal: Will remain free from falls  Outcome: PROGRESSING SLOWER THAN EXPECTED  Pt encouraged to call for assistance as needed. Safety precautions in place. Regular rounding.    Problem: Venous Thromboembolism (VTW)/Deep Vein Thrombosis (DVT) Prevention:  Goal: Patient will participate in Venous Thrombosis (VTE)/Deep Vein Thrombosis (DVT)Prevention Measures  Outcome: PROGRESSING AS EXPECTED  Pt encouraged to change positions throughout shift. Meds given per MAR.        [FreeTextEntry1] : Patient not fasting.  Will go to lab for fasting bloodwork, rx given.\par \par Otitis Media-start Augmentin; saline nasal spray.  Can continue mucinex.\par Encouraged patient to drink plenty of fluids, rest, and take supportive care measures.  Discussed use of saline nasal spray for relief of nasal congestion.  \par Patient advised to call office if symptoms do not improve.  Ms. CHAMBERS expressed understanding.\par \par Anxiety-doing well on current medications.  Will continue dose. \par \par